# Patient Record
Sex: FEMALE | Race: BLACK OR AFRICAN AMERICAN | Employment: UNEMPLOYED | RURAL
[De-identification: names, ages, dates, MRNs, and addresses within clinical notes are randomized per-mention and may not be internally consistent; named-entity substitution may affect disease eponyms.]

---

## 2017-02-17 ENCOUNTER — TELEPHONE (OUTPATIENT)
Dept: FAMILY MEDICINE CLINIC | Age: 52
End: 2017-02-17

## 2017-02-17 DIAGNOSIS — Z12.31 ENCOUNTER FOR SCREENING MAMMOGRAM FOR MALIGNANT NEOPLASM OF BREAST: ICD-10-CM

## 2017-02-17 NOTE — TELEPHONE ENCOUNTER
An appointment has been made for you to have a mammogram at [facility] on May 15, 2017 at 1:00. The phone number in case you need to reschedule is [phone]. Letter to patient and order faxed.

## 2017-02-17 NOTE — LETTER
2/17/2017 2:00 PM 
 
Ms. Neli Braswell 43 [address] Dear Ms. Gonzalez: An appointment has been made for you to have a mammogram at [facility] on May 15, 2017 at 1:00. The phone number in case you need to reschedule is [phone]. If you have any questions, please contact our office. Sincerely, Victor Manuel Dye

## 2017-02-28 ENCOUNTER — OFFICE VISIT (OUTPATIENT)
Dept: FAMILY MEDICINE CLINIC | Age: 52
End: 2017-02-28

## 2017-02-28 VITALS
HEIGHT: 57 IN | TEMPERATURE: 96.3 F | RESPIRATION RATE: 20 BRPM | BODY MASS INDEX: 29.38 KG/M2 | SYSTOLIC BLOOD PRESSURE: 158 MMHG | DIASTOLIC BLOOD PRESSURE: 84 MMHG | WEIGHT: 136.2 LBS | HEART RATE: 64 BPM | OXYGEN SATURATION: 97 %

## 2017-02-28 DIAGNOSIS — I10 ESSENTIAL HYPERTENSION WITH GOAL BLOOD PRESSURE LESS THAN 140/90: Primary | ICD-10-CM

## 2017-02-28 RX ORDER — LISINOPRIL 20 MG/1
40 TABLET ORAL DAILY
Qty: 180 TAB | Refills: 1 | Status: SHIPPED | OUTPATIENT
Start: 2017-02-28 | End: 2017-12-23 | Stop reason: SDUPTHER

## 2017-02-28 RX ORDER — HYDROCHLOROTHIAZIDE 25 MG/1
25 TABLET ORAL DAILY
Qty: 90 TAB | Refills: 1 | Status: SHIPPED | OUTPATIENT
Start: 2017-02-28 | End: 2017-10-05 | Stop reason: SDUPTHER

## 2017-02-28 NOTE — PROGRESS NOTES
Progress Note    Patient: Armaan Gallardo MRN: [id number]  SSN: [id number]    YOB: 1965  Age: 46 y.o. Sex: female        Chief Complaint   Patient presents with    Hypertension     check         Subjective:     Encounter Diagnoses   Name Primary?  Essential hypertension with goal blood pressure less than 140/90 Yes       Hypertension: Uncontrolled   BP Readings from Last 3 Encounters:   02/28/17 158/84   12/23/16 141/82   11/18/16 146/87     The patient reports:  taking medications as instructed, no medication side effects noted, home BP monitoring in range of 145'J systolic over 90'U diastolic, no TIA's, no chest pain on exertion, no dyspnea on exertion, no swelling of ankles. Lab Results   Component Value Date/Time    Sodium 138 11/18/2016 09:42 AM    Potassium 4.2 11/18/2016 09:42 AM    Chloride 100 11/18/2016 09:42 AM    CO2 22 11/18/2016 09:42 AM    Anion gap 9 07/22/2009 03:42 PM    Glucose 79 11/18/2016 09:42 AM    BUN 12 11/18/2016 09:42 AM    Creatinine 0.78 11/18/2016 09:42 AM    BUN/Creatinine ratio 15 11/18/2016 09:42 AM    GFR est  11/18/2016 09:42 AM    GFR est non-AA 87 11/18/2016 09:42 AM    Calcium 9.6 11/18/2016 09:42 AM    Bilirubin, total 0.5 11/18/2015 08:55 AM    AST (SGOT) 16 11/18/2015 08:55 AM    Alk. phosphatase 64 11/18/2015 08:55 AM    Protein, total 7.2 11/18/2015 08:55 AM    Albumin 4.1 11/18/2015 08:55 AM    A-G Ratio 1.3 11/18/2015 08:55 AM    ALT (SGPT) 12 11/18/2015 08:55 AM     Our goal is to normalize the blood pressure to decrease the risks of strokes and heart attacks. The patient is in agreement with the plan. Current and past medical information:    Current Medications after this visit[de-identified]     Current Outpatient Prescriptions   Medication Sig    hydroCHLOROthiazide (HYDRODIURIL) 25 mg tablet Take 1 Tab by mouth daily.  lisinopril (PRINIVIL, ZESTRIL) 20 mg tablet Take 2 Tabs by mouth daily for 90 days.     diphenhydrAMINE (BENADRYL) 12.5 mg/5 mL Take 10 mL by mouth every six (6) hours as needed for Itching or Allergies. Indications: allergic reaction    atenolol (TENORMIN) 100 mg tablet Take 1 Tab by mouth nightly. This prescription is to take the place of all prior refills.  escitalopram oxalate (LEXAPRO) 5 mg tablet Take 1 Tab by mouth daily.  naproxen (NAPROSYN) 500 mg tablet Take 1 Tab by mouth two (2) times daily (with meals). No current facility-administered medications for this visit. Patient Active Problem List    Diagnosis Date Noted    Partial edentulism 12/23/2016    Hyperlipidemia 12/30/2011    GERD (gastroesophageal reflux disease) 12/30/2011    Vitamin D deficiency 01/31/2011    Depression 08/30/2010    Anxiety 08/30/2010    Hypertension        Past Medical History:   Diagnosis Date    Depression 8/30/2010    GERD (gastroesophageal reflux disease) 12/30/2011    Hyperlipidemia 12/30/2011    Hypertension        No Known Allergies    Past Surgical History:   Procedure Laterality Date    CARDIAC SURG PROCEDURE UNLIST  1974    closed hole in heart    HX HERNIA REPAIR  1983    HX TUBAL LIGATION  1995       Social History     Social History    Marital status: SINGLE     Spouse name: N/A    Number of children: N/A    Years of education: N/A     Social History Main Topics    Smoking status: Never Smoker    Smokeless tobacco: None    Alcohol use Yes      Comment: 2 beers every other day    Drug use: No    Sexual activity: Yes     Other Topics Concern    None     Social History Narrative       Review of Systems   Constitutional: Negative for chills and fever. Eyes: Negative for blurred vision and double vision. Respiratory: Negative for sputum production, shortness of breath and wheezing. Cardiovascular: Negative for chest pain, palpitations and leg swelling.         Objective:     Vitals:    02/28/17 1305 02/28/17 1310   BP: 168/81 158/84   Pulse: 65 64   Resp: 20    Temp: 96.3 °F (35.7 °C) TempSrc: Oral    SpO2: 97%    Weight: 136 lb 3.2 oz (61.8 kg)    Height: 4' 9\" (1.448 m)       Body mass index is 29.47 kg/(m^2). Physical Exam   Constitutional: She appears well-developed and well-nourished. Cardiovascular: Normal rate and regular rhythm. Pulmonary/Chest: Effort normal and breath sounds normal.   Musculoskeletal: Normal range of motion. She exhibits no edema. Health Maintenance Due   Topic Date Due    Hepatitis C Screening  1965    DTaP/Tdap/Td series (1 - Tdap) 06/16/1984    PAP AKA CERVICAL CYTOLOGY  01/21/2012    BREAST CANCER SCRN MAMMOGRAM  06/16/2013       Assessment and orders:     Encounter Diagnoses     ICD-10-CM ICD-9-CM   1. Essential hypertension with goal blood pressure less than 140/90 I10 401.9       1. Essential hypertension with goal blood pressure less than 140/90  BP not yet at goal. Will split lisinopril and hctz and increase lisinopril to 40 mg daily. Will have patient recheck BMP and RTC in 3 weeks to recheck BP.   - hydroCHLOROthiazide (HYDRODIURIL) 25 mg tablet; Take 1 Tab by mouth daily. Dispense: 90 Tab; Refill: 1  - lisinopril (PRINIVIL, ZESTRIL) 20 mg tablet; Take 2 Tabs by mouth daily for 90 days. Dispense: 180 Tab; Refill: 1  - METABOLIC PANEL, BASIC; Future        Plan of care:  Discussed diagnoses in detail with patient. Medication risks/benefits/side effects discussed with patient. All of the patient's questions were addressed. The patient understands and agrees with our plan of care. The patient knows to call back if they are unsure of or forget any changes we discussed today or if the symptoms change. The patient received an After-Visit Summary which contains VS, orders, medication list and allergy list. This can be used as a \"mini-medical record\" should they have to seek medical care while out of town. Follow-up Disposition:  Return in about 3 weeks (around 3/21/2017) for For BP check.  Please get blood work done 1 week prior to appointment. .    No future appointments.     Signed By: Jose Luis Odonnell MD     February 28, 2017

## 2017-02-28 NOTE — MR AVS SNAPSHOT
Visit Information Date & Time Provider Department Dept. Phone Encounter #  
 2/28/2017  1:00 PM Kasia Saenz MD [address] [place] [address] [phone] [id number] Follow-up Instructions Return in about 3 weeks (around 3/21/2017) for For BP check. Please get blood work done 1 week prior to appointment. Shen Kelly Upcoming Health Maintenance Date Due Hepatitis C Screening 1965 DTaP/Tdap/Td series (1 - Tdap) 6/16/1984 PAP AKA CERVICAL CYTOLOGY 1/21/2012 BREAST CANCER SCRN MAMMOGRAM 6/16/2013 FOBT Q 1 YEAR AGE 50-75 11/18/2017 Allergies as of 2/28/2017  Review Complete On: 2/28/2017 By: Kasia Saenz MD  
 No Known Allergies Current Immunizations  Reviewed on 8/30/2010 Name Date H1N1 FLU VACCINE 1/14/2010 Influenza Vaccine (Quad) PF 11/18/2016 Not reviewed this visit You Were Diagnosed With   
  
 Codes Comments Essential hypertension with goal blood pressure less than 140/90    -  Primary ICD-10-CM: I10 
ICD-9-CM: 401.9 Vitals BP  
  
  
  
  
  
 158/84 (BP 1 Location: Right arm, BP Patient Position: Sitting) Vitals History BMI and BSA Data Body Mass Index Body Surface Area  
 29.47 kg/m 2 1.58 m 2 Preferred Pharmacy Pharmacy Name Phone [facility] [address] [place] [address] [phone] Your Updated Medication List  
  
   
This list is accurate as of: 2/28/17  1:28 PM.  Always use your most recent med list.  
  
  
  
  
 atenolol 100 mg tablet Commonly known as:  TENORMIN Take 1 Tab by mouth nightly. This prescription is to take the place of all prior refills. diphenhydrAMINE 12.5 mg/5 mL Commonly known as:  BENADRYL Take 10 mL by mouth every six (6) hours as needed for Itching or Allergies. Indications: allergic reaction  
  
 escitalopram oxalate 5 mg tablet Commonly known as:  Domitila Basket Take 1 Tab by mouth daily. hydroCHLOROthiazide 25 mg tablet Commonly known as:  HYDRODIURIL Take 1 Tab by mouth daily. lisinopril 20 mg tablet Commonly known as:  Marla Aye Take 2 Tabs by mouth daily for 90 days. naproxen 500 mg tablet Commonly known as:  NAPROSYN Take 1 Tab by mouth two (2) times daily (with meals). Prescriptions Sent to Pharmacy Refills  
 hydroCHLOROthiazide (HYDRODIURIL) 25 mg tablet 1 Sig: Take 1 Tab by mouth daily. Class: Normal  
 Pharmacy: [address] [place] [address] #: [phone] Route: Oral  
 lisinopril (PRINIVIL, ZESTRIL) 20 mg tablet 1 Sig: Take 2 Tabs by mouth daily for 90 days. Class: Normal  
 Pharmacy: [id number] [place] [address] Ph #: [phone] Route: Oral  
  
Follow-up Instructions Return in about 3 weeks (around 3/21/2017) for For BP check. Please get blood work done 1 week prior to appointment. Joseph Murcia To-Do List   
 03/14/2017 Lab:  METABOLIC PANEL, BASIC Please provide this summary of care documentation to your next provider. Your primary care clinician is listed as Dino North. If you have any questions after today's visit, please call [phone].

## 2017-02-28 NOTE — PROGRESS NOTES
Reviewed record in preparation for visit and have necessary documentation  Pt did not bring medication to office visit for review  Information was given to pt on Advanced Directives, Living Will  opportunity was given for questions  Goals that were addressed and/or need to be completed during or after this appointment include   Health Maintenance Due   Topic Date Due    Hepatitis C Screening  1965    DTaP/Tdap/Td series (1 - Tdap) 06/16/1984    PAP AKA CERVICAL CYTOLOGY  01/21/2012    BREAST CANCER SCRN MAMMOGRAM  06/16/2013

## 2017-03-14 DIAGNOSIS — I10 ESSENTIAL HYPERTENSION WITH GOAL BLOOD PRESSURE LESS THAN 140/90: ICD-10-CM

## 2017-03-23 ENCOUNTER — OFFICE VISIT (OUTPATIENT)
Dept: FAMILY MEDICINE CLINIC | Age: 52
End: 2017-03-23

## 2017-03-23 VITALS
OXYGEN SATURATION: 97 % | BODY MASS INDEX: 29.34 KG/M2 | RESPIRATION RATE: 18 BRPM | SYSTOLIC BLOOD PRESSURE: 155 MMHG | TEMPERATURE: 95.9 F | WEIGHT: 136 LBS | DIASTOLIC BLOOD PRESSURE: 88 MMHG | HEIGHT: 57 IN | HEART RATE: 59 BPM

## 2017-03-23 DIAGNOSIS — F33.0 MILD EPISODE OF RECURRENT MAJOR DEPRESSIVE DISORDER (HCC): ICD-10-CM

## 2017-03-23 DIAGNOSIS — I10 ESSENTIAL HYPERTENSION: Primary | ICD-10-CM

## 2017-03-23 RX ORDER — CHOLECALCIFEROL (VITAMIN D3) 125 MCG
CAPSULE ORAL
COMMUNITY
End: 2020-02-27 | Stop reason: SDUPTHER

## 2017-03-23 RX ORDER — CITALOPRAM 10 MG/1
10 TABLET ORAL DAILY
Qty: 30 TAB | Refills: 4 | Status: SHIPPED | OUTPATIENT
Start: 2017-03-23 | End: 2018-04-24 | Stop reason: SDUPTHER

## 2017-03-23 NOTE — PATIENT INSTRUCTIONS
Herrera Frazier with [address]  [address] [place] [address],  [address] [place], [address] [place] [address]  [phone]    Monitor blood pressure outside the office several times weekly at different times during the day and evening. Bring the record to me in 3 weeks for review.     Blood Pressure Record     Patient Name:  Willy Sewell :  1965    Date/Time BP Reading Pulse

## 2017-03-23 NOTE — PROGRESS NOTES
I reviewed the findings, assessment and plan in detail with the resident and agree with the resident's findings and plan as documented in the resident's note. Elly Goyal M.D.

## 2017-03-23 NOTE — PROGRESS NOTES
Reviewed record in preparation for visit and have necessary documentation  Pt did not bring medication to office visit for review  opportunity was given for questions  Goals that were addressed and/or need to be completed during or after this appointment include   Health Maintenance Due   Topic Date Due    Hepatitis C Screening  1965    DTaP/Tdap/Td series (1 - Tdap) 06/16/1984    PAP AKA CERVICAL CYTOLOGY  01/21/2012    BREAST CANCER SCRN MAMMOGRAM  06/16/2013

## 2017-03-23 NOTE — PROGRESS NOTES
Progress Note    Patient: Diamond Levy MRN: [id number]  SSN: [id number]    YOB: 1965  Age: 46 y.o. Sex: female        Chief Complaint   Patient presents with    Hypertension         Subjective:     Encounter Diagnoses   Name Primary?  Essential hypertension Yes    Mild episode of recurrent major depressive disorder ([facility] [address]        Hypertension: Uncontrolled    BP Readings from Last 3 Encounters:   03/23/17 155/88   02/28/17 158/84   12/23/16 141/82     The patient reports:  taking medications as instructed (Patient did not take medication this morning bc she did not eat), no medication side effects noted, no TIA's, no chest pain on exertion, no dyspnea on exertion, no swelling of ankles. Lab Results   Component Value Date/Time    Sodium 138 11/18/2016 09:42 AM    Potassium 4.2 11/18/2016 09:42 AM    Chloride 100 11/18/2016 09:42 AM    CO2 22 11/18/2016 09:42 AM    Anion gap 9 07/22/2009 03:42 PM    Glucose 79 11/18/2016 09:42 AM    BUN 12 11/18/2016 09:42 AM    Creatinine 0.78 11/18/2016 09:42 AM    BUN/Creatinine ratio 15 11/18/2016 09:42 AM    GFR est  11/18/2016 09:42 AM    GFR est non-AA 87 11/18/2016 09:42 AM    Calcium 9.6 11/18/2016 09:42 AM    Bilirubin, total 0.5 11/18/2015 08:55 AM    AST (SGOT) 16 11/18/2015 08:55 AM    Alk. phosphatase 64 11/18/2015 08:55 AM    Protein, total 7.2 11/18/2015 08:55 AM    Albumin 4.1 11/18/2015 08:55 AM    A-G Ratio 1.3 11/18/2015 08:55 AM    ALT (SGPT) 12 11/18/2015 08:55 AM     Our goal is to normalize the blood pressure to decrease the risks of strokes and heart attacks. The patient is in agreement with the plan. Depression: Controlled on current medications. The patient has no suicidal ideation, psychotic features or addictions.       Current and past medical information:    Current Medications after this visit[de-identified]     Current Outpatient Prescriptions   Medication Sig    cholecalciferol, vitamin D3, (VITAMIN D3) 2,000 unit tab Take  by mouth.    citalopram (CELEXA) 10 mg tablet Take 1 Tab by mouth daily.  hydroCHLOROthiazide (HYDRODIURIL) 25 mg tablet Take 1 Tab by mouth daily.  lisinopril (PRINIVIL, ZESTRIL) 20 mg tablet Take 2 Tabs by mouth daily for 90 days.  naproxen (NAPROSYN) 500 mg tablet Take 1 Tab by mouth two (2) times daily (with meals).  diphenhydrAMINE (BENADRYL) 12.5 mg/5 mL Take 10 mL by mouth every six (6) hours as needed for Itching or Allergies. Indications: allergic reaction    atenolol (TENORMIN) 100 mg tablet Take 1 Tab by mouth nightly. This prescription is to take the place of all prior refills. No current facility-administered medications for this visit. Patient Active Problem List    Diagnosis Date Noted    Partial edentulism 12/23/2016    Hyperlipidemia 12/30/2011    GERD (gastroesophageal reflux disease) 12/30/2011    Vitamin D deficiency 01/31/2011    Depression 08/30/2010    Anxiety 08/30/2010    Hypertension        Past Medical History:   Diagnosis Date    Depression 8/30/2010    GERD (gastroesophageal reflux disease) 12/30/2011    Hyperlipidemia 12/30/2011    Hypertension        No Known Allergies    Past Surgical History:   Procedure Laterality Date    CARDIAC SURG PROCEDURE UNLIST  1974    closed hole in heart    HX HERNIA REPAIR  1983    HX TUBAL LIGATION  1995       Social History     Social History    Marital status: SINGLE     Spouse name: N/A    Number of children: N/A    Years of education: N/A     Social History Main Topics    Smoking status: Never Smoker    Smokeless tobacco: None    Alcohol use Yes      Comment: 2 beers every other day    Drug use: No    Sexual activity: Yes     Other Topics Concern    None     Social History Narrative       {Review of Systems   Cardiovascular: Negative for chest pain, palpitations and leg swelling. Skin: Negative for itching and rash. Neurological: Negative for dizziness, seizures and loss of consciousness. Psychiatric/Behavioral: Positive for depression. Negative for hallucinations, substance abuse and suicidal ideas. The patient is not nervous/anxious and does not have insomnia. Objective:     Vitals:    03/23/17 1026   BP: 155/88   Pulse: (!) 59   Resp: 18   Temp: 95.9 °F (35.5 °C)   TempSrc: Oral   SpO2: 97%   Weight: 136 lb (61.7 kg)   Height: 4' 9\" (1.448 m)      Body mass index is 29.43 kg/(m^2). Physical Exam   Constitutional: She is oriented to person, place, and time. She appears well-developed and well-nourished. HENT:   Head: Normocephalic and atraumatic. Cardiovascular: Normal rate and regular rhythm. Pulmonary/Chest: Effort normal and breath sounds normal.   Musculoskeletal: Normal range of motion. She exhibits no edema. Neurological: She is alert and oriented to person, place, and time. Health Maintenance Due   Topic Date Due    Hepatitis C Screening  1965    DTaP/Tdap/Td series (1 - Tdap) 06/16/1984    PAP AKA CERVICAL CYTOLOGY  01/21/2012    BREAST CANCER SCRN MAMMOGRAM  06/16/2013       Assessment and orders:     Encounter Diagnoses     ICD-10-CM ICD-9-CM   1. Essential hypertension I10 401.9   2. Mild episode of recurrent major depressive disorder (HCC) F33.0 296.31       1. Essential hypertension  Advised patient to take medication. Will get blood work today. Advised to keep BP log and bring all medication and BP machine to office. 2. Mild episode of recurrent major depressive disorder [facility])  Patient requested change due to cost of medication. Had been on low dose lexapro 5 mg.   - citalopram (CELEXA) 10 mg tablet; Take 1 Tab by mouth daily. Dispense: 30 Tab; Refill: 4        Plan of care:  Discussed diagnoses in detail with patient. Medication risks/benefits/side effects discussed with patient. All of the patient's questions were addressed. The patient understands and agrees with our plan of care.     The patient knows to call back if they are unsure of or forget any changes we discussed today or if the symptoms change. The patient received an After-Visit Summary which contains VS, orders, medication list and allergy list. This can be used as a \"mini-medical record\" should they have to seek medical care while out of town. Follow-up Disposition:  Return in about 4 weeks (around 4/20/2017). No future appointments.     Signed By: Evan May MD     March 23, 2017

## 2017-03-23 NOTE — MR AVS SNAPSHOT
Visit Information Date & Time Provider Department Dept. Phone Encounter #  
 3/23/2017 10:30 AM Ciara Beltran MD [address] [place] [address] [phone] [id number] Follow-up Instructions Return in about 4 weeks (around 4/20/2017) for Keep BP log and bring all medication and BP machine to office. .  
 Follow-up and Disposition History Upcoming Health Maintenance Date Due Hepatitis C Screening 1965 DTaP/Tdap/Td series (1 - Tdap) 6/16/1984 PAP AKA CERVICAL CYTOLOGY 1/21/2012 BREAST CANCER SCRN MAMMOGRAM 6/16/2013 FOBT Q 1 YEAR AGE 50-75 11/18/2017 Allergies as of 3/23/2017  Review Complete On: 3/23/2017 By: Ciara Beltran MD  
 No Known Allergies Current Immunizations  Reviewed on 8/30/2010 Name Date H1N1 FLU VACCINE 1/14/2010 Influenza Vaccine (Quad) PF 11/18/2016 Not reviewed this visit You Were Diagnosed With   
  
 Codes Comments Essential hypertension    -  Primary ICD-10-CM: I10 
ICD-9-CM: 401.9 Mild episode of recurrent major depressive disorder (HCC)     ICD-10-CM: F33.0 ICD-9-CM: 296.31 Vitals BP Pulse Temp Resp Height(growth percentile) Weight(growth percentile) 155/88 (BP 1 Location: Left arm, BP Patient Position: Sitting) (!) 59 95.9 °F (35.5 °C) (Oral) 18 4' 9\" (1.448 m) 136 lb (61.7 kg) LMP SpO2 BMI OB Status Smoking Status 01/02/2017 (Within Months) 97% 29.43 kg/m2 Having regular periods Never Smoker Vitals History BMI and BSA Data Body Mass Index Body Surface Area  
 29.43 kg/m 2 1.58 m 2 Preferred Pharmacy Pharmacy Name Phone [facility] [address] [place] [address] [phone] Your Updated Medication List  
  
   
This list is accurate as of: 3/23/17 11:05 AM.  Always use your most recent med list.  
  
  
  
  
 atenolol 100 mg tablet Commonly known as:  TENORMIN  
 Take 1 Tab by mouth nightly. This prescription is to take the place of all prior refills. citalopram 10 mg tablet Commonly known as:  Algernon Kotyk Take 1 Tab by mouth daily. diphenhydrAMINE 12.5 mg/5 mL Commonly known as:  BENADRYL Take 10 mL by mouth every six (6) hours as needed for Itching or Allergies. Indications: allergic reaction  
  
 hydroCHLOROthiazide 25 mg tablet Commonly known as:  HYDRODIURIL Take 1 Tab by mouth daily. lisinopril 20 mg tablet Commonly known as:  Isidro Len Take 2 Tabs by mouth daily for 90 days. naproxen 500 mg tablet Commonly known as:  NAPROSYN Take 1 Tab by mouth two (2) times daily (with meals). VITAMIN D3 2,000 unit Tab Generic drug:  cholecalciferol (vitamin D3) Take  by mouth. Prescriptions Sent to Pharmacy Refills  
 citalopram (CELEXA) 10 mg tablet 4 Sig: Take 1 Tab by mouth daily. Class: Normal  
 Pharmacy: [address] #: [phone] Route: Oral  
  
Follow-up Instructions Return in about 4 weeks (around 2017) for Keep BP log and bring all medication and BP machine to office. .  
  
  
Patient Instructions Megan William [facility] with [address] [place] [address] 
[phone] Monitor blood pressure outside the office several times weekly at different times during the day and evening. Bring the record to me in 3 weeks for review. Blood Pressure Record Patient Name:  Diamond Levy :  1965 Date/Time BP Reading Pulse Patient Instructions History Please provide this summary of care documentation to your next provider. Your primary care clinician is listed as Orlando Wood. If you have any questions after today's visit, please call [phone].

## 2017-03-24 LAB
BUN SERPL-MCNC: 13 MG/DL (ref 6–24)
BUN/CREAT SERPL: 16 (ref 9–23)
CALCIUM SERPL-MCNC: 9.7 MG/DL (ref 8.7–10.2)
CHLORIDE SERPL-SCNC: 95 MMOL/L (ref 96–106)
CO2 SERPL-SCNC: 23 MMOL/L (ref 18–29)
CREAT SERPL-MCNC: 0.8 MG/DL (ref 0.57–1)
GLUCOSE SERPL-MCNC: 89 MG/DL (ref 65–99)
POTASSIUM SERPL-SCNC: 3.9 MMOL/L (ref 3.5–5.2)
SODIUM SERPL-SCNC: 135 MMOL/L (ref 134–144)

## 2017-04-27 ENCOUNTER — OFFICE VISIT (OUTPATIENT)
Dept: FAMILY MEDICINE CLINIC | Age: 52
End: 2017-04-27

## 2017-04-27 VITALS
DIASTOLIC BLOOD PRESSURE: 65 MMHG | WEIGHT: 138 LBS | OXYGEN SATURATION: 98 % | HEIGHT: 57 IN | HEART RATE: 68 BPM | RESPIRATION RATE: 16 BRPM | TEMPERATURE: 98 F | SYSTOLIC BLOOD PRESSURE: 117 MMHG | BODY MASS INDEX: 29.77 KG/M2

## 2017-04-27 DIAGNOSIS — R68.89 WORSENING FUNCTIONAL ENDURANCE: ICD-10-CM

## 2017-04-27 DIAGNOSIS — I10 ESSENTIAL HYPERTENSION: Primary | ICD-10-CM

## 2017-04-27 DIAGNOSIS — F33.0 MILD EPISODE OF RECURRENT MAJOR DEPRESSIVE DISORDER (HCC): ICD-10-CM

## 2017-04-27 NOTE — PROGRESS NOTES
I discussed the findings, assessment and plan in detail with the resident and agree with the resident's findings and plan as documented in the resident's note. Kimberly Almonte M.D.

## 2017-04-27 NOTE — MR AVS SNAPSHOT
Visit Information Date & Time Provider Department Dept. Phone Encounter #  
 4/27/2017 10:40 AM Radha Jimenes MD [facility]ru[facility] Marlow [id number] Follow-up Instructions Return if symptoms worsen or fail to improve, for [facility]. Upcoming Health Maintenance Date Due Hepatitis C Screening 1965 DTaP/Tdap/Td series (1 - Tdap) 6/16/1984 PAP AKA CERVICAL CYTOLOGY 1/21/2012 BREAST CANCER SCRN MAMMOGRAM 6/16/2013 FOBT Q 1 YEAR AGE 50-75 11/18/2017 Allergies as of 4/27/2017  Review Complete On: 4/27/2017 By: Radha Jimenes MD  
 No Known Allergies Current Immunizations  Reviewed on 8/30/2010 Name Date H1N1 FLU VACCINE 1/14/2010 Influenza Vaccine (Quad) PF 11/18/2016 Not reviewed this visit You Were Diagnosed With   
  
 Codes Comments Essential hypertension    -  Primary ICD-10-CM: I10 
ICD-9-CM: 401.9 Mild episode of recurrent major depressive disorder (HCC)     ICD-10-CM: F33.0 ICD-9-CM: 296.31 Worsening functional endurance     ICD-10-CM: R68.89 ICD-9-CM: 780.99 Vitals BP Pulse Temp Resp Height(growth percentile) Weight(growth percentile)  
 117/65 (BP 1 Location: Right arm, BP Patient Position: Sitting) 68 98 °F (36.7 °C) (Oral) 16 4' 9\" (1.448 m) 138 lb (62.6 kg) LMP SpO2 BMI OB Status Smoking Status 04/12/2017 98% 29.86 kg/m2 Premenopausal Never Smoker Vitals History BMI and BSA Data Body Mass Index Body Surface Area  
 29.86 kg/m 2 1.59 m 2 Preferred Pharmacy Pharmacy Name Phone [facility] [address] [facility] [address] [phone] Your Updated Medication List  
  
   
This list is accurate as of: 4/27/17 11:14 AM.  Always use your most recent med list.  
  
  
  
  
 atenolol 100 mg tablet Commonly known as:  TENORMIN Take 1 Tab by mouth nightly.  This prescription is to take the place of all prior refills. citalopram 10 mg tablet Commonly known as:  Berneta Face Take 1 Tab by mouth daily. diphenhydrAMINE 12.5 mg/5 mL Commonly known as:  BENADRYL Take 10 mL by mouth every six (6) hours as needed for Itching or Allergies. Indications: allergic reaction  
  
 hydroCHLOROthiazide 25 mg tablet Commonly known as:  HYDRODIURIL Take 1 Tab by mouth daily. lisinopril 20 mg tablet Commonly known as:  Shirlie Holes Take 2 Tabs by mouth daily for 90 days. naproxen 500 mg tablet Commonly known as:  NAPROSYN Take 1 Tab by mouth two (2) times daily (with meals). VITAMIN D3 2,000 unit Tab Generic drug:  cholecalciferol (vitamin D3) Take  by mouth. Follow-up Instructions Return if symptoms worsen or fail to improve, for Well Woman. Please provide this summary of care documentation to your next provider. Your primary care clinician is listed as Clenton Saint. If you have any questions after today's visit, please call [phone].

## 2017-04-27 NOTE — PROGRESS NOTES
Progress Note    Patient: Rodríguez Mendoza MRN: [id number]  SSN: [id number]    YOB: 1965  Age: 46 y.o. Sex: female        Chief Complaint   Patient presents with    Hypertension    Extremity Weakness     bilateral leg weakness          Subjective:     Encounter Diagnoses   Name Primary?  Essential hypertension Yes    Mild episode of recurrent major depressive disorder ([facility] [address]     Worsening functional endurance        Hypertension: Controlled   BP Readings from Last 3 Encounters:   04/27/17 117/65   03/23/17 155/88   02/28/17 158/84     The patient reports:  taking medications as instructed, no medication side effects noted, no TIA's, no chest pain on exertion, no dyspnea on exertion, no swelling of ankles, patient brought log and BP machine to office for check. Home BP log. -120s   Lab Results   Component Value Date/Time    Sodium 135 03/23/2017 11:17 AM    Potassium 3.9 03/23/2017 11:17 AM    Chloride 95 03/23/2017 11:17 AM    CO2 23 03/23/2017 11:17 AM    Anion gap 9 07/22/2009 03:42 PM    Glucose 89 03/23/2017 11:17 AM    BUN 13 03/23/2017 11:17 AM    Creatinine 0.80 03/23/2017 11:17 AM    BUN/Creatinine ratio 16 03/23/2017 11:17 AM    GFR est AA 97 03/23/2017 11:17 AM    GFR est non-AA 84 03/23/2017 11:17 AM    Calcium 9.7 03/23/2017 11:17 AM    Bilirubin, total 0.5 11/18/2015 08:55 AM    AST (SGOT) 16 11/18/2015 08:55 AM    Alk. phosphatase 64 11/18/2015 08:55 AM    Protein, total 7.2 11/18/2015 08:55 AM    Albumin 4.1 11/18/2015 08:55 AM    A-G Ratio 1.3 11/18/2015 08:55 AM    ALT (SGPT) 12 11/18/2015 08:55 AM     Our goal is to normalize the blood pressure to decrease the risks of strokes and heart attacks. The patient is in agreement with the plan. Depression: Stable on current medications. Patient has switched to Celexa due to cost of medication. The patient has no suicidal ideation, psychotic features or addictions.     LE weakness  Patient reports LE weakness that after walking for several blocks that she feels her legs are heavy. No pain, change in sensation. Patient reports that she had been unable to do her regular walks over the winter due to the cold weather. Current and past medical information:    Current Medications after this visit[de-identified]     Current Outpatient Prescriptions   Medication Sig    cholecalciferol, vitamin D3, (VITAMIN D3) 2,000 unit tab Take  by mouth.  citalopram (CELEXA) 10 mg tablet Take 1 Tab by mouth daily.  hydroCHLOROthiazide (HYDRODIURIL) 25 mg tablet Take 1 Tab by mouth daily.  lisinopril (PRINIVIL, ZESTRIL) 20 mg tablet Take 2 Tabs by mouth daily for 90 days.  naproxen (NAPROSYN) 500 mg tablet Take 1 Tab by mouth two (2) times daily (with meals).  diphenhydrAMINE (BENADRYL) 12.5 mg/5 mL Take 10 mL by mouth every six (6) hours as needed for Itching or Allergies. Indications: allergic reaction    atenolol (TENORMIN) 100 mg tablet Take 1 Tab by mouth nightly. This prescription is to take the place of all prior refills. No current facility-administered medications for this visit.         Patient Active Problem List    Diagnosis Date Noted    Partial edentulism 12/23/2016    Hyperlipidemia 12/30/2011    GERD (gastroesophageal reflux disease) 12/30/2011    Vitamin D deficiency 01/31/2011    Depression 08/30/2010    Anxiety 08/30/2010    Hypertension        Past Medical History:   Diagnosis Date    Depression 8/30/2010    GERD (gastroesophageal reflux disease) 12/30/2011    Hyperlipidemia 12/30/2011    Hypertension        No Known Allergies    Past Surgical History:   Procedure Laterality Date    CARDIAC SURG PROCEDURE UNLIST  1974    closed hole in heart    HX HERNIA REPAIR  1983    HX TUBAL LIGATION  1995       Social History     Social History    Marital status: SINGLE     Spouse name: N/A    Number of children: N/A    Years of education: N/A     Social History Main Topics    Smoking status: Never Smoker    Smokeless tobacco: None    Alcohol use Yes      Comment: 2 beers every other day    Drug use: No    Sexual activity: Yes     Other Topics Concern    None     Social History Narrative       {Review of Systems   Constitutional: Negative for chills and fever. Cardiovascular: Negative for chest pain, palpitations and leg swelling. Neurological: Negative for dizziness, tingling, sensory change and focal weakness. Objective:     Vitals:    04/27/17 1039   BP: 117/65   Pulse: 68   Resp: 16   Temp: 98 °F (36.7 °C)   TempSrc: Oral   SpO2: 98%   Weight: 138 lb (62.6 kg)   Height: 4' 9\" (1.448 m)      Body mass index is 29.86 kg/(m^2). Physical Exam   Constitutional: She is oriented to person, place, and time. She appears well-developed and well-nourished. Cardiovascular: Normal rate, regular rhythm and intact distal pulses. Musculoskeletal:        Right ankle: She exhibits normal pulse. Left ankle: She exhibits normal pulse. Neurological: She is alert and oriented to person, place, and time. She has normal reflexes. Skin: Skin is warm and dry.     MSK:    Posture: Normal   Deformity: None    ROM:     Flexion: Normal    Extension: Normal     Lateral bending: Normal      Gait: Normal       Palpation:    L1-L5: No tenderness    Sacrum: No tenderness    Coccyx: No tenderness    Left Paraspinal: No tenderness    Right Paraspinal: No tenderness     Strength (0-5/5)    Hip Flexion:   Left: 5/5  Right: 5/5    Hip Extension:  Left: 5/5  Right: 5/5    Hip Abduction:  Left: 5/5  Right: 5/5    Hip Adduction:  Left: 5/5  Right: 5/5    Knee Extension:  Left: 5/5  Right: 5/5    Knee Flexion:   Left: 5/5  Right: 5/5    Ankle dorsiflexion:  Left: 5/5  Right: 5/5    Ankle plantarflexion:  Left: 5/5  Right: 5/5    Great toe extension:  Left: 5/5  Right: 5/5     Sensation: Intact, no deficits      DTR:    Patella:  Left: +2  Right: +2    Achilles:  Left: +2  Right: +2     Special test:    Straight leg: Left: Negative  Right: Negative    Roulas: Left: Negative  Right: Negative              Health Maintenance Due   Topic Date Due    Hepatitis C Screening  1965    DTaP/Tdap/Td series (1 - Tdap) 06/16/1984    PAP AKA CERVICAL CYTOLOGY  01/21/2012    BREAST CANCER SCRN MAMMOGRAM  06/16/2013       Assessment and orders:     Encounter Diagnoses     ICD-10-CM ICD-9-CM   1. Essential hypertension I10 401.9   2. Mild episode of recurrent major depressive disorder (HCC) F33.0 296.31   3. Worsening functional endurance R68.89 780.99       1. Essential hypertension  Continue current medication. Home BP at goal. No hypotensive symptoms. 2. Mild episode of recurrent major depressive disorder ([address]  Continue current medication. 3. Worsening functional endurance  Advised patient to slowly scale up activities. Plan of care:  Discussed diagnoses in detail with patient. Medication risks/benefits/side effects discussed with patient. All of the patient's questions were addressed. The patient understands and agrees with our plan of care. The patient knows to call back if they are unsure of or forget any changes we discussed today or if the symptoms change. The patient received an After-Visit Summary which contains VS, orders, medication list and allergy list. This can be used as a \"mini-medical record\" should they have to seek medical care while out of town. Follow-up Disposition:  Return if symptoms worsen or fail to improve, for [facility]. No future appointments.     Signed By: Angi Zhang MD     April 27, 2017

## 2017-08-21 ENCOUNTER — TELEPHONE (OUTPATIENT)
Dept: FAMILY MEDICINE CLINIC | Age: 52
End: 2017-08-21

## 2017-08-21 DIAGNOSIS — I10 ESSENTIAL HYPERTENSION: Primary | ICD-10-CM

## 2017-08-21 NOTE — TELEPHONE ENCOUNTER
Received notification that Atenolol is unavailable. Please advise an alternative medication at [facility] - [phone].

## 2017-08-22 RX ORDER — METOPROLOL TARTRATE 50 MG/1
50 TABLET ORAL 2 TIMES DAILY
Qty: 60 TAB | Refills: 1 | Status: SHIPPED | OUTPATIENT
Start: 2017-08-22 | End: 2017-10-05 | Stop reason: SDUPTHER

## 2017-10-05 ENCOUNTER — OFFICE VISIT (OUTPATIENT)
Dept: FAMILY MEDICINE CLINIC | Age: 52
End: 2017-10-05

## 2017-10-05 VITALS
SYSTOLIC BLOOD PRESSURE: 119 MMHG | HEART RATE: 87 BPM | OXYGEN SATURATION: 95 % | RESPIRATION RATE: 18 BRPM | HEIGHT: 57 IN | BODY MASS INDEX: 29.68 KG/M2 | DIASTOLIC BLOOD PRESSURE: 78 MMHG | WEIGHT: 137.6 LBS | TEMPERATURE: 98.5 F

## 2017-10-05 DIAGNOSIS — Z23 ENCOUNTER FOR IMMUNIZATION: ICD-10-CM

## 2017-10-05 DIAGNOSIS — E78.2 MIXED HYPERLIPIDEMIA: ICD-10-CM

## 2017-10-05 DIAGNOSIS — I10 ESSENTIAL HYPERTENSION WITH GOAL BLOOD PRESSURE LESS THAN 140/90: Primary | ICD-10-CM

## 2017-10-05 DIAGNOSIS — F33.42 RECURRENT MAJOR DEPRESSIVE DISORDER, IN FULL REMISSION (HCC): ICD-10-CM

## 2017-10-05 DIAGNOSIS — Z11.59 NEED FOR HEPATITIS C SCREENING TEST: ICD-10-CM

## 2017-10-05 RX ORDER — HYDROCHLOROTHIAZIDE 25 MG/1
25 TABLET ORAL DAILY
Qty: 90 TAB | Refills: 1 | Status: SHIPPED | OUTPATIENT
Start: 2017-10-05 | End: 2018-06-18 | Stop reason: SDUPTHER

## 2017-10-05 RX ORDER — METOPROLOL TARTRATE 50 MG/1
50 TABLET ORAL 2 TIMES DAILY
Qty: 180 TAB | Refills: 1 | Status: SHIPPED | OUTPATIENT
Start: 2017-10-05 | End: 2018-08-11 | Stop reason: SDUPTHER

## 2017-10-05 NOTE — PROGRESS NOTES
Chief Complaint   Patient presents with    Blood Pressure Check    Immunization/Injection     Flu Shot     Body mass index is 29.78 kg/(m^2).     Reviewed record in preparation for visit and have necessary documentation  Pt did not bring medication to office visit for review  Information was given to pt on Advanced Directives, Living Will  Information was given on Shingles Vaccine  Opportunity was given for questions  Goals that were addressed and/or need to be completed after this appointment include:     Health Maintenance Due   Topic Date Due    Hepatitis C Screening  1965    DTaP/Tdap/Td series (1 - Tdap) 06/16/1984    PAP AKA CERVICAL CYTOLOGY  01/21/2012    BREAST CANCER SCRN MAMMOGRAM  06/16/2013    INFLUENZA AGE 9 TO ADULT  08/01/2017    FOBT Q 1 YEAR AGE 50-75  11/18/2017

## 2017-10-05 NOTE — PATIENT INSTRUCTIONS
Influenza (Flu) Vaccine (Inactivated or Recombinant): What You Need to Know  Why get vaccinated? Influenza (\"flu\") is a contagious disease that spreads around the [place] every winter, usually between October and May. Flu is caused by influenza viruses and is spread mainly by coughing, sneezing, and close contact. Anyone can get flu. Flu strikes suddenly and can last several days. Symptoms vary by age, but can include:  · Fever/chills. · Sore throat. · Muscle aches. · Fatigue. · Cough. · Headache. · Runny or stuffy nose. Flu can also lead to pneumonia and blood infections, and cause diarrhea and seizures in children. If you have a medical condition, such as heart or lung disease, flu can make it worse. Flu is more dangerous for some people. Infants and young children, people 72years of age and older, pregnant women, and people with certain health conditions or a weakened immune system are at greatest risk. Each year thousands of people in the [facility] die from flu, and many more are hospitalized. Flu vaccine can:  · Keep you from getting flu. · Make flu less severe if you do get it. · Keep you from spreading flu to your family and other people. Inactivated and recombinant flu vaccines  A dose of flu vaccine is recommended every flu season. Children 6 months through 6years of age may need two doses during the same flu season. Everyone else needs only one dose each flu season. Some inactivated flu vaccines contain a very small amount of a mercury-based preservative called thimerosal. Studies have not shown thimerosal in vaccines to be harmful, but flu vaccines that do not contain thimerosal are available. There is no live flu virus in flu shots. They cannot cause the flu. There are many flu viruses, and they are always changing. Each year a new flu vaccine is made to protect against three or four viruses that are likely to cause disease in the upcoming flu season.  But even when the vaccine doesn't exactly match these viruses, it may still provide some protection. Flu vaccine cannot prevent:  · Flu that is caused by a virus not covered by the vaccine. · Illnesses that look like flu but are not. Some people should not get this vaccine  Tell the person who is giving you the vaccine:  · If you have any severe (life-threatening) allergies. If you ever had a life-threatening allergic reaction after a dose of flu vaccine, or have a severe allergy to any part of this vaccine, you may be advised not to get vaccinated. Most, but not all, types of flu vaccine contain a small amount of egg protein. · If you ever had Guillain-Barré syndrome (also called GBS) Some people with a history of GBS should not get this vaccine. This should be discussed with your doctor. · If you are not feeling well. It is usually okay to get flu vaccine when you have a mild illness, but you might be asked to come back when you feel better. Risks of a vaccine reaction  With any medicine, including vaccines, there is a chance of reactions. These are usually mild and go away on their own, but serious reactions are also possible. Most people who get a flu shot do not have any problems with it. Minor problems following a flu shot include:  · Soreness, redness, or swelling where the shot was given  · Hoarseness  · Sore, red or itchy eyes  · Cough  · Fever  · Aches  · Headache  · Itching  · Fatigue  If these problems occur, they usually begin soon after the shot and last 1 or 2 days. More serious problems following a flu shot can include the following:  · There may be a small increased risk of Guillain-Barré Syndrome (GBS) after inactivated flu vaccine. This risk has been estimated at 1 or 2 additional cases per million people vaccinated. This is much lower than the risk of severe complications from flu, which can be prevented by flu vaccine.   · Mackenzie Augie children who get the flu shot along with pneumococcal vaccine (PCV13) and/or DTaP vaccine at the same time might be slightly more likely to have a seizure caused by fever. Ask your doctor for more information. Tell your doctor if a child who is getting flu vaccine has ever had a seizure  Problems that could happen after any injected vaccine:  · People sometimes faint after a medical procedure, including vaccination. Sitting or lying down for about 15 minutes can help prevent fainting, and injuries caused by a fall. Tell your doctor if you feel dizzy, or have vision changes or ringing in the ears. · Some people get severe pain in the shoulder and have difficulty moving the arm where a shot was given. This happens very rarely. · Any medication can cause a severe allergic reaction. Such reactions from a vaccine are very rare, estimated at about 1 in a million doses, and would happen within a few minutes to a few hours after the vaccination. As with any medicine, there is a very remote chance of a vaccine causing a serious injury or death. The safety of vaccines is always being monitored. For more information, visit: www.cdc.gov/vaccinesafety/. What if there is a serious reaction? What should I look for? · Look for anything that concerns you, such as signs of a severe allergic reaction, very high fever, or unusual behavior. Signs of a severe allergic reaction can include hives, swelling of the face and throat, difficulty breathing, a fast heartbeat, dizziness, and weakness - usually within a few minutes to a few hours after the vaccination. What should I do? · If you think it is a severe allergic reaction or other emergency that can't wait, call 9-1-1 and get the person to the nearest hospital. Otherwise, call your doctor. · Reactions should be reported to the \"Vaccine Adverse Event Reporting System\" (VAERS). Your doctor should file this report, or you can do it yourself through the VAERS website at www.vaers. [facility].gov, or by calling 9-524.300.8813.   [employer] does not give medical advice. The National Vaccine Injury Compensation Program  The National Vaccine Injury Compensation Program (VICP) is a federal program that was created to compensate people who may have been injured by certain vaccines. Persons who believe they may have been injured by a vaccine can learn about the program and about filing a claim by calling 9-357.768.9687 or visiting the [employer] website at www.[facility].gov/vaccinecompensation. There is a time limit to file a claim for compensation. How can I learn more? · Ask your healthcare provider. He or she can give you the vaccine package insert or suggest other sources of information. · Call your local or state health department. · Contact the Centers for Disease Control and Prevention (CDC):  ¨ Call 8-992.676.2932 (1-800-CDC-INFO) or  ¨ Visit CDC's website at www.cdc.gov/flu  Vaccine Information Statement  Inactivated Influenza Vaccine  8/7/2015)  [address]  [address]95  Department of Health and Human Services  Centers for Disease Control and Prevention  Many Vaccine Information Statements are available in [place] and other languages. See www.immunize.org/vis. Muchas hojas de información sobre vacunas están disponibles en español y en otros idiomas. Visite www.immunize.org/vis. Care instructions adapted under license by [employer] (which disclaims liability or warranty for this information). If you have questions about a medical condition or this instruction, always ask your healthcare professional. [place] [address] any warranty or liability for your use of this information. DASH Diet: Care Instructions  Your Care Instructions  The DASH diet is an eating plan that can help lower your blood pressure. DASH stands for Dietary Approaches to Stop Hypertension. Hypertension is high blood pressure. The DASH diet focuses on eating foods that are high in calcium, potassium, and magnesium. These nutrients can lower blood pressure.  The foods that are highest in these nutrients are fruits, vegetables, low-fat dairy products, nuts, seeds, and legumes. But taking calcium, potassium, and magnesium supplements instead of eating foods that are high in those nutrients does not have the same effect. The DASH diet also includes whole grains, fish, and poultry. The DASH diet is one of several lifestyle changes your doctor may recommend to lower your high blood pressure. Your doctor may also want you to decrease the amount of sodium in your diet. Lowering sodium while following the DASH diet can lower blood pressure even further than just the DASH diet alone. Follow-up care is a key part of your treatment and safety. Be sure to make and go to all appointments, and call your doctor if you are having problems. It's also a good idea to know your test results and keep a list of the medicines you take. How can you care for yourself at home? Following the DASH diet  · Eat 4 to 5 servings of fruit each day. A serving is 1 medium-sized piece of fruit, ½ cup chopped or canned fruit, 1/4 cup dried fruit, or 4 ounces (½ cup) of fruit juice. Choose fruit more often than fruit juice. · Eat 4 to 5 servings of vegetables each day. A serving is 1 cup of lettuce or raw leafy vegetables, ½ cup of chopped or cooked vegetables, or 4 ounces (½ cup) of vegetable juice. Choose vegetables more often than vegetable juice. · Get 2 to 3 servings of low-fat and fat-free dairy each day. A serving is 8 ounces of milk, 1 cup of yogurt, or 1 ½ ounces of cheese. · Eat 6 to 8 servings of grains each day. A serving is 1 slice of bread, 1 ounce of dry cereal, or ½ cup of cooked rice, pasta, or cooked cereal. Try to choose whole-grain products as much as possible. · Limit lean meat, poultry, and fish to 2 servings each day. A serving is 3 ounces, about the size of a deck of cards. · Eat 4 to 5 servings of nuts, seeds, and legumes (cooked dried beans, lentils, and split peas) each week.  A serving is 1/3 cup of nuts, 2 tablespoons of seeds, or ½ cup of cooked beans or peas. · Limit fats and oils to 2 to 3 servings each day. A serving is 1 teaspoon of vegetable oil or 2 tablespoons of salad dressing. · Limit sweets and added sugars to 5 servings or less a week. A serving is 1 tablespoon jelly or jam, ½ cup sorbet, or 1 cup of lemonade. · Eat less than 2,300 milligrams (mg) of sodium a day. If you limit your sodium to 1,500 mg a day, you can lower your blood pressure even more. Tips for success  · Start small. Do not try to make dramatic changes to your diet all at once. You might feel that you are missing out on your favorite foods and then be more likely to not follow the plan. Make small changes, and stick with them. Once those changes become habit, add a few more changes. · Try some of the following:  ¨ Make it a goal to eat a fruit or vegetable at every meal and at snacks. This will make it easy to get the recommended amount of fruits and vegetables each day. ¨ Try yogurt topped with fruit and nuts for a snack or healthy dessert. ¨ Add lettuce, tomato, cucumber, and onion to sandwiches. ¨ Combine a ready-made pizza crust with low-fat mozzarella cheese and lots of vegetable toppings. Try using tomatoes, squash, spinach, broccoli, carrots, cauliflower, and onions. ¨ Have a variety of cut-up vegetables with a low-fat dip as an appetizer instead of chips and dip. ¨ Sprinkle sunflower seeds or chopped almonds over salads. Or try adding chopped walnuts or almonds to cooked vegetables. ¨ Try some vegetarian meals using beans and peas. Add garbanzo or kidney beans to salads. Make burritos and tacos with mashed valdivia beans or black beans. Where can you learn more? Go to http://braden-soto.info/. Enter I555 in the search box to learn more about \"DASH Diet: Care Instructions. \"  Current as of: April 3, 2017  Content Version: 11.3  © 4660-1373 [employer].  Care instructions adapted under license by [employer] (which disclaims liability or warranty for this information). If you have questions about a medical condition or this instruction, always ask your healthcare professional. [address] any warranty or liability for your use of this information.

## 2017-10-05 NOTE — PROGRESS NOTES
Diamond Levy  45 y.o. female  1965  [address]  [id number]     [facility]: Progress Note       Encounter Date: 10/5/2017    Chief Complaint   Patient presents with    Blood Pressure Check    Immunization/Injection     Flu Shot     History of Present Illness   Diamond Levy is a 45 y.o. female who presents to clinic today for    Hypertension: Controlled   BP Readings from Last 3 Encounters:   10/05/17 119/78   04/27/17 117/65   03/23/17 155/88     The patient reports:  taking medications as instructed, no medication side effects noted, no TIA's, no chest pain on exertion, no dyspnea on exertion, no swelling of ankles, Has been out of atenolol due to shortage and has yet to  meto. Lab Results   Component Value Date/Time    Sodium 135 03/23/2017 11:17 AM    Potassium 3.9 03/23/2017 11:17 AM    Chloride 95 03/23/2017 11:17 AM    CO2 23 03/23/2017 11:17 AM    Anion gap 9 07/22/2009 03:42 PM    Glucose 89 03/23/2017 11:17 AM    BUN 13 03/23/2017 11:17 AM    Creatinine 0.80 03/23/2017 11:17 AM    BUN/Creatinine ratio 16 03/23/2017 11:17 AM    GFR est AA 97 03/23/2017 11:17 AM    GFR est non-AA 84 03/23/2017 11:17 AM    Calcium 9.7 03/23/2017 11:17 AM    Bilirubin, total 0.5 11/18/2015 08:55 AM    AST (SGOT) 16 11/18/2015 08:55 AM    Alk. phosphatase 64 11/18/2015 08:55 AM    Protein, total 7.2 11/18/2015 08:55 AM    Albumin 4.1 11/18/2015 08:55 AM    A-G Ratio 1.3 11/18/2015 08:55 AM    ALT (SGPT) 12 11/18/2015 08:55 AM     Our goal is to normalize the blood pressure to decrease the risks of strokes and heart attacks. The patient is in agreement with the plan. Hyperlipidemia:  Controlled  Cardiovascular risks for her are: hypertension.    Currently she takes none  Lab Results   Component Value Date/Time    Cholesterol, total 214 11/18/2016 09:42 AM    HDL Cholesterol 71 11/18/2016 09:42 AM    LDL, calculated 125 11/18/2016 09:42 AM    Triglyceride 89 11/18/2016 09:42 AM     Lab Results   Component Value Date/Time    ALT (SGPT) 12 11/18/2015 08:55 AM    AST (SGOT) 16 11/18/2015 08:55 AM    Alk. phosphatase 64 11/18/2015 08:55 AM    Bilirubin, total 0.5 11/18/2015 08:55 AM     Myalgias: No  Fatigue: No  Wt Readings from Last 3 Encounters:   10/05/17 137 lb 9.6 oz (62.4 kg)   04/27/17 138 lb (62.6 kg)   03/23/17 136 lb (61.7 kg)         Depression: Controlled on current medications. Doing well on celexa at current dose. The patient has no suicidal ideation, psychotic features or addictions. Health Maintenance  Health Maintenance Due   Topic Date Due    Hepatitis C Screening  1965    DTaP/Tdap/Td series (1 - Tdap) 06/16/1984    PAP AKA CERVICAL CYTOLOGY  01/21/2012    BREAST CANCER SCRN MAMMOGRAM  06/16/2013    INFLUENZA AGE 9 TO ADULT  08/01/2017    FOBT Q 1 YEAR AGE 50-75  11/18/2017     Review of Systems   Review of Systems   Constitutional: Negative for chills, fever and weight loss. HENT: Negative for congestion, sinus pain and sore throat. Eyes: Negative for discharge. Respiratory: Negative for cough, shortness of breath and wheezing. Cardiovascular: Negative for chest pain, palpitations and leg swelling. Gastrointestinal: Negative for abdominal pain, constipation, diarrhea, nausea and vomiting. Skin: Negative for itching and rash. Neurological: Negative for dizziness and headaches. Psychiatric/Behavioral: Negative for depression, hallucinations and suicidal ideas. The patient is not nervous/anxious and does not have insomnia. Vitals/Objective:     Vitals:    10/05/17 0902   BP: 119/78   Pulse: 87   Resp: 18   Temp: 98.5 °F (36.9 °C)   TempSrc: Oral   SpO2: 95%   Weight: 137 lb 9.6 oz (62.4 kg)   Height: 4' 9\" (1.448 m)     Body mass index is 29.78 kg/(m^2). Physical Exam   Constitutional: She appears well-developed and well-nourished. HENT:   Head: Normocephalic and atraumatic.    Right Ear: External ear normal. Left Ear: External ear normal.   Eyes: Conjunctivae are normal. Pupils are equal, round, and reactive to light. Neck: Normal range of motion. Neck supple. No thyromegaly present. Cardiovascular: Normal rate and regular rhythm. No murmur heard. Pulmonary/Chest: Effort normal and breath sounds normal. No respiratory distress. She has no wheezes. Musculoskeletal: Normal range of motion. She exhibits no edema or tenderness. No results found for this or any previous visit (from the past 24 hour(s)). Assessment and Plan:     Encounter Diagnoses     ICD-10-CM ICD-9-CM   1. Essential hypertension with goal blood pressure less than 140/90 I10 401.9   2. Mixed hyperlipidemia E78.2 272.2   3. Recurrent major depressive disorder, in full remission ([facility] [place] [facility].) F33.42 296.36   4. Need for hepatitis C screening test Z11.59 V73.89   5. Encounter for immunization Z23 V03.89       1. Essential hypertension with goal blood pressure less than 140/90  Well controlled will switch to metoprolol from atenolol due to storage.   - METABOLIC PANEL, BASIC  - metoprolol tartrate (LOPRESSOR) 50 mg tablet; Take 1 Tab by mouth two (2) times a day. Dispense: 180 Tab; Refill: 1  - hydroCHLOROthiazide (HYDRODIURIL) 25 mg tablet; Take 1 Tab by mouth daily. Dispense: 90 Tab; Refill: 1    2. Mixed hyperlipidemia  Working well on diet. - LIPID PANEL    3. Recurrent major depressive disorder, in full remission [facility])  Patient feels good on celexa. Would like to continue medication at this time. 4. Need for hepatitis C screening test  - HEPATITIS C AB    5. Encounter for immunization  - INFLUENZA VIRUS VACCINE QUADRIVALENT, PRESERVATIVE FREE SYRINGE ([id number])  - VA IMMUNIZ ADMIN,1 SINGLE/COMB VAC/TOXOID    I have discussed the diagnosis with the patient and the intended plan as seen in the above orders. she has expressed understanding. The patient has received an after-visit summary and questions were answered concerning future plans. I have discussed medication side effects and warnings with the patient as well. Electronically Signed: German Sena MD     History/Allergies   Patients past medical, surgical and family histories were reviewed and updated. Past Medical History:   Diagnosis Date    Depression 8/30/2010    GERD (gastroesophageal reflux disease) 12/30/2011    Hyperlipidemia 12/30/2011    Hypertension       Past Surgical History:   Procedure Laterality Date    CARDIAC SURG PROCEDURE UNLIST  1974    closed hole in heart    HX HERNIA REPAIR  1983    HX TUBAL LIGATION  1995     Family History   Problem Relation Age of Onset    Diabetes Mother     Hypertension Mother     Cancer Maternal Grandmother     Asthma Daughter     Alcohol abuse Neg Hx     Arthritis-rheumatoid Neg Hx     Bleeding Prob Neg Hx     Elevated Lipids Neg Hx     Headache Neg Hx     Heart Disease Neg Hx     Lung Disease Neg Hx     Migraines Neg Hx     Psychiatric Disorder Neg Hx     Stroke Neg Hx     Mental Retardation Neg Hx      Social History     Social History    Marital status: SINGLE     Spouse name: N/A    Number of children: N/A    Years of education: N/A     Occupational History    Not on file. Social History Main Topics    Smoking status: Never Smoker    Smokeless tobacco: Never Used    Alcohol use Yes      Comment: 2 beers every other day    Drug use: No    Sexual activity: Yes     Other Topics Concern    Not on file     Social History Narrative         No Known Allergies    Disposition     Follow-up Disposition:  Return in about 6 months (around 4/5/2018) for Routine HTN, HLD. No future appointments. Current Medications after this visit     Current Outpatient Prescriptions   Medication Sig    MULTIVITAMIN WITH MINERALS (ONE-A-DAY 50 PLUS PO) Take  by mouth.  metoprolol tartrate (LOPRESSOR) 50 mg tablet Take 1 Tab by mouth two (2) times a day.     hydroCHLOROthiazide (HYDRODIURIL) 25 mg tablet Take 1 Tab by mouth daily.  cholecalciferol, vitamin D3, (VITAMIN D3) 2,000 unit tab Take  by mouth.  citalopram (CELEXA) 10 mg tablet Take 1 Tab by mouth daily.  naproxen (NAPROSYN) 500 mg tablet Take 1 Tab by mouth two (2) times daily (with meals).  diphenhydrAMINE (BENADRYL) 12.5 mg/5 mL Take 10 mL by mouth every six (6) hours as needed for Itching or Allergies. Indications: allergic reaction     No current facility-administered medications for this visit.       Medications Discontinued During This Encounter   Medication Reason    metoprolol tartrate (LOPRESSOR) 50 mg tablet Reorder    hydroCHLOROthiazide (HYDRODIURIL) 25 mg tablet Reorder

## 2017-10-05 NOTE — MR AVS SNAPSHOT
Visit Information Date & Time Provider Department Dept. Phone Encounter #  
 10/5/2017  9:10 AM Holland Browning MD  Piotr Hamilton [id number] Follow-up Instructions Return in about 6 months (around 4/5/2018) for Routine HTN, HLD. Upcoming Health Maintenance Date Due Hepatitis C Screening 1965 DTaP/Tdap/Td series (1 - Tdap) 6/16/1984 PAP AKA CERVICAL CYTOLOGY 1/21/2012 BREAST CANCER SCRN MAMMOGRAM 6/16/2013 INFLUENZA AGE 9 TO ADULT 8/1/2017 FOBT Q 1 YEAR AGE 50-75 11/18/2017 Allergies as of 10/5/2017  Review Complete On: 10/5/2017 By: Holland Browning MD  
 No Known Allergies Current Immunizations  Reviewed on 8/30/2010 Name Date H1N1 FLU VACCINE 1/14/2010 Influenza Vaccine (Quad) PF  Incomplete, 11/18/2016 Not reviewed this visit You Were Diagnosed With   
  
 Codes Comments Essential hypertension    -  Primary ICD-10-CM: I10 
ICD-9-CM: 401.9 Mixed hyperlipidemia     ICD-10-CM: E78.2 ICD-9-CM: 272.2 Essential hypertension with goal blood pressure less than 140/90     ICD-10-CM: I10 
ICD-9-CM: 401.9 Need for hepatitis C screening test     ICD-10-CM: Z11.59 
ICD-9-CM: V73.89 Encounter for immunization     ICD-10-CM: M76 ICD-9-CM: V03.89 Vitals BP Pulse Temp Resp Height(growth percentile) Weight(growth percentile) 119/78 (BP 1 Location: Left arm, BP Patient Position: Sitting) 87 98.5 °F (36.9 °C) (Oral) 18 4' 9\" (1.448 m) 137 lb 9.6 oz (62.4 kg) SpO2 BMI OB Status Smoking Status 95% 29.78 kg/m2 Premenopausal Never Smoker Vitals History BMI and BSA Data Body Mass Index Body Surface Area  
 29.78 kg/m 2 1.58 m 2 Preferred Pharmacy Pharmacy Name Phone [facility] [address] [phone] Your Updated Medication List  
  
   
 This list is accurate as of: 10/5/17  9:18 AM.  Always use your most recent med list.  
  
  
  
  
 citalopram 10 mg tablet Commonly known as:  Abby Rigo Take 1 Tab by mouth daily. diphenhydrAMINE 12.5 mg/5 mL Commonly known as:  BENADRYL Take 10 mL by mouth every six (6) hours as needed for Itching or Allergies. Indications: allergic reaction  
  
 hydroCHLOROthiazide 25 mg tablet Commonly known as:  HYDRODIURIL Take 1 Tab by mouth daily. metoprolol tartrate 50 mg tablet Commonly known as:  LOPRESSOR Take 1 Tab by mouth two (2) times a day. naproxen 500 mg tablet Commonly known as:  NAPROSYN Take 1 Tab by mouth two (2) times daily (with meals). ONE-A-DAY 50 PLUS PO Take  by mouth. VITAMIN D3 2,000 unit Tab Generic drug:  cholecalciferol (vitamin D3) Take  by mouth. Prescriptions Sent to Pharmacy Refills  
 metoprolol tartrate (LOPRESSOR) 50 mg tablet 1 Sig: Take 1 Tab by mouth two (2) times a day. Class: Normal  
 Pharmacy: [place] [id number] Ph #: [phone] Route: Oral  
 hydroCHLOROthiazide (HYDRODIURIL) 25 mg tablet 1 Sig: Take 1 Tab by mouth daily. Class: Normal  
 Pharmacy: [place] [id number] Ph #: [phone] Route: Oral  
  
We Performed the Following HEPATITIS C AB [31310 CPT(R)] INFLUENZA VIRUS VAC QUAD,SPLIT,PRESV FREE SYRINGE IM [id number] CPT(R)] LIPID PANEL [26299 CPT(R)] METABOLIC PANEL, BASIC [16870 CPT(R)] [place] IMMUNIZ ADMIN,1 SINGLE/COMB VAC/TOXOID [id number] CPT(R)] Follow-up Instructions Return in about 6 months (around 4/5/2018) for Routine HTN, HLD. Patient Instructions Influenza (Flu) Vaccine (Inactivated or Recombinant): What You Need to Know Why get vaccinated?  
Influenza (\"flu\") is a contagious disease that spreads around the [facility] every winter, usually between October and May. Flu is caused by influenza viruses and is spread mainly by coughing, sneezing, and close contact. Anyone can get flu. Flu strikes suddenly and can last several days. Symptoms vary by age, but can include: · Fever/chills. · Sore throat. · Muscle aches. · Fatigue. · Cough. · Headache. · Runny or stuffy nose. Flu can also lead to pneumonia and blood infections, and cause diarrhea and seizures in children. If you have a medical condition, such as heart or lung disease, flu can make it worse. Flu is more dangerous for some people. Infants and young children, people 72years of age and older, pregnant women, and people with certain health conditions or a weakened immune system are at greatest risk. Each year thousands of people in the [facility] die from flu, and many more are hospitalized. Flu vaccine can: · Keep you from getting flu. · Make flu less severe if you do get it. · Keep you from spreading flu to your family and other people. Inactivated and recombinant flu vaccines A dose of flu vaccine is recommended every flu season. Children 6 months through 6years of age may need two doses during the same flu season. Everyone else needs only one dose each flu season. Some inactivated flu vaccines contain a very small amount of a mercury-based preservative called thimerosal. Studies have not shown thimerosal in vaccines to be harmful, but flu vaccines that do not contain thimerosal are available. There is no live flu virus in flu shots. They cannot cause the flu. There are many flu viruses, and they are always changing. Each year a new flu vaccine is made to protect against three or four viruses that are likely to cause disease in the upcoming flu season. But even when the vaccine doesn't exactly match these viruses, it may still provide some protection. Flu vaccine cannot prevent: · Flu that is caused by a virus not covered by the vaccine. · Illnesses that look like flu but are not. Some people should not get this vaccine Tell the person who is giving you the vaccine: · If you have any severe (life-threatening) allergies. If you ever had a life-threatening allergic reaction after a dose of flu vaccine, or have a severe allergy to any part of this vaccine, you may be advised not to get vaccinated. Most, but not all, types of flu vaccine contain a small amount of egg protein. · If you ever had Guillain-Barré syndrome (also called GBS) Some people with a history of GBS should not get this vaccine. This should be discussed with your doctor. · If you are not feeling well. It is usually okay to get flu vaccine when you have a mild illness, but you might be asked to come back when you feel better. Risks of a vaccine reaction With any medicine, including vaccines, there is a chance of reactions. These are usually mild and go away on their own, but serious reactions are also possible. Most people who get a flu shot do not have any problems with it. Minor problems following a flu shot include: · Soreness, redness, or swelling where the shot was given · Hoarseness · Sore, red or itchy eyes · Cough · Fever · Aches · Headache · Itching · Fatigue If these problems occur, they usually begin soon after the shot and last 1 or 2 days. More serious problems following a flu shot can include the following: · There may be a small increased risk of Guillain-Barré Syndrome (GBS) after inactivated flu vaccine. This risk has been estimated at 1 or 2 additional cases per million people vaccinated. This is much lower than the risk of severe complications from flu, which can be prevented by flu vaccine. · [facility] children who get the flu shot along with pneumococcal vaccine (PCV13) and/or DTaP vaccine at the same time might be slightly more likely to have a seizure caused by fever. Ask your doctor for more information. Tell your doctor if a child who is getting flu vaccine has ever had a seizure Problems that could happen after any injected vaccine: · People sometimes faint after a medical procedure, including vaccination. Sitting or lying down for about 15 minutes can help prevent fainting, and injuries caused by a fall. Tell your doctor if you feel dizzy, or have vision changes or ringing in the ears. · Some people get severe pain in the shoulder and have difficulty moving the arm where a shot was given. This happens very rarely. · Any medication can cause a severe allergic reaction. Such reactions from a vaccine are very rare, estimated at about 1 in a million doses, and would happen within a few minutes to a few hours after the vaccination. As with any medicine, there is a very remote chance of a vaccine causing a serious injury or death. The safety of vaccines is always being monitored. For more information, visit: www.cdc.gov/vaccinesafety/. What if there is a serious reaction? What should I look for? · Look for anything that concerns you, such as signs of a severe allergic reaction, very high fever, or unusual behavior. Signs of a severe allergic reaction can include hives, swelling of the face and throat, difficulty breathing, a fast heartbeat, dizziness, and weakness  usually within a few minutes to a few hours after the vaccination. What should I do? · If you think it is a severe allergic reaction or other emergency that can't wait, call 9-1-1 and get the person to the nearest hospital. Otherwise, call your doctor. · Reactions should be reported to the \"Vaccine Adverse Event Reporting System\" (VAERS). Your doctor should file this report, or you can do it yourself through the VAERS website at www.vaers. hhs.gov, or by calling 5-653.753.4479. VAERS does not give medical advice.  
The [employer] Vaccine Injury Compensation Program 
The [employer] Vaccine Injury Compensation Program (VICP) is a federal program that was created to compensate people who may have been injured by certain vaccines. Persons who believe they may have been injured by a vaccine can learn about the program and about filing a claim by calling 6-328.586.1994 or visiting the [employer] website at www.[facility].gov/vaccinecompensation. There is a time limit to file a claim for compensation. How can I learn more? · Ask your healthcare provider. He or she can give you the vaccine package insert or suggest other sources of information. · Call your local or state health department. · Contact the Centers for Disease Control and Prevention (CDC): 
¨ Call 8-112.370.5558 (1-800-CDC-INFO) or ¨ Visit CDC's website at www.cdc.gov/flu Vaccine Information Statement Inactivated Influenza Vaccine 8/7/2015) 42 U. Thelda Cushing [id number]-[id number] [facility] and [employer] Centers for Disease Control and Prevention Many Vaccine Information Statements are available in Mongolian and other languages. See www.immunize.org/vis. Muchas hojas de información sobre vacunas están disponibles en español y en otros idiomas. Visite www.immunize.org/vis. Care instructions adapted under license by [employer] (which disclaims liability or warranty for this information). If you have questions about a medical condition or this instruction, always ask your healthcare professional. [address] any warranty or liability for your use of this information. DASH Diet: Care Instructions Your Care Instructions The DASH diet is an eating plan that can help lower your blood pressure. DASH stands for Dietary Approaches to Stop Hypertension. Hypertension is high blood pressure. The DASH diet focuses on eating foods that are high in calcium, potassium, and magnesium. These nutrients can lower blood pressure.  The foods that are highest in these nutrients are fruits, vegetables, low-fat dairy products, nuts, seeds, and legumes. But taking calcium, potassium, and magnesium supplements instead of eating foods that are high in those nutrients does not have the same effect. The DASH diet also includes whole grains, fish, and poultry. The DASH diet is one of several lifestyle changes your doctor may recommend to lower your high blood pressure. Your doctor may also want you to decrease the amount of sodium in your diet. Lowering sodium while following the DASH diet can lower blood pressure even further than just the DASH diet alone. Follow-up care is a key part of your treatment and safety. Be sure to make and go to all appointments, and call your doctor if you are having problems. It's also a good idea to know your test results and keep a list of the medicines you take. How can you care for yourself at home? Following the DASH diet · Eat 4 to 5 servings of fruit each day. A serving is 1 medium-sized piece of fruit, ½ cup chopped or canned fruit, 1/4 cup dried fruit, or 4 ounces (½ cup) of fruit juice. Choose fruit more often than fruit juice. · Eat 4 to 5 servings of vegetables each day. A serving is 1 cup of lettuce or raw leafy vegetables, ½ cup of chopped or cooked vegetables, or 4 ounces (½ cup) of vegetable juice. Choose vegetables more often than vegetable juice. · Get 2 to 3 servings of low-fat and fat-free dairy each day. A serving is 8 ounces of milk, 1 cup of yogurt, or 1 ½ ounces of cheese. · Eat 6 to 8 servings of grains each day. A serving is 1 slice of bread, 1 ounce of dry cereal, or ½ cup of cooked rice, pasta, or cooked cereal. Try to choose whole-grain products as much as possible. · Limit lean meat, poultry, and fish to 2 servings each day. A serving is 3 ounces, about the size of a deck of cards. · Eat 4 to 5 servings of nuts, seeds, and legumes (cooked dried beans, lentils, and split peas) each week.  A serving is 1/3 cup of nuts, 2 tablespoons of seeds, or ½ cup of cooked beans or peas. · Limit fats and oils to 2 to 3 servings each day. A serving is 1 teaspoon of vegetable oil or 2 tablespoons of salad dressing. · Limit sweets and added sugars to 5 servings or less a week. A serving is 1 tablespoon jelly or jam, ½ cup sorbet, or 1 cup of lemonade. · Eat less than 2,300 milligrams (mg) of sodium a day. If you limit your sodium to 1,500 mg a day, you can lower your blood pressure even more. Tips for success · Start small. Do not try to make dramatic changes to your diet all at once. You might feel that you are missing out on your favorite foods and then be more likely to not follow the plan. Make small changes, and stick with them. Once those changes become habit, add a few more changes. · Try some of the following: ¨ Make it a goal to eat a fruit or vegetable at every meal and at snacks. This will make it easy to get the recommended amount of fruits and vegetables each day. ¨ Try yogurt topped with fruit and nuts for a snack or healthy dessert. ¨ Add lettuce, tomato, cucumber, and onion to sandwiches. ¨ Combine a ready-made pizza crust with low-fat mozzarella cheese and lots of vegetable toppings. Try using tomatoes, squash, spinach, broccoli, carrots, cauliflower, and onions. ¨ Have a variety of cut-up vegetables with a low-fat dip as an appetizer instead of chips and dip. ¨ Sprinkle sunflower seeds or chopped almonds over salads. Or try adding chopped walnuts or almonds to cooked vegetables. ¨ Try some vegetarian meals using beans and peas. Add garbanzo or kidney beans to salads. Make burritos and tacos with mashed valdivia beans or black beans. Where can you learn more? Go to http://braden-soto.info/. Enter L526 in the search box to learn more about \"DASH Diet: Care Instructions. \" Current as of: April 3, 2017 Content Version: 11.3 © 1160-3252 [employer].  Care instructions adapted under license by [address] (which disclaims liability or warranty for this information). If you have questions about a medical condition or this instruction, always ask your healthcare professional. [address] any warranty or liability for your use of this information. Introducing [facility]! Dear Mala Carreno: Thank you for requesting a [employer] account. Our records indicate that you have previously registered for a [employer] account but its currently inactive. Please call our [employer] support line at [phone]. Additional Information If you have questions, please visit the Frequently Asked Questions section of the [employer] website at [email or web address] [email or web address]. [email or web address] Remember, [employer] is NOT to be used for urgent needs. For medical emergencies, dial 911. Now available from your iPhone and Android! Please provide this summary of care documentation to your next provider. Your primary care clinician is listed as Percy Engle. If you have any questions after today's visit, please call [phone].

## 2017-10-06 LAB
BUN SERPL-MCNC: 9 MG/DL (ref 6–24)
BUN/CREAT SERPL: 10 (ref 9–23)
CALCIUM SERPL-MCNC: 10 MG/DL (ref 8.7–10.2)
CHLORIDE SERPL-SCNC: 96 MMOL/L (ref 96–106)
CHOLEST SERPL-MCNC: 210 MG/DL (ref 100–199)
CO2 SERPL-SCNC: 23 MMOL/L (ref 18–29)
CREAT SERPL-MCNC: 0.9 MG/DL (ref 0.57–1)
GLUCOSE SERPL-MCNC: 86 MG/DL (ref 65–99)
HCV AB S/CO SERPL IA: <0.1 S/CO RATIO (ref 0–0.9)
HDLC SERPL-MCNC: 80 MG/DL
LDLC SERPL CALC-MCNC: 111 MG/DL (ref 0–99)
POTASSIUM SERPL-SCNC: 3.8 MMOL/L (ref 3.5–5.2)
SODIUM SERPL-SCNC: 139 MMOL/L (ref 134–144)
TRIGL SERPL-MCNC: 94 MG/DL (ref 0–149)
VLDLC SERPL CALC-MCNC: 19 MG/DL (ref 5–40)

## 2017-12-23 DIAGNOSIS — I10 ESSENTIAL HYPERTENSION WITH GOAL BLOOD PRESSURE LESS THAN 140/90: ICD-10-CM

## 2017-12-26 RX ORDER — LISINOPRIL 20 MG/1
TABLET ORAL
Qty: 180 TAB | Refills: 1 | Status: SHIPPED | OUTPATIENT
Start: 2017-12-26 | End: 2018-10-04 | Stop reason: SDUPTHER

## 2018-03-27 ENCOUNTER — OFFICE VISIT (OUTPATIENT)
Dept: FAMILY MEDICINE CLINIC | Age: 53
End: 2018-03-27

## 2018-03-27 VITALS
RESPIRATION RATE: 18 BRPM | DIASTOLIC BLOOD PRESSURE: 84 MMHG | TEMPERATURE: 97.4 F | OXYGEN SATURATION: 98 % | HEART RATE: 79 BPM | WEIGHT: 145.5 LBS | SYSTOLIC BLOOD PRESSURE: 145 MMHG | BODY MASS INDEX: 31.39 KG/M2 | HEIGHT: 57 IN

## 2018-03-27 DIAGNOSIS — E78.2 MIXED HYPERLIPIDEMIA: ICD-10-CM

## 2018-03-27 DIAGNOSIS — R01.1 NEWLY RECOGNIZED HEART MURMUR: ICD-10-CM

## 2018-03-27 DIAGNOSIS — I10 ESSENTIAL HYPERTENSION: ICD-10-CM

## 2018-03-27 DIAGNOSIS — R00.2 HEART PALPITATIONS: Primary | ICD-10-CM

## 2018-03-27 DIAGNOSIS — E55.9 VITAMIN D DEFICIENCY: ICD-10-CM

## 2018-03-27 RX ORDER — LOVASTATIN 20 MG/1
20 TABLET ORAL
Qty: 90 TAB | Refills: 1 | Status: SHIPPED | OUTPATIENT
Start: 2018-03-27 | End: 2018-12-04 | Stop reason: SDUPTHER

## 2018-03-27 RX ORDER — ASPIRIN 81 MG/1
81 TABLET ORAL DAILY
Qty: 90 TAB | Refills: 1 | Status: SHIPPED | OUTPATIENT
Start: 2018-03-27 | End: 2018-10-04 | Stop reason: SDUPTHER

## 2018-03-27 NOTE — PATIENT INSTRUCTIONS
Palpitations: Care Instructions  Your Care Instructions    Heart palpitations are the uncomfortable sensation that your heart is beating fast or irregularly. You might feel pounding or fluttering in your chest. It might feel like your heart is skipping a beat. Although palpitations may be caused by a heart problem, they also occur because of stress, fatigue, or use of alcohol, caffeine, or nicotine. Many medicines, including diet pills, antihistamines, decongestants, and some herbal products, can cause heart palpitations. Nearly everyone has palpitations from time to time. Depending on your symptoms, your doctor may need to do more tests to try to find the cause of your palpitations. Follow-up care is a key part of your treatment and safety. Be sure to make and go to all appointments, and call your doctor if you are having problems. It's also a good idea to know your test results and keep a list of the medicines you take. How can you care for yourself at home? · Avoid caffeine, nicotine, and excess alcohol. · Do not take illegal drugs, such as methamphetamines and cocaine. · Do not take weight loss or diet medicines unless you talk with your doctor first.  · Get plenty of sleep. · Do not overeat. · If you have palpitations again, take deep breaths and try to relax. This may slow a racing heart. · If you start to feel lightheaded, lie down to avoid injuries that might result if you pass out and fall down. · Keep a record of your palpitations and bring it to your next doctor's appointment. Write down:  ¨ The date and time. ¨ Your pulse. (If your heart is beating fast, it may be hard to count your pulse.)  ¨ What you were doing when the palpitations started. ¨ How long the palpitations lasted. ¨ Any other symptoms. · If an activity causes palpitations, slow down or stop. Talk to your doctor before you do that activity again. · Take your medicines exactly as prescribed.  Call your doctor if you think you are having a problem with your medicine. When should you call for help? Call 911 anytime you think you may need emergency care. For example, call if:  ? · You passed out (lost consciousness). ? · You have symptoms of a heart attack. These may include:  ¨ Chest pain or pressure, or a strange feeling in the chest.  ¨ Sweating. ¨ Shortness of breath. ¨ Pain, pressure, or a strange feeling in the back, neck, jaw, or upper belly or in one or both shoulders or arms. ¨ Lightheadedness or sudden weakness. ¨ A fast or irregular heartbeat. After you call 911, the  may tell you to chew 1 adult-strength or 2 to 4 low-dose aspirin. Wait for an ambulance. Do not try to drive yourself. ? · You have symptoms of a stroke. These may include:  ¨ Sudden numbness, tingling, weakness, or loss of movement in your face, arm, or leg, especially on only one side of your body. ¨ Sudden vision changes. ¨ Sudden trouble speaking. ¨ Sudden confusion or trouble understanding simple statements. ¨ Sudden problems with walking or balance. ¨ A sudden, severe headache that is different from past headaches. ?Call your doctor now or seek immediate medical care if:  ? · You have heart palpitations and:  ¨ Are dizzy or lightheaded, or you feel like you may faint. ¨ Have new or increased shortness of breath. ? Watch closely for changes in your health, and be sure to contact your doctor if:  ? · You continue to have heart palpitations. Where can you learn more? Go to http://braden-soto.info/. Enter R508 in the search box to learn more about \"Palpitations: Care Instructions. \"  Current as of: September 21, 2016  Content Version: 11.4  © 0205-0127 [employer]. Care instructions adapted under license by [employer] (which disclaims liability or warranty for this information).  If you have questions about a medical condition or this instruction, always ask your healthcare professional. [address] any warranty or liability for your use of this information.

## 2018-03-27 NOTE — PROGRESS NOTES
Reviewed record in preparation for visit and have necessary documentation  Pt did not bring medication to office visit for review    Goals that were addressed and/or need to be completed during or after this appointment include   Health Maintenance Due   Topic Date Due    DTaP/Tdap/Td series (1 - Tdap) 06/16/1984    PAP AKA CERVICAL CYTOLOGY  01/21/2012    BREAST CANCER SCRN MAMMOGRAM  06/16/2013    FOBT Q 1 YEAR AGE 50-75  11/18/2017

## 2018-03-27 NOTE — MR AVS SNAPSHOT
[facility] 
 
 
 2007 [address] [address] 
[phone] Patient: Momo Gibson MRN: [id number] NID:[id number] Visit Information Date & Time Provider Department Dept. Phone Encounter #  
 3/27/2018  9:10 AM Sanjuana Lesches,  [facility] [phone] [id number] Follow-up Instructions Return in about 4 weeks (around 4/24/2018). Upcoming Health Maintenance Date Due DTaP/Tdap/Td series (1 - Tdap) 6/16/1984 PAP AKA CERVICAL CYTOLOGY 1/21/2012 BREAST CANCER SCRN MAMMOGRAM 6/16/2013 FOBT Q 1 YEAR AGE 50-75 11/18/2017 Allergies as of 3/27/2018  Review Complete On: 3/27/2018 By: Do Sanabria No Known Allergies Current Immunizations  Reviewed on 8/30/2010 Name Date H1N1 FLU VACCINE 1/14/2010 Influenza Vaccine (Quad) PF 10/5/2017, 11/18/2016 Not reviewed this visit You Were Diagnosed With   
  
 Codes Comments Heart palpitations    -  Primary ICD-10-CM: R00.2 ICD-9-CM: 785.1 Newly recognized heart murmur     ICD-10-CM: R01.1 ICD-9-CM: 785.2 Essential hypertension     ICD-10-CM: I10 
ICD-9-CM: 401.9 Mixed hyperlipidemia     ICD-10-CM: E78.2 ICD-9-CM: 272.2 Vitamin D deficiency     ICD-10-CM: E55.9 ICD-9-CM: 268.9 BMI 31.0-31.9,adult     ICD-10-CM: Z68.31 
ICD-9-CM: V85.31 Vitals BP Pulse Temp Resp Height(growth percentile) Weight(growth percentile) 145/84 (BP 1 Location: Right arm, BP Patient Position: Sitting) 79 97.4 °F (36.3 °C) (Oral) 18 4' 9\" (1.448 m) 145 lb 8 oz (66 kg) SpO2 BMI OB Status Smoking Status 98% 31.49 kg/m2 Premenopausal Never Smoker Vitals History BMI and BSA Data Body Mass Index Body Surface Area  
 31.49 kg/m 2 1.63 m 2 Preferred Pharmacy Pharmacy Name Phone [address] [address] [phone] Your Updated Medication List  
  
   
This list is accurate as of 3/27/18  9:37 AM.  Always use your most recent med list.  
  
  
  
  
 aspirin delayed-release 81 mg tablet Take 1 Tab by mouth daily. citalopram 10 mg tablet Commonly known as:  Zelphia Lennox Take 1 Tab by mouth daily. hydroCHLOROthiazide 25 mg tablet Commonly known as:  HYDRODIURIL Take 1 Tab by mouth daily. lisinopril 20 mg tablet Commonly known as:  PRINIVIL, ZESTRIL  
TAKE TWO TABLETS BY MOUTH ONCE DAILY lovastatin 20 mg tablet Commonly known as:  MEVACOR Take 1 Tab by mouth nightly. Indications: myocardial infarction prevention  
  
 metoprolol tartrate 50 mg tablet Commonly known as:  LOPRESSOR Take 1 Tab by mouth two (2) times a day. naproxen 500 mg tablet Commonly known as:  NAPROSYN Take 1 Tab by mouth two (2) times daily (with meals). ONE-A-DAY 50 PLUS PO Take  by mouth. VITAMIN D3 2,000 unit Tab Generic drug:  cholecalciferol (vitamin D3) Take  by mouth. Prescriptions Sent to Pharmacy Refills  
 lovastatin (MEVACOR) 20 mg tablet 1 Sig: Take 1 Tab by mouth nightly. Indications: myocardial infarction prevention Class: Normal  
 Pharmacy: [address] [place] [address] Ph #: [phone] Route: Oral  
 aspirin delayed-release 81 mg tablet 1 Sig: Take 1 Tab by mouth daily. Class: Normal  
 Pharmacy: [address] [place] [address] Ph #: [phone] Route: Oral  
  
We Performed the Following AMB POC EKG ROUTINE W/ 12 LEADS, INTER & REP [20021 CPT(R)] HEMOGLOBIN A1C WITH EAG [50188 CPT(R)] LIPID PANEL [44757 CPT(R)] MAGNESIUM [id number] CPT(R)] METABOLIC PANEL, BASIC [43555 CPT(R)] REFERRAL TO CARDIOLOGY [CNG65 Custom] TSH 3RD GENERATION [69398 CPT(R)] VITAMIN D, 25 HYDROXY [id number] CPT(R)] Follow-up Instructions Return in about 4 weeks (around 4/24/2018). Referral Information Referral ID Referred By Referred To  
  
 [id number] [facility], [facility] [address] [place], [facility] [address] Phone: [phone] Fax: [phone] Visits Status Start Date End Date 1 New Request 3/27/18 3/27/19 If your referral has a status of pending review or denied, additional information will be sent to support the outcome of this decision. Patient Instructions Palpitations: Care Instructions Your Care Instructions Heart palpitations are the uncomfortable sensation that your heart is beating fast or irregularly. You might feel pounding or fluttering in your chest. It might feel like your heart is skipping a beat. Although palpitations may be caused by a heart problem, they also occur because of stress, fatigue, or use of alcohol, caffeine, or nicotine. Many medicines, including diet pills, antihistamines, decongestants, and some herbal products, can cause heart palpitations. Nearly everyone has palpitations from time to time. Depending on your symptoms, your doctor may need to do more tests to try to find the cause of your palpitations. Follow-up care is a key part of your treatment and safety. Be sure to make and go to all appointments, and call your doctor if you are having problems. It's also a good idea to know your test results and keep a list of the medicines you take. How can you care for yourself at home? · Avoid caffeine, nicotine, and excess alcohol. · Do not take illegal drugs, such as methamphetamines and cocaine. · Do not take weight loss or diet medicines unless you talk with your doctor first. 
· Get plenty of sleep. · Do not overeat. · If you have palpitations again, take deep breaths and try to relax. This may slow a racing heart.  
· If you start to feel lightheaded, lie down to avoid injuries that might result if you pass out and fall down. · Keep a record of your palpitations and bring it to your next doctor's appointment. Write down: ¨ The date and time. ¨ Your pulse. (If your heart is beating fast, it may be hard to count your pulse.) ¨ What you were doing when the palpitations started. ¨ How long the palpitations lasted. ¨ Any other symptoms. · If an activity causes palpitations, slow down or stop. Talk to your doctor before you do that activity again. · Take your medicines exactly as prescribed. Call your doctor if you think you are having a problem with your medicine. When should you call for help? Call 911 anytime you think you may need emergency care. For example, call if: 
? · You passed out (lost consciousness). ? · You have symptoms of a heart attack. These may include: ¨ Chest pain or pressure, or a strange feeling in the chest. 
¨ Sweating. ¨ Shortness of breath. ¨ Pain, pressure, or a strange feeling in the back, neck, jaw, or upper belly or in one or both shoulders or arms. ¨ Lightheadedness or sudden weakness. ¨ A fast or irregular heartbeat. After you call 911, the  may tell you to chew 1 adult-strength or 2 to 4 low-dose aspirin. Wait for an ambulance. Do not try to drive yourself. ? · You have symptoms of a stroke. These may include: 
¨ Sudden numbness, tingling, weakness, or loss of movement in your face, arm, or leg, especially on only one side of your body. ¨ Sudden vision changes. ¨ Sudden trouble speaking. ¨ Sudden confusion or trouble understanding simple statements. ¨ Sudden problems with walking or balance. ¨ A sudden, severe headache that is different from past headaches. ?Call your doctor now or seek immediate medical care if: 
? · You have heart palpitations and: ¨ Are dizzy or lightheaded, or you feel like you may faint. ¨ Have new or increased shortness of breath. ? Watch closely for changes in your health, and be sure to contact your doctor if: ? · You continue to have heart palpitations. Where can you learn more? Go to http://braden-soto.info/. Enter R508 in the search box to learn more about \"Palpitations: Care Instructions. \" Current as of: September 21, 2016 Content Version: 11.4 © 7553-4031 [employer]. Care instructions adapted under license by [employer] (which disclaims liability or warranty for this information). If you have questions about a medical condition or this instruction, always ask your healthcare professional. [address] any warranty or liability for your use of this information. Introducing \[facility]\"" & HEALTH SERVICES! Dear Estela Chacon: Thank you for requesting a Ynsect account. Our records indicate that you have previously registered for a Ynsect account but its currently inactive. Please call our Ynsect support line at [phone]. Additional Information If you have questions, please visit the Frequently Asked Questions section of the Ynsect website at [email or web address] Remember, Ynsect is NOT to be used for urgent needs. For medical emergencies, dial 911. Now available from your iPhone and Android! Please provide this summary of care documentation to your next provider. Your primary care clinician is listed as Edwar Rueda. If you have any questions after today's visit, please call [phone].

## 2018-03-27 NOTE — PROGRESS NOTES
Megan Bose  45 y.o. female  1965  0 [facility]  [phone]     [facility]: Progress Note       Encounter Date: 3/27/2018    Chief Complaint   Patient presents with    Hypertension    Diabetes     wants to be checked        History provided by patient  History of Present Illness   Megan Bose is a 45 y.o. female who presents to clinic today for:    Palpitaltions  Patient present with cc of palpitations/\"flobbing\" x 1 week. Associated with weakness and SOB with walking down a walkway. Denies CP. Had been referred to cardiology in the past and was scheduled for an ECHO but due to transportation issues she never went to ER      Hypertension: Worsening   BP Readings from Last 3 Encounters:   03/27/18 145/84   10/05/17 119/78   04/27/17 117/65     The patient reports:  taking medications as instructed, no medication side effects noted, no TIA's, no chest pain on exertion, no swelling of ankles.      Sodium   Date Value Ref Range Status   10/05/2017 139 134 - 144 mmol/L Final     Potassium   Date Value Ref Range Status   10/05/2017 3.8 3.5 - 5.2 mmol/L Final     Magnesium   Date Value Ref Range Status   10/07/2011 1.7 1.6 - 2.6 mg/dL Final     Comment:     Performed At: [address] [place] [address]  [address] [address]  Nati Saeed MD  [phone]     Chloride   Date Value Ref Range Status   10/05/2017 96 96 - 106 mmol/L Final     Creatinine   Date Value Ref Range Status   10/05/2017 0.90 0.57 - 1.00 mg/dL Final   03/23/2017 0.80 0.57 - 1.00 mg/dL Final   11/18/2016 0.78 0.57 - 1.00 mg/dL Final     GFR est AA   Date Value Ref Range Status   10/05/2017 84 >59 mL/min/1.73 Final   03/23/2017 97 >59 mL/min/1.73 Final   11/18/2016 100 >59 mL/min/1.73 Final     GFR est non-AA   Date Value Ref Range Status   10/05/2017 73 >59 mL/min/1.73 Final   03/23/2017 84 >59 mL/min/1.73 Final   11/18/2016 87 >59 mL/min/1.73 Final       Our goal is to normalize the blood pressure to decrease the risks of strokes and heart attacks. The patient is in agreement with the plan. Hyperlipidemia:  Uncontrolled  Cardiovascular risks for her are: hypertension  hyperlipidemia  obese. Currently she takes none   Myalgias: No  Cholesterol, total   Date Value Ref Range Status   10/05/2017 210 (H) 100 - 199 mg/dL Final   11/18/2016 214 (H) 100 - 199 mg/dL Final   11/18/2015 186 100 - 199 mg/dL Final     HDL Cholesterol   Date Value Ref Range Status   10/05/2017 80 >39 mg/dL Final   11/18/2016 71 >39 mg/dL Final   11/18/2015 80 >39 mg/dL Final     Comment:     According to ATP-III Guidelines, HDL-C >59 mg/dL is considered a  negative risk factor for CHD. Triglyceride   Date Value Ref Range Status   10/05/2017 94 0 - 149 mg/dL Final   11/18/2016 89 0 - 149 mg/dL Final   11/18/2015 83 0 - 149 mg/dL Final     ALT (SGPT)   Date Value Ref Range Status   11/18/2015 12 0 - 32 IU/L Final     AST (SGOT)   Date Value Ref Range Status   11/18/2015 16 0 - 40 IU/L Final     Alk. phosphatase   Date Value Ref Range Status   11/18/2015 64 39 - 117 IU/L Final       Wt Readings from Last 3 Encounters:   03/27/18 145 lb 8 oz (66 kg)   10/05/17 137 lb 9.6 oz (62.4 kg)   04/27/17 138 lb (62.6 kg)     BMI 31        Review of Systems   Review of Systems   Constitutional: Negative for chills, diaphoresis, malaise/fatigue and weight loss. Respiratory: Positive for shortness of breath. Negative for cough and wheezing. Cardiovascular: Positive for palpitations. Negative for chest pain, orthopnea, claudication and leg swelling. Gastrointestinal: Negative for abdominal pain, constipation, diarrhea, nausea and vomiting. Genitourinary: Negative for dysuria, frequency, hematuria and urgency. Skin: Negative for rash. Neurological: Positive for weakness. Negative for dizziness and headaches.        Vitals/Objective:     Vitals:    03/27/18 0858   BP: 145/84   Pulse: 79   Resp: 18   Temp: 97.4 °F (36.3 °C)   TempSrc: Oral   SpO2: 98%   Weight: 145 lb 8 oz (66 kg)   Height: 4' 9\" (1.448 m)     Body mass index is 31.49 kg/(m^2). Wt Readings from Last 3 Encounters:   03/27/18 145 lb 8 oz (66 kg)   10/05/17 137 lb 9.6 oz (62.4 kg)   04/27/17 138 lb (62.6 kg)       Physical Exam   Constitutional: She is oriented to person, place, and time. She appears well-developed and well-nourished. HENT:   Head: Normocephalic and atraumatic. Mouth/Throat: Oropharynx is clear and moist. No oropharyngeal exudate. Eyes: Conjunctivae are normal. Pupils are equal, round, and reactive to light. Neck: Normal range of motion. Cardiovascular: Normal rate. Occasional extrasystoles are present. Murmur heard. Systolic murmur is present with a grade of 2/6   Pulmonary/Chest: Effort normal and breath sounds normal. No respiratory distress. She has no wheezes. She has no rales. Abdominal: Soft. Bowel sounds are normal.   Musculoskeletal: She exhibits no edema or tenderness. Neurological: She is alert and oriented to person, place, and time. No results found for this or any previous visit (from the past 24 hour(s)). Assessment and Plan:     Encounter Diagnoses     ICD-10-CM ICD-9-CM   1. Heart palpitations R00.2 785.1   2. Newly recognized heart murmur R01.1 785.2   3. Essential hypertension I10 401.9   4. Mixed hyperlipidemia E78.2 272.2   5. Vitamin D deficiency E55.9 268.9   6. BMI 31.0-31.9,adult Z68.31 V85.31       1. Heart palpitations  2. Newly recognized heart murmur  I personally reviewed the EKG and noted NSR with inverted T waves in the anterior leads as well as few PVCs. Will sent to cardiology as she will need an ECHO for new murmur as well as current symptoms.   - AMB POC EKG ROUTINE W/ 12 LEADS, INTER & REP  - TSH 3RD GENERATION  - MAGNESIUM  - REFERRAL TO CARDIOLOGY    3. Essential hypertension  Near goal for age. - METABOLIC PANEL, BASIC  - aspirin delayed-release 81 mg tablet;  Take 1 Tab by mouth daily. Dispense: 90 Tab; Refill: 1    4. Mixed hyperlipidemia  Start statin. - LIPID PANEL  - lovastatin (MEVACOR) 20 mg tablet; Take 1 Tab by mouth nightly. Indications: myocardial infarction prevention  Dispense: 90 Tab; Refill: 1    5. Vitamin D deficiency  - VITAMIN D, 25 HYDROXY    6. BMI 31.0-31.9,adult  - HEMOGLOBIN A1C WITH EAG    I have discussed the diagnosis with the patient and the intended plan as seen in the above orders. she has expressed understanding. The patient has received an after-visit summary and questions were answered concerning future plans. I have discussed medication side effects and warnings with the patient as well. Electronically Signed: Miroslava Plaza MD     History/Allergies   Patients past medical, surgical and family histories were reviewed and updated. Past Medical History:   Diagnosis Date    Depression 8/30/2010    GERD (gastroesophageal reflux disease) 12/30/2011    Hyperlipidemia 12/30/2011    Hypertension       Past Surgical History:   Procedure Laterality Date    CARDIAC SURG PROCEDURE UNLIST  1974    closed hole in heart    HX HERNIA REPAIR  1983    HX TUBAL LIGATION  1995     Family History   Problem Relation Age of Onset    Diabetes Mother     Hypertension Mother     Cancer Maternal Grandmother     Asthma Daughter     Cancer Father      lung cancer    Diabetes Sister     Alcohol abuse Neg Hx     Arthritis-rheumatoid Neg Hx     Bleeding Prob Neg Hx     Elevated Lipids Neg Hx     Headache Neg Hx     Heart Disease Neg Hx     Lung Disease Neg Hx     Migraines Neg Hx     Psychiatric Disorder Neg Hx     Stroke Neg Hx     Mental Retardation Neg Hx      Social History     Social History    Marital status: SINGLE     Spouse name: N/A    Number of children: N/A    Years of education: N/A     Occupational History    Not on file.      Social History Main Topics    Smoking status: Never Smoker    Smokeless tobacco: Never Used   [facility] Alcohol use Yes      Comment: 2 beers every other day    Drug use: No    Sexual activity: Yes     Other Topics Concern    Not on file     Social History Narrative         No Known Allergies    Disposition     Follow-up Disposition:  Return in about 4 weeks (around 4/24/2018). No future appointments. Current Medications after this visit     Current Outpatient Prescriptions   Medication Sig    lovastatin (MEVACOR) 20 mg tablet Take 1 Tab by mouth nightly. Indications: myocardial infarction prevention    aspirin delayed-release 81 mg tablet Take 1 Tab by mouth daily.  lisinopril (PRINIVIL, ZESTRIL) 20 mg tablet TAKE TWO TABLETS BY MOUTH ONCE DAILY    MULTIVITAMIN WITH MINERALS (ONE-A-DAY 50 PLUS PO) Take  by mouth.  metoprolol tartrate (LOPRESSOR) 50 mg tablet Take 1 Tab by mouth two (2) times a day.  hydroCHLOROthiazide (HYDRODIURIL) 25 mg tablet Take 1 Tab by mouth daily.  cholecalciferol, vitamin D3, (VITAMIN D3) 2,000 unit tab Take  by mouth.  citalopram (CELEXA) 10 mg tablet Take 1 Tab by mouth daily.  naproxen (NAPROSYN) 500 mg tablet Take 1 Tab by mouth two (2) times daily (with meals). No current facility-administered medications for this visit.       Medications Discontinued During This Encounter   Medication Reason    diphenhydrAMINE (BENADRYL) 12.5 mg/5 mL Therapy Completed

## 2018-03-28 LAB
25(OH)D3+25(OH)D2 SERPL-MCNC: 24.7 NG/ML (ref 30–100)
BUN SERPL-MCNC: 10 MG/DL (ref 6–24)
BUN/CREAT SERPL: 15 (ref 9–23)
CALCIUM SERPL-MCNC: 9.8 MG/DL (ref 8.7–10.2)
CHLORIDE SERPL-SCNC: 95 MMOL/L (ref 96–106)
CHOLEST SERPL-MCNC: 199 MG/DL (ref 100–199)
CO2 SERPL-SCNC: 23 MMOL/L (ref 18–29)
CREAT SERPL-MCNC: 0.67 MG/DL (ref 0.57–1)
EST. AVERAGE GLUCOSE BLD GHB EST-MCNC: 108 MG/DL
GFR SERPLBLD CREATININE-BSD FMLA CKD-EPI: 100 ML/MIN/1.73
GFR SERPLBLD CREATININE-BSD FMLA CKD-EPI: 115 ML/MIN/1.73
GLUCOSE SERPL-MCNC: 96 MG/DL (ref 65–99)
HBA1C MFR BLD: 5.4 % (ref 4.8–5.6)
HDLC SERPL-MCNC: 64 MG/DL
LDLC SERPL CALC-MCNC: 114 MG/DL (ref 0–99)
MAGNESIUM SERPL-MCNC: 1.8 MG/DL (ref 1.6–2.3)
POTASSIUM SERPL-SCNC: 4.1 MMOL/L (ref 3.5–5.2)
SODIUM SERPL-SCNC: 139 MMOL/L (ref 134–144)
TRIGL SERPL-MCNC: 105 MG/DL (ref 0–149)
TSH SERPL DL<=0.005 MIU/L-ACNC: 3.09 UIU/ML (ref 0.45–4.5)
VLDLC SERPL CALC-MCNC: 21 MG/DL (ref 5–40)

## 2018-04-11 ENCOUNTER — OFFICE VISIT (OUTPATIENT)
Dept: CARDIOLOGY CLINIC | Age: 53
End: 2018-04-11

## 2018-04-11 VITALS
WEIGHT: 158 LBS | RESPIRATION RATE: 16 BRPM | HEART RATE: 89 BPM | TEMPERATURE: 98.4 F | SYSTOLIC BLOOD PRESSURE: 132 MMHG | BODY MASS INDEX: 34.09 KG/M2 | DIASTOLIC BLOOD PRESSURE: 86 MMHG | HEIGHT: 57 IN | OXYGEN SATURATION: 97 %

## 2018-04-11 DIAGNOSIS — E78.2 MIXED HYPERLIPIDEMIA: ICD-10-CM

## 2018-04-11 DIAGNOSIS — I10 ESSENTIAL HYPERTENSION: ICD-10-CM

## 2018-04-11 DIAGNOSIS — Q21.0 VSD (VENTRICULAR SEPTAL DEFECT): Primary | ICD-10-CM

## 2018-04-11 DIAGNOSIS — R00.2 FLUTTERING SENSATION OF HEART: ICD-10-CM

## 2018-04-11 NOTE — PROGRESS NOTES
ROBYN CONTRERAS      1965   Byron Meneses MD  Date of Visit-4/11/2018     [facility],  PCP=Meron Banks MD     [facility]. HPI:   Unique Wu is a 45 y.o. female    Natasha Meyer had seen us a few years ago. She has a history of VSD or some sort of congenital  heart repair at 11. She had a cardiomegaly on chest xray. We had recommended she get an echocardiogram. She was unable to come because of transportation reasons, but has continued follow up at [place] regularly. She reports she had some fluttering that has now resolved about two weeks ago with a new medication that was provided. She has no chest pain, chest pressure, PND, orthopnea. She has no lower extremity edema. Physical Examination:  Unremarkable. Assessment/Plans:  1. VSD (ventricular septal defect)    2. Essential hypertension    3. Fluttering sensation of heart    4. Mixed hyperlipidemia        The current symptoms seem improved with the addition of medication. The question is whether now something further needs to be done in terms of follow up on an echocardiogram.  I still think that would be great, it's just hard to figure our transportation. She has no murmur. Given the long and short of it, I recommended echocardiogram, but she'll have to figure out how to get to the site of testing. Future Appointments  Date Time Provider Hossein Humphrey   5/22/2018 9:10 AM Charlie Camilo MD [facility]   10/10/2018 3:20 PM Byron Meneses MD [address]         ROS:Cardiac complete  as above. Respiratory as above with no wheezing or hemoptysis.    She denies  symptoms of unusual weight loss , fevers,  BRBPR, hematuria,  or recent stroke    Past Medical History:   Diagnosis Date    Depression 8/30/2010    GERD (gastroesophageal reflux disease) 12/30/2011    Hyperlipidemia 12/30/2011    Hypertension       Exam and Labs:  Visit Vitals    /86 (BP 1 Location: Right arm, BP Patient Position: Sitting)    Pulse 89    Temp 98.4 °F (36.9 °C) (Oral)    Resp 16    Ht 4' 9\" (1.448 m)    Wt 158 lb (71.7 kg)    SpO2 97%    BMI 34.19 kg/m2     Constitutional:  NAD, comfortable , moist mucous membranesHENT: Head: NC,ATEyes: No scleral icterus. Neck:  Neck supple. No JVD present. No tracheal deviation,mass  Chest: Effort normal & normal respiratory excursion     Lungs:breath sounds normal. No stridor. distress, wheezes or  Rales. Heart:normal rate, regular rhythm, normal S1, S2, no murmurs, rubs, clicks or gallops , PMI non displaced. Edema: Edema is none. Extremities:  no clubbing or cyanosis. Abdominal:  no abnormal distension. Neurological: alert, conversant and oriented . Skin: Skin is not cold. No obvious systemic rash noted. Not diaphoretic. No erythema. Psychiatric:  Grossly normal mood and affect.   Behavior appears normal.     Lab Results   Component Value Date/Time    Cholesterol, total 199 03/27/2018 02:44 PM    HDL Cholesterol 64 03/27/2018 02:44 PM    LDL, calculated 114 (H) 03/27/2018 02:44 PM    Triglyceride 105 03/27/2018 02:44 PM     Lab Results   Component Value Date/Time    Sodium 139 03/27/2018 02:44 PM    Potassium 4.1 03/27/2018 02:44 PM    Chloride 95 (L) 03/27/2018 02:44 PM    CO2 23 03/27/2018 02:44 PM    Anion gap 9 07/22/2009 03:42 PM    Glucose 96 03/27/2018 02:44 PM    BUN 10 03/27/2018 02:44 PM    Creatinine 0.67 03/27/2018 02:44 PM    BUN/Creatinine ratio 15 03/27/2018 02:44 PM    GFR est  03/27/2018 02:44 PM    GFR est non- 03/27/2018 02:44 PM    Calcium 9.8 03/27/2018 02:44 PM      Wt Readings from Last 3 Encounters:   04/11/18 158 lb (71.7 kg)   03/27/18 145 lb 8 oz (66 kg)   10/05/17 137 lb 9.6 oz (62.4 kg)    BP Readings from Last 3 Encounters:   04/11/18 132/86   03/27/18 145/84   10/05/17 119/78        Current Outpatient Prescriptions   Medication Sig    lovastatin (MEVACOR) 20 mg tablet Take 1 Tab by mouth nightly. Indications: myocardial infarction prevention    aspirin delayed-release 81 mg tablet Take 1 Tab by mouth daily.  lisinopril (PRINIVIL, ZESTRIL) 20 mg tablet TAKE TWO TABLETS BY MOUTH ONCE DAILY    MULTIVITAMIN WITH MINERALS (ONE-A-DAY 50 PLUS PO) Take  by mouth.  metoprolol tartrate (LOPRESSOR) 50 mg tablet Take 1 Tab by mouth two (2) times a day.  hydroCHLOROthiazide (HYDRODIURIL) 25 mg tablet Take 1 Tab by mouth daily.  cholecalciferol, vitamin D3, (VITAMIN D3) 2,000 unit tab Take  by mouth.  citalopram (CELEXA) 10 mg tablet Take 1 Tab by mouth daily.  naproxen (NAPROSYN) 500 mg tablet Take 1 Tab by mouth two (2) times daily (with meals). No current facility-administered medications for this visit. Past Surgical History:   Procedure Laterality Date    CARDIAC SURG PROCEDURE UNLIST  1974    closed hole in heart    [address]    HX TUBAL LIGATION  1995      Social Hx=  reports that she has never smoked. She has never used smokeless tobacco. She reports that she drinks alcohol. She reports that she does not use illicit drugs. Family Hx= family history includes Asthma in her daughter; Cancer in her father and maternal grandmother; Diabetes in her mother and sister; Hypertension in her mother. There is no history of Alcohol abuse, Arthritis-rheumatoid, Bleeding Prob, Elevated Lipids, Headache, Heart Disease, Lung Disease, Migraines, Psychiatric Disorder, Stroke, or Mental Retardation. Impression see above.

## 2018-04-11 NOTE — PROGRESS NOTES
1. Have you been to the ER, urgent care clinic since your last visit? Hospitalized since your last visit? No    2. Have you seen or consulted any other health care providers outside of the [facility] since your last visit? Include any pap smears or colon screening.  No  Reviewed record in preparation for visit and have necessary documentation  Pt did not bring medication to office visit for review    Goals that were addressed and/or need to be completed during or after this appointment include   Health Maintenance Due   Topic Date Due    DTaP/Tdap/Td series (1 - Tdap) 06/16/1984    PAP AKA CERVICAL CYTOLOGY  01/21/2012    BREAST CANCER SCRN MAMMOGRAM  06/16/2013    FOBT Q 1 YEAR AGE 50-75  11/18/2017

## 2018-04-11 NOTE — MR AVS SNAPSHOT
303 [facility], [place], operated by [facility] 
 
 
 [address] [facility] [address] [place] [address] 
[phone] Patient: Alfredo Carranza MRN: [id number] KBY:5[birth year]50/[birth year]09 Visit Information Date & Time Provider Department Dept. Phone Encounter #  
 4/11/2018  3:20 PM Chapis Mueller MD [facility] Ashu Ward [phone] [id number] Your Appointments 4/24/2018  9:10 AM  
ROUTINE CARE with Kasia Sparrow MD  
704 [facility] ([address]) Appt Note: 4wk f/u-Heart Palpitations [address]  
[phone]  
  
   
 [place] [id number]  
  
    
 10/10/2018  3:20 PM  
ESTABLISHED PATIENT with Chapis Mueller MD  
[facility] ([facility]) Appt Note: 6 MONTH F/U  
 [address] [place] [id number] [address]  
  
   
 [address] [place] [address] Upcoming Health Maintenance Date Due DTaP/Tdap/Td series (1 - Tdap) 6/16/1984 PAP AKA CERVICAL CYTOLOGY 1/21/2012 BREAST CANCER SCRN MAMMOGRAM 6/16/2013 FOBT Q 1 YEAR AGE 50-75 11/18/2017 Allergies as of 4/11/2018  Review Complete On: 4/11/2018 By: Mirza Brown LPN No Known Allergies Current Immunizations  Reviewed on 8/30/2010 Name Date H1N1 FLU VACCINE 1/14/2010 Influenza Vaccine (Quad) PF 10/5/2017, 11/18/2016 Not reviewed this visit Vitals BP Pulse Temp Resp Height(growth percentile) Weight(growth percentile) 132/86 (BP 1 Location: Right arm, BP Patient Position: Sitting) 89 98.4 °F (36.9 °C) (Oral) 16 4' 9\" (1.448 m) 158 lb (71.7 kg) SpO2 BMI OB Status Smoking Status 97% 34.19 kg/m2 Premenopausal Never Smoker Vitals History BMI and BSA Data Body Mass Index Body Surface Area  
 34.19 kg/m 2 1.7 m 2 Preferred Pharmacy Pharmacy Name Phone [address] [place] [address] [phone] Your Updated Medication List  
  
   
This list is accurate as of 4/11/18  3:52 PM.  Always use your most recent med list.  
  
  
  
  
 aspirin delayed-release 81 mg tablet Take 1 Tab by mouth daily. citalopram 10 mg tablet Commonly known as:  Bayron Hopes Take 1 Tab by mouth daily. hydroCHLOROthiazide 25 mg tablet Commonly known as:  HYDRODIURIL Take 1 Tab by mouth daily. lisinopril 20 mg tablet Commonly known as:  PRINIVIL, ZESTRIL  
TAKE TWO TABLETS BY MOUTH ONCE DAILY lovastatin 20 mg tablet Commonly known as:  MEVACOR Take 1 Tab by mouth nightly. Indications: myocardial infarction prevention  
  
 metoprolol tartrate 50 mg tablet Commonly known as:  LOPRESSOR Take 1 Tab by mouth two (2) times a day. naproxen 500 mg tablet Commonly known as:  NAPROSYN Take 1 Tab by mouth two (2) times daily (with meals). ONE-A-DAY 50 PLUS PO Take  by mouth. VITAMIN D3 2,000 unit Tab Generic drug:  cholecalciferol (vitamin D3) Take  by mouth. Please provide this summary of care documentation to your next provider. Your primary care clinician is listed as Jose Meraz. If you have any questions after today's visit, please call [phone].

## 2018-04-24 ENCOUNTER — OFFICE VISIT (OUTPATIENT)
Dept: FAMILY MEDICINE CLINIC | Age: 53
End: 2018-04-24

## 2018-04-24 VITALS
RESPIRATION RATE: 18 BRPM | WEIGHT: 146 LBS | BODY MASS INDEX: 31.5 KG/M2 | OXYGEN SATURATION: 97 % | HEIGHT: 57 IN | TEMPERATURE: 97.8 F | SYSTOLIC BLOOD PRESSURE: 153 MMHG | HEART RATE: 80 BPM | DIASTOLIC BLOOD PRESSURE: 87 MMHG

## 2018-04-24 DIAGNOSIS — F33.0 MILD EPISODE OF RECURRENT MAJOR DEPRESSIVE DISORDER (HCC): ICD-10-CM

## 2018-04-24 DIAGNOSIS — I10 ESSENTIAL HYPERTENSION: Primary | ICD-10-CM

## 2018-04-24 DIAGNOSIS — R00.2 HEART PALPITATIONS: ICD-10-CM

## 2018-04-24 DIAGNOSIS — Z12.11 SCREENING FOR MALIGNANT NEOPLASM OF COLON: ICD-10-CM

## 2018-04-24 DIAGNOSIS — R01.1 HEART MURMUR: ICD-10-CM

## 2018-04-24 RX ORDER — SPIRONOLACTONE 25 MG/1
25 TABLET ORAL DAILY
Qty: 30 TAB | Refills: 5 | Status: SHIPPED | OUTPATIENT
Start: 2018-04-24 | End: 2018-12-04 | Stop reason: SDUPTHER

## 2018-04-24 RX ORDER — CITALOPRAM 10 MG/1
10 TABLET ORAL DAILY
Qty: 30 TAB | Refills: 4 | Status: SHIPPED | OUTPATIENT
Start: 2018-04-24 | End: 2018-12-04 | Stop reason: SDUPTHER

## 2018-04-24 NOTE — MR AVS SNAPSHOT
303 [facility] 
 
 
 [address] [place] [address] [address] 
[phone] Patient: Lloyd Sanz MRN: [id number] BJN:[id number] Visit Information Date & Time Provider Department Dept. Phone Encounter #  
 4/24/2018  9:10 AM [id number] Follow-up Instructions Return in about 4 weeks (around 5/22/2018) for Blood pressure. .  
  
Your Appointments 4/24/2018  9:10 AM  
ROUTINE CARE with Lety Lynch MD  
704 [facility] ([address]) Appt Note: 4wk f/u-Heart Palpitations, Due for TDAP, PAP, mammo, FOBT  
 2005 A [facility] [address] [facility]   
[phone]  
  
   
 [facility] [address]  
  
    
 10/10/2018  3:20 PM  
ESTABLISHED PATIENT with Reed Westfall MD  
[facility] ([facility]) Appt Note: 6 MONTH F/U  
 [address] [place] [address] [address]  
  
   
 [address] [place] [address] Upcoming Health Maintenance Date Due DTaP/Tdap/Td series (1 - Tdap) 6/16/1984 PAP AKA CERVICAL CYTOLOGY 1/21/2012 BREAST CANCER SCRN MAMMOGRAM 6/16/2013 FOBT Q 1 YEAR AGE 50-75 11/18/2017 Allergies as of 4/24/2018  Review Complete On: 4/24/2018 By: Bosie Fleischer, LPN No Known Allergies Current Immunizations  Reviewed on 8/30/2010 Name Date H1N1 FLU VACCINE 1/14/2010 Influenza Vaccine (Quad) PF 10/5/2017, 11/18/2016 Not reviewed this visit You Were Diagnosed With   
  
 Codes Comments Essential hypertension    -  Primary ICD-10-CM: I10 
ICD-9-CM: 401.9 Screening for malignant neoplasm of colon     ICD-10-CM: Z12.11 ICD-9-CM: V76.51 Heart murmur     ICD-10-CM: R01.1 ICD-9-CM: 785.2 Heart palpitations     ICD-10-CM: R00.2 ICD-9-CM: 785.1 Vitals BP Pulse Temp Resp Height(growth percentile) Weight(growth percentile) 153/87 80 97.8 °F (36.6 °C) (Oral) 18 4' 9\" (1.448 m) 146 lb (66.2 kg) SpO2 BMI OB Status Smoking Status 97% 31.59 kg/m2 Premenopausal Never Smoker Vitals History BMI and BSA Data Body Mass Index Body Surface Area  
 31.59 kg/m 2 1.63 m 2 Preferred Pharmacy Pharmacy Name Phone Yusra Sharma [address] [place] [address] [phone] Your Updated Medication List  
  
   
This list is accurate as of 4/24/18  9:03 AM.  Always use your most recent med list.  
  
  
  
  
 aspirin delayed-release 81 mg tablet Take 1 Tab by mouth daily. citalopram 10 mg tablet Commonly known as:  Domonique Perking Take 1 Tab by mouth daily. hydroCHLOROthiazide 25 mg tablet Commonly known as:  HYDRODIURIL Take 1 Tab by mouth daily. lisinopril 20 mg tablet Commonly known as:  PRINIVIL, ZESTRIL  
TAKE TWO TABLETS BY MOUTH ONCE DAILY lovastatin 20 mg tablet Commonly known as:  MEVACOR Take 1 Tab by mouth nightly. Indications: myocardial infarction prevention  
  
 metoprolol tartrate 50 mg tablet Commonly known as:  LOPRESSOR Take 1 Tab by mouth two (2) times a day. naproxen 500 mg tablet Commonly known as:  NAPROSYN Take 1 Tab by mouth two (2) times daily (with meals). ONE-A-DAY 50 PLUS PO Take  by mouth. spironolactone 25 mg tablet Commonly known as:  ALDACTONE Take 1 Tab by mouth daily. VITAMIN D3 2,000 unit Tab Generic drug:  cholecalciferol (vitamin D3) Take  by mouth. Prescriptions Sent to Pharmacy Refills  
 spironolactone (ALDACTONE) 25 mg tablet 5 Sig: Take 1 Tab by mouth daily. Class: Normal  
 Pharmacy: [facility] [address] EARL JOHNSON [address] [place] [address] Ph #: [phone] Route: Oral  
  
We Performed the Following OCCULT BLOOD, IMMUNOASSAY (FIT) [id number] CPT(R)] Follow-up Instructions Return in about 4 weeks (around 5/22/2018) for Blood pressure. .  
  
  
Patient Instructions DASH Diet: Care Instructions Your Care Instructions The DASH diet is an eating plan that can help lower your blood pressure. DASH stands for Dietary Approaches to Stop Hypertension. Hypertension is high blood pressure. The DASH diet focuses on eating foods that are high in calcium, potassium, and magnesium. These nutrients can lower blood pressure. The foods that are highest in these nutrients are fruits, vegetables, low-fat dairy products, nuts, seeds, and legumes. But taking calcium, potassium, and magnesium supplements instead of eating foods that are high in those nutrients does not have the same effect. The DASH diet also includes whole grains, fish, and poultry. The DASH diet is one of several lifestyle changes your doctor may recommend to lower your high blood pressure. Your doctor may also want you to decrease the amount of sodium in your diet. Lowering sodium while following the DASH diet can lower blood pressure even further than just the DASH diet alone. Follow-up care is a key part of your treatment and safety. Be sure to make and go to all appointments, and call your doctor if you are having problems. It's also a good idea to know your test results and keep a list of the medicines you take. How can you care for yourself at home? Following the DASH diet · Eat 4 to 5 servings of fruit each day. A serving is 1 medium-sized piece of fruit, ½ cup chopped or canned fruit, 1/4 cup dried fruit, or 4 ounces (½ cup) of fruit juice. Choose fruit more often than fruit juice. · Eat 4 to 5 servings of vegetables each day. A serving is 1 cup of lettuce or raw leafy vegetables, ½ cup of chopped or cooked vegetables, or 4 ounces (½ cup) of vegetable juice. Choose vegetables more often than vegetable juice. · Get 2 to 3 servings of low-fat and fat-free dairy each day.  A serving is 8 ounces of milk, 1 cup of yogurt, or 1 ½ ounces of cheese. · Eat 6 to 8 servings of grains each day. A serving is 1 slice of bread, 1 ounce of dry cereal, or ½ cup of cooked rice, pasta, or cooked cereal. Try to choose whole-grain products as much as possible. · Limit lean meat, poultry, and fish to 2 servings each day. A serving is 3 ounces, about the size of a deck of cards. · Eat 4 to 5 servings of nuts, seeds, and legumes (cooked dried beans, lentils, and split peas) each week. A serving is 1/3 cup of nuts, 2 tablespoons of seeds, or ½ cup of cooked beans or peas. · Limit fats and oils to 2 to 3 servings each day. A serving is 1 teaspoon of vegetable oil or 2 tablespoons of salad dressing. · Limit sweets and added sugars to 5 servings or less a week. A serving is 1 tablespoon jelly or jam, ½ cup sorbet, or 1 cup of lemonade. · Eat less than 2,300 milligrams (mg) of sodium a day. If you limit your sodium to 1,500 mg a day, you can lower your blood pressure even more. Tips for success · Start small. Do not try to make dramatic changes to your diet all at once. You might feel that you are missing out on your favorite foods and then be more likely to not follow the plan. Make small changes, and stick with them. Once those changes become habit, add a few more changes. · Try some of the following: ¨ Make it a goal to eat a fruit or vegetable at every meal and at snacks. This will make it easy to get the recommended amount of fruits and vegetables each day. ¨ Try yogurt topped with fruit and nuts for a snack or healthy dessert. ¨ Add lettuce, tomato, cucumber, and onion to sandwiches. ¨ Combine a ready-made pizza crust with low-fat mozzarella cheese and lots of vegetable toppings. Try using tomatoes, squash, spinach, broccoli, carrots, cauliflower, and onions. ¨ Have a variety of cut-up vegetables with a low-fat dip as an appetizer instead of chips and dip. ¨ Sprinkle sunflower seeds or chopped almonds over salads. Or try adding chopped walnuts or almonds to cooked vegetables. ¨ Try some vegetarian meals using beans and peas. Add garbanzo or kidney beans to salads. Make burritos and tacos with mashed valdivia beans or black beans. Where can you learn more? Go to http://braden-soto.info/. Enter L266 in the search box to learn more about \"DASH Diet: Care Instructions. \" Current as of: September 21, 2016 Content Version: 11.4 © 7992-4768 [employer]. Care instructions adapted under license by [employer] (which disclaims liability or warranty for this information). If you have questions about a medical condition or this instruction, always ask your healthcare professional. [address] any warranty or liability for your use of this information. Please provide this summary of care documentation to your next provider. Your primary care clinician is listed as Talha Murry. If you have any questions after today's visit, please call [phone].

## 2018-04-24 NOTE — PROGRESS NOTES
1. Have you been to the ER, urgent care clinic, or been hospitalized since your last visit? No     2. Have you seen or consulted any other health care providers outside of the [address] [place] [address] since your last visit? No       Reviewed record in preparation for visit and have necessary documentation  opportunity was given for questions  Goals that were addressed and/or need to be completed during or after this appointment include     Health Maintenance Due   Topic Date Due    DTaP/Tdap/Td series (1 - Tdap) 06/16/1984    PAP AKA CERVICAL CYTOLOGY  01/21/2012    BREAST CANCER SCRN MAMMOGRAM  06/16/2013    FOBT Q 1 YEAR AGE 50-75  11/18/2017     Declines health maintenance at this time.

## 2018-04-24 NOTE — PROGRESS NOTES
Rocky Villegas  45 y.o. female  1965  [address]  [phone]     [facility]: Progress Note       Encounter Date: 4/24/2018    Chief Complaint   Patient presents with    Hypertension    Palpitations     follow up       History provided by patient  History of Present Illness   Rocky Villegas is a 45 y.o. female who presents to clinic today for:    Hypertension: Uncontrolled   BP Readings from Last 3 Encounters:   04/24/18 153/87   04/11/18 132/86   03/27/18 145/84     The patient reports:  taking medications as instructed, no medication side effects noted, no TIA's, no chest pain on exertion, no dyspnea on exertion, no swelling of ankles. Sodium   Date Value Ref Range Status   03/27/2018 139 134 - 144 mmol/L Final     Potassium   Date Value Ref Range Status   03/27/2018 4.1 3.5 - 5.2 mmol/L Final     Magnesium   Date Value Ref Range Status   03/27/2018 1.8 1.6 - 2.3 mg/dL Final     Chloride   Date Value Ref Range Status   03/27/2018 95 (L) 96 - 106 mmol/L Final     Creatinine   Date Value Ref Range Status   03/27/2018 0.67 0.57 - 1.00 mg/dL Final   10/05/2017 0.90 0.57 - 1.00 mg/dL Final   03/23/2017 0.80 0.57 - 1.00 mg/dL Final     GFR est AA   Date Value Ref Range Status   03/27/2018 115 >59 mL/min/1.73 Final   10/05/2017 84 >59 mL/min/1.73 Final   03/23/2017 97 >59 mL/min/1.73 Final     GFR est non-AA   Date Value Ref Range Status   03/27/2018 100 >59 mL/min/1.73 Final   10/05/2017 73 >59 mL/min/1.73 Final   03/23/2017 84 >59 mL/min/1.73 Final       Our goal is to normalize the blood pressure to decrease the risks of strokes and heart attacks. The patient is in agreement with the plan. Depression Review:  Patient is seen for anxiety disorder/depression. Current treatment includes Celexa (taking only PRN) and no other therapies. Ongoig symptoms include: psychomotor agitation palpitations, racing thoughts.  irregularly  Patient denies: anhedonia, decreased sleep and suicidal ideation homocidal ideation. Reported side effects from the treatment: none       Health Maintenance    Health Maintenance Due   Topic Date Due    PAP AKA CERVICAL CYTOLOGY  01/21/2012    FOBT Q 1 YEAR AGE 50-75  11/18/2017     Review of Systems   Review of Systems   Constitutional: Negative for chills, diaphoresis, malaise/fatigue and weight loss. Respiratory: Negative for cough, shortness of breath and wheezing. Cardiovascular: Positive for palpitations. Negative for chest pain, orthopnea, claudication and leg swelling. Gastrointestinal: Negative for abdominal pain, constipation, diarrhea, nausea and vomiting. Genitourinary: Negative for dysuria, frequency, hematuria and urgency. Skin: Negative for rash. Neurological: Negative for dizziness, weakness and headaches. Vitals/Objective:     Vitals:    04/24/18 0851 04/24/18 0854   BP: (!) 149/92 153/87   Pulse: 80 80   Resp: 18    Temp: 97.8 °F (36.6 °C)    TempSrc: Oral    SpO2: 97%    Weight: 146 lb (66.2 kg)    Height: 4' 9\" (1.448 m)      Body mass index is 31.59 kg/(m^2). Wt Readings from Last 3 Encounters:   04/24/18 146 lb (66.2 kg)   04/11/18 158 lb (71.7 kg)   03/27/18 145 lb 8 oz (66 kg)       Physical Exam   Constitutional: She is oriented to person, place, and time. She appears well-developed and well-nourished. HENT:   Head: Normocephalic and atraumatic. Mouth/Throat: No oropharyngeal exudate. Eyes: Conjunctivae are normal. Pupils are equal, round, and reactive to light. Neck: Normal range of motion. Neck supple. Cardiovascular: Normal rate. Murmur heard. Systolic murmur is present with a grade of 2/6   Pulmonary/Chest: Effort normal and breath sounds normal. No respiratory distress. She has no wheezes. She has no rales. Musculoskeletal: She exhibits no edema or tenderness. Lymphadenopathy:     She has no cervical adenopathy.    Neurological: She is alert and oriented to person, place, and time. No results found for this or any previous visit (from the past 24 hour(s)). Assessment and Plan:     Encounter Diagnoses     ICD-10-CM ICD-9-CM   1. Essential hypertension I10 401.9   2. Mild episode of recurrent major depressive disorder (HCC) F33.0 296.31   3. Heart murmur R01.1 785.2   4. Heart palpitations R00.2 785.1   5. Screening for malignant neoplasm of colon Z12.11 V76.51       1. Essential hypertension  Will add spironolactone. As patient is on max doses of ACEi and HCTZ. - spironolactone (ALDACTONE) 25 mg tablet; Take 1 Tab by mouth daily. Dispense: 30 Tab; Refill: 5    2. Mild episode of recurrent major depressive disorder [facility])  Advised patient that medication is meant to be daily. Asked if she would like to stop as she has not needed it often and she declined. Reported she would take medication daily. - citalopram (CELEXA) 10 mg tablet; Take 1 Tab by mouth daily. Dispense: 30 Tab; Refill: 4    3. Heart murmur  4. Heart palpitations  Patient has been to see cardiologist. She is still unable to get to site for transportation. She is attempting to set up transportation. If she can, she will contact the visit. 5. Screening for malignant neoplasm of colon  - OCCULT BLOOD, IMMUNOASSAY (FIT)    I have discussed the diagnosis with the patient and the intended plan as seen in the above orders. she has expressed understanding. The patient has received an after-visit summary and questions were answered concerning future plans. I have discussed medication side effects and warnings with the patient as well. Electronically Signed: Jose Meraz MD     History/Allergies   Patients past medical, surgical and family histories were reviewed and updated.     Past Medical History:   Diagnosis Date    Depression 8/30/2010    GERD (gastroesophageal reflux disease) 12/30/2011    Hyperlipidemia 12/30/2011    Hypertension       Past Surgical History:   Procedure Laterality Date  CARDIAC SURG PROCEDURE UNLIST  1974    closed hole in heart    HX HERNIA REPAIR  1983    HX TUBAL LIGATION  1995     Family History   Problem Relation Age of Onset    Diabetes Mother     Hypertension Mother     Cancer Maternal Grandmother     Asthma Daughter     Cancer Father      lung cancer    Diabetes Sister     Alcohol abuse Neg Hx     Arthritis-rheumatoid Neg Hx     Bleeding Prob Neg Hx     Elevated Lipids Neg Hx     Headache Neg Hx     Heart Disease Neg Hx     Lung Disease Neg Hx     Migraines Neg Hx     Psychiatric Disorder Neg Hx     Stroke Neg Hx     Mental Retardation Neg Hx      Social History     Social History    Marital status: SINGLE     Spouse name: N/A    Number of children: N/A    Years of education: N/A     Occupational History    Not on file. Social History Main Topics    Smoking status: Never Smoker    Smokeless tobacco: Never Used    Alcohol use Yes      Comment: 2 beers every other day    Drug use: No    Sexual activity: Yes     Other Topics Concern    Not on file     Social History Narrative         No Known Allergies    Disposition     Follow-up Disposition:  Return in about 4 weeks (around 5/22/2018) for Blood pressure. .    Future Appointments  Date Time Provider Hossein Humphrey   5/22/2018 9:10 AM Kiley Ragsdale MD [facility] SCHED   10/10/2018 3:20 PM Fatou Flor MD [address]            Current Medications after this visit     Current Outpatient Prescriptions   Medication Sig    spironolactone (ALDACTONE) 25 mg tablet Take 1 Tab by mouth daily.  citalopram (CELEXA) 10 mg tablet Take 1 Tab by mouth daily.  lovastatin (MEVACOR) 20 mg tablet Take 1 Tab by mouth nightly. Indications: myocardial infarction prevention    aspirin delayed-release 81 mg tablet Take 1 Tab by mouth daily.     lisinopril (PRINIVIL, ZESTRIL) 20 mg tablet TAKE TWO TABLETS BY MOUTH ONCE DAILY    MULTIVITAMIN WITH MINERALS (ONE-A-DAY 50 PLUS PO) Take by mouth.  metoprolol tartrate (LOPRESSOR) 50 mg tablet Take 1 Tab by mouth two (2) times a day.  hydroCHLOROthiazide (HYDRODIURIL) 25 mg tablet Take 1 Tab by mouth daily.  cholecalciferol, vitamin D3, (VITAMIN D3) 2,000 unit tab Take  by mouth.  naproxen (NAPROSYN) 500 mg tablet Take 1 Tab by mouth two (2) times daily (with meals). No current facility-administered medications for this visit.       Medications Discontinued During This Encounter   Medication Reason    citalopram (CELEXA) 10 mg tablet Reorder

## 2018-04-24 NOTE — PATIENT INSTRUCTIONS

## 2018-05-22 ENCOUNTER — OFFICE VISIT (OUTPATIENT)
Dept: FAMILY MEDICINE CLINIC | Age: 53
End: 2018-05-22

## 2018-05-22 VITALS
HEART RATE: 67 BPM | SYSTOLIC BLOOD PRESSURE: 131 MMHG | DIASTOLIC BLOOD PRESSURE: 75 MMHG | WEIGHT: 144 LBS | RESPIRATION RATE: 18 BRPM | BODY MASS INDEX: 31.07 KG/M2 | OXYGEN SATURATION: 97 % | TEMPERATURE: 98.5 F | HEIGHT: 57 IN

## 2018-05-22 DIAGNOSIS — I10 ESSENTIAL HYPERTENSION: Primary | ICD-10-CM

## 2018-05-22 DIAGNOSIS — Q21.0 VSD (VENTRICULAR SEPTAL DEFECT): ICD-10-CM

## 2018-05-22 DIAGNOSIS — F33.42 RECURRENT MAJOR DEPRESSIVE DISORDER, IN FULL REMISSION (HCC): ICD-10-CM

## 2018-05-22 NOTE — MR AVS SNAPSHOT
[address] [facility] 
 
 
 [address] [facility] [address] [place] [address] 
[phone] Patient: Bessie Vernon MRN: [id number] ILR:8/30/[id number] Visit Information Date & Time Provider Department Dept. Phone Encounter #  
 5/22/2018  9:10 AM Kimani Kelly MD [address] [id number] Follow-up Instructions Return in about 3 months (around 8/22/2018) for routine. ASAP for well woman with PAP. Benna Him Your Appointments 10/10/2018  3:20 PM  
ESTABLISHED PATIENT with Ángela Navarro MD  
[facility] ([facility]UL[facility]) Appt Note: 6 MONTH F/U  
 [address] [facility] [address] [facility] [address]  
  
   
 [address] [place] [address] Upcoming Health Maintenance Date Due  
 PAP AKA CERVICAL CYTOLOGY 1/21/2012 FOBT Q 1 YEAR AGE 50-75 11/18/2017 BREAST CANCER SCRN MAMMOGRAM 6/23/2018* DTaP/Tdap/Td series (1 - Tdap) 10/21/2018* Influenza Age 5 to Adult 8/1/2018 *Topic was postponed. The date shown is not the original due date. Allergies as of 5/22/2018  Review Complete On: 5/22/2018 By: Yara Kirk LPN No Known Allergies Current Immunizations  Reviewed on 8/30/2010 Name Date H1N1 FLU VACCINE 1/14/2010 Influenza Vaccine (Quad) PF 10/5/2017, 11/18/2016 Not reviewed this visit You Were Diagnosed With   
  
 Codes Comments Essential hypertension    -  Primary ICD-10-CM: I10 
ICD-9-CM: 401.9 Vitals BP Pulse Temp Resp Height(growth percentile) Weight(growth percentile) 131/75 67 98.5 °F (36.9 °C) (Oral) 18 4' 9\" (1.448 m) 144 lb (65.3 kg) SpO2 BMI OB Status Smoking Status 97% 31.16 kg/m2 Premenopausal Never Smoker Vitals History BMI and BSA Data Body Mass Index Body Surface Area  
 31.16 kg/m 2 1.62 m 2 Preferred Pharmacy Pharmacy Name Phone [address] [address] [phone] Your Updated Medication List  
  
   
This list is accurate as of 5/22/18  9:36 AM.  Always use your most recent med list.  
  
  
  
  
 aspirin delayed-release 81 mg tablet Take 1 Tab by mouth daily. citalopram 10 mg tablet Commonly known as:  Yung Judith Take 1 Tab by mouth daily. hydroCHLOROthiazide 25 mg tablet Commonly known as:  HYDRODIURIL Take 1 Tab by mouth daily. lisinopril 20 mg tablet Commonly known as:  PRINIVIL, ZESTRIL  
TAKE TWO TABLETS BY MOUTH ONCE DAILY lovastatin 20 mg tablet Commonly known as:  MEVACOR Take 1 Tab by mouth nightly. Indications: myocardial infarction prevention  
  
 metoprolol tartrate 50 mg tablet Commonly known as:  LOPRESSOR Take 1 Tab by mouth two (2) times a day. naproxen 500 mg tablet Commonly known as:  NAPROSYN Take 1 Tab by mouth two (2) times daily (with meals). ONE-A-DAY 50 PLUS PO Take  by mouth. spironolactone 25 mg tablet Commonly known as:  ALDACTONE Take 1 Tab by mouth daily. VITAMIN D3 2,000 unit Tab Generic drug:  cholecalciferol (vitamin D3) Take  by mouth. Follow-up Instructions Return in about 3 months (around 8/22/2018) for routine. ASAP for well woman with PAP. Lili Magallon Please provide this summary of care documentation to your next provider. Your primary care clinician is listed as Roxanne Baez. If you have any questions after today's visit, please call [phone].

## 2018-05-22 NOTE — PROGRESS NOTES
Brittanie Marion  45 y.o. female  1965  [address]  [id number]     [facility]: Progress Note       Encounter Date: 5/22/2018    Chief Complaint   Patient presents with    Hypertension       History provided by patient  History of Present Illness   Brittanie Marion is a 45 y.o. female who presents to clinic today for:    Hypertension: Improved   BP Readings from Last 3 Encounters:   05/22/18 131/75   04/24/18 153/87   04/11/18 132/86     The patient reports:  taking medications as instructed, no medication side effects noted, no TIA's, no chest pain on exertion, no dyspnea on exertion, no swelling of ankles. Sodium   Date Value Ref Range Status   03/27/2018 139 134 - 144 mmol/L Final     Potassium   Date Value Ref Range Status   03/27/2018 4.1 3.5 - 5.2 mmol/L Final     Magnesium   Date Value Ref Range Status   03/27/2018 1.8 1.6 - 2.3 mg/dL Final     Chloride   Date Value Ref Range Status   03/27/2018 95 (L) 96 - 106 mmol/L Final     Creatinine   Date Value Ref Range Status   03/27/2018 0.67 0.57 - 1.00 mg/dL Final   10/05/2017 0.90 0.57 - 1.00 mg/dL Final   03/23/2017 0.80 0.57 - 1.00 mg/dL Final     GFR est AA   Date Value Ref Range Status   03/27/2018 115 >59 mL/min/1.73 Final   10/05/2017 84 >59 mL/min/1.73 Final   03/23/2017 97 >59 mL/min/1.73 Final     GFR est non-AA   Date Value Ref Range Status   03/27/2018 100 >59 mL/min/1.73 Final   10/05/2017 73 >59 mL/min/1.73 Final   03/23/2017 84 >59 mL/min/1.73 Final       Our goal is to normalize the blood pressure to decrease the risks of strokes and heart attacks. The patient is in agreement with the plan. Depression Review:  Patient is seen for depression. Current treatment includes Celexa and no other therapies. Ongoig symptoms include:  none.   Patient denies: anhedonia, decreased sleep, depressed mood, inappropriate guilt, increased sleep, psychomotor agitation, psychomotor retardation and suicidal ideation homocidal ideation. Reported side effects from the treatment: none          Health Maintenance  Patient asked to schedule appointment . Health Maintenance Due   Topic Date Due    PAP AKA CERVICAL CYTOLOGY  01/21/2012    FOBT Q 1 YEAR AGE 50-75  11/18/2017     Review of Systems   Review of Systems   Constitutional: Negative for chills and fever. HENT: Positive for congestion. Eyes: Positive for discharge and redness. Respiratory: Negative for cough and shortness of breath. Cardiovascular: Negative for chest pain, palpitations and leg swelling. Gastrointestinal: Negative for abdominal pain, constipation, diarrhea, nausea and vomiting. Skin: Negative for itching and rash. Neurological: Negative for dizziness, sensory change, speech change, focal weakness and headaches. Psychiatric/Behavioral: Negative for depression, hallucinations and suicidal ideas. The patient is not nervous/anxious and does not have insomnia. Vitals/Objective:     Vitals:    05/22/18 0901 05/22/18 0903   BP: (!) 148/92 131/75   Pulse: 69 67   Resp: 18    Temp: 98.5 °F (36.9 °C)    TempSrc: Oral    SpO2: 97%    Weight: 144 lb (65.3 kg)    Height: 4' 9\" (1.448 m)      Body mass index is 31.16 kg/(m^2). Wt Readings from Last 3 Encounters:   05/22/18 144 lb (65.3 kg)   04/24/18 146 lb (66.2 kg)   04/11/18 158 lb (71.7 kg)       Physical Exam   Constitutional: She is oriented to person, place, and time. She appears well-developed and well-nourished. HENT:   Head: Normocephalic and atraumatic. Mouth/Throat: No oropharyngeal exudate. Eyes: Conjunctivae are normal. Pupils are equal, round, and reactive to light. Neck: Normal range of motion. Neck supple. Cardiovascular: Normal rate. Murmur heard. Systolic murmur is present with a grade of 2/6   Pulmonary/Chest: Effort normal and breath sounds normal. No respiratory distress. She has no wheezes. She has no rales.    Musculoskeletal: She exhibits no edema or tenderness. Lymphadenopathy:     She has no cervical adenopathy. Neurological: She is alert and oriented to person, place, and time. No results found for this or any previous visit (from the past 24 hour(s)). Assessment and Plan:     Encounter Diagnoses     ICD-10-CM ICD-9-CM   1. Essential hypertension I10 401.9   2. VSD (ventricular septal defect) Q21.0 745.4   3. Recurrent major depressive disorder, in full remission ([facility] [address] F33.42 296.36       1. Essential hypertension  Improved continue current medications. 2. VSD (ventricular septal defect)  Patient is still trying to find transport in order to get ECHO done. 3. Recurrent major depressive disorder, in full remission ([facility] [address]  Stable. I have discussed the diagnosis with the patient and the intended plan as seen in the above orders. she has expressed understanding. The patient has received an after-visit summary and questions were answered concerning future plans. I have discussed medication side effects and warnings with the patient as well. Electronically Signed: Kathy Reyez MD     History/Allergies   Patients past medical, surgical and family histories were reviewed and updated.     Past Medical History:   Diagnosis Date    Depression 8/30/2010    GERD (gastroesophageal reflux disease) 12/30/2011    Hyperlipidemia 12/30/2011    Hypertension       Past Surgical History:   Procedure Laterality Date    CARDIAC SURG PROCEDURE UNLIST  1974    closed hole in heart    HX HERNIA REPAIR  1983    HX TUBAL LIGATION  1995     Family History   Problem Relation Age of Onset    Diabetes Mother     Hypertension Mother     Cancer Maternal Grandmother     Asthma Daughter     Cancer Father      lung cancer    Diabetes Sister     Alcohol abuse Neg Hx     Arthritis-rheumatoid Neg Hx     Bleeding Prob Neg Hx     Elevated Lipids Neg Hx     Headache Neg Hx     Heart Disease Neg Hx     Lung Disease Neg Hx     Migraines Neg Hx     Psychiatric Disorder Neg Hx     Stroke Neg Hx     Mental Retardation Neg Hx      Social History     Social History    Marital status: SINGLE     Spouse name: N/A    Number of children: N/A    Years of education: N/A     Occupational History    Not on file. Social History Main Topics    Smoking status: Never Smoker    Smokeless tobacco: Never Used    Alcohol use Yes      Comment: 2 beers every other day    Drug use: No    Sexual activity: Yes     Other Topics Concern    Not on file     Social History Narrative         No Known Allergies    Disposition     Follow-up Disposition:  Return in about 3 months (around 8/22/2018) for routine. ASAP for well woman with PAP. Vilma Fierro Future Appointments  Date Time Provider Hossein Humphrey   10/10/2018 3:20 PM MD ALDO KrugerENA SCHED            Current Medications after this visit     Current Outpatient Prescriptions   Medication Sig    spironolactone (ALDACTONE) 25 mg tablet Take 1 Tab by mouth daily.  citalopram (CELEXA) 10 mg tablet Take 1 Tab by mouth daily.  lovastatin (MEVACOR) 20 mg tablet Take 1 Tab by mouth nightly. Indications: myocardial infarction prevention    aspirin delayed-release 81 mg tablet Take 1 Tab by mouth daily.  lisinopril (PRINIVIL, ZESTRIL) 20 mg tablet TAKE TWO TABLETS BY MOUTH ONCE DAILY    MULTIVITAMIN WITH MINERALS (ONE-A-DAY 50 PLUS PO) Take  by mouth.  metoprolol tartrate (LOPRESSOR) 50 mg tablet Take 1 Tab by mouth two (2) times a day.  hydroCHLOROthiazide (HYDRODIURIL) 25 mg tablet Take 1 Tab by mouth daily.  cholecalciferol, vitamin D3, (VITAMIN D3) 2,000 unit tab Take  by mouth.  naproxen (NAPROSYN) 500 mg tablet Take 1 Tab by mouth two (2) times daily (with meals). No current facility-administered medications for this visit. There are no discontinued medications.

## 2018-05-22 NOTE — PROGRESS NOTES
1. Have you been to the ER, urgent care clinic, or been hospitalized since your last visit? No     2. Have you seen or consulted any other health care providers outside of the [address] [place] [address] since your last visit?   No     Reviewed record in preparation for visit and have necessary documentation  opportunity was given for questions  Goals that were addressed and/or need to be completed during or after this appointment include     Health Maintenance Due   Topic Date Due    PAP AKA CERVICAL CYTOLOGY  01/21/2012    FOBT Q 1 YEAR AGE 50-75  11/18/2017

## 2018-06-18 DIAGNOSIS — I10 ESSENTIAL HYPERTENSION WITH GOAL BLOOD PRESSURE LESS THAN 140/90: ICD-10-CM

## 2018-06-18 RX ORDER — HYDROCHLOROTHIAZIDE 25 MG/1
TABLET ORAL
Qty: 90 TAB | Refills: 1 | Status: SHIPPED | OUTPATIENT
Start: 2018-06-18 | End: 2018-08-13 | Stop reason: SDUPTHER

## 2018-07-05 LAB — HEMOCCULT STL QL IA: NORMAL

## 2018-08-11 DIAGNOSIS — I10 ESSENTIAL HYPERTENSION WITH GOAL BLOOD PRESSURE LESS THAN 140/90: ICD-10-CM

## 2018-08-13 RX ORDER — HYDROCHLOROTHIAZIDE 25 MG/1
TABLET ORAL
Qty: 90 TAB | Refills: 1 | Status: SHIPPED | OUTPATIENT
Start: 2018-08-13 | End: 2019-06-28 | Stop reason: SDUPTHER

## 2018-08-13 RX ORDER — METOPROLOL TARTRATE 50 MG/1
TABLET ORAL
Qty: 180 TAB | Refills: 1 | Status: SHIPPED | OUTPATIENT
Start: 2018-08-13 | End: 2019-08-07 | Stop reason: SDUPTHER

## 2018-10-04 ENCOUNTER — OFFICE VISIT (OUTPATIENT)
Dept: FAMILY MEDICINE CLINIC | Age: 53
End: 2018-10-04

## 2018-10-04 VITALS
TEMPERATURE: 98.3 F | SYSTOLIC BLOOD PRESSURE: 137 MMHG | WEIGHT: 142 LBS | RESPIRATION RATE: 18 BRPM | BODY MASS INDEX: 30.63 KG/M2 | OXYGEN SATURATION: 95 % | HEART RATE: 70 BPM | HEIGHT: 57 IN | DIASTOLIC BLOOD PRESSURE: 85 MMHG

## 2018-10-04 DIAGNOSIS — F41.9 ANXIETY: ICD-10-CM

## 2018-10-04 DIAGNOSIS — I10 ESSENTIAL HYPERTENSION WITH GOAL BLOOD PRESSURE LESS THAN 140/90: Primary | ICD-10-CM

## 2018-10-04 DIAGNOSIS — E55.9 VITAMIN D DEFICIENCY: ICD-10-CM

## 2018-10-04 DIAGNOSIS — Z23 ENCOUNTER FOR IMMUNIZATION: ICD-10-CM

## 2018-10-04 RX ORDER — ASPIRIN 81 MG/1
81 TABLET ORAL DAILY
Qty: 90 TAB | Refills: 1 | Status: SHIPPED | OUTPATIENT
Start: 2018-10-04 | End: 2019-07-31 | Stop reason: SDUPTHER

## 2018-10-04 RX ORDER — NAPROXEN 500 MG/1
500 TABLET ORAL 2 TIMES DAILY WITH MEALS
Qty: 20 TAB | Refills: 1 | Status: SHIPPED | OUTPATIENT
Start: 2018-10-04 | End: 2019-09-12 | Stop reason: SDUPTHER

## 2018-10-04 RX ORDER — LISINOPRIL 20 MG/1
20 TABLET ORAL DAILY
Qty: 90 TAB | Refills: 1 | Status: SHIPPED | OUTPATIENT
Start: 2018-10-04 | End: 2018-12-04

## 2018-10-04 NOTE — PROGRESS NOTES
1. Have you been to the ER, urgent care clinic, or been hospitalized since your last visit? No  
 
2. Have you seen or consulted any other health care providers outside of the [address] [place] [address] since your last visit? No  
 
Reviewed record in preparation for visit and have necessary documentation Opportunity was given for questions Goals that were addressed and/or need to be completed during or after this appointment include Health Maintenance Due Topic Date Due  
 PAP AKA CERVICAL CYTOLOGY  01/21/2012  Shingrix Vaccine Age 50> (1 of 2) 06/16/2013  BREAST CANCER SCRN MAMMOGRAM  06/16/2013  Influenza Age 5 to Adult  08/01/2018

## 2018-10-04 NOTE — PROGRESS NOTES
Kimberly Lamb 52 y.o. female 1965 
[address] [facility] 
[id number] [facility] Practice: Progress Note Encounter Date: 10/4/2018 Chief Complaint Patient presents with  Hypertension  Cold Symptoms  
  sore throat, cough, congestion (for 2 weeks but getting better now)  Immunization/Injection History provided by patient History of Present Illness Kimberly Lamb is a 52 y.o. female who presents to clinic today for: Hypertension: Controlled BP Readings from Last 3 Encounters:  
10/04/18 137/85  
05/22/18 131/75  
04/24/18 153/87 The patient reports:  taking medications as instructed, no medication side effects noted, no TIA's, no chest pain on exertion, no dyspnea on exertion, no swelling of ankles. Home monitoring:No 
Sodium Date Value Ref Range Status 03/27/2018 139 134 - 144 mmol/L Final  
 
Potassium Date Value Ref Range Status 03/27/2018 4.1 3.5 - 5.2 mmol/L Final  
 
Magnesium Date Value Ref Range Status 03/27/2018 1.8 1.6 - 2.3 mg/dL Final  
 
Chloride Date Value Ref Range Status 03/27/2018 95 (L) 96 - 106 mmol/L Final  
 
Creatinine Date Value Ref Range Status 03/27/2018 0.67 0.57 - 1.00 mg/dL Final  
10/05/2017 0.90 0.57 - 1.00 mg/dL Final  
03/23/2017 0.80 0.57 - 1.00 mg/dL Final  
 
 
Our goal is to normalize the blood pressure to decrease the risks of strokes and heart attacks. The patient is in agreement with the plan. Anxiety Review: 
Patient is seen for anxiety disorder. Current treatment includes Celexa and no other therapies. Patient is currently taking medication every other day Ongoig symptoms include:  feelings of losing control, difficulty concentrating. Patient denies: anhedonia, appetite loss, depressed mood, psychomotor agitation and psychomotor retardation homocidal ideation. Reported side effects from the treatment: none Vitamin D Deficiency:Uncontrolled Patient is currently taking Vitamin D3 1,000 units. Report that she is feeling well on current supplementation. VITAMIN D, 25-HYDROXY Date Value Ref Range Status 03/27/2018 24.7 (L) 30.0 - 100.0 ng/mL Final  
  Comment:  
  Vitamin D deficiency has been defined by the [facility] practice guideline as a 
level of serum 25-OH vitamin D less than 20 ng/mL (1,2). The Endocrine Society went on to further define vitamin D 
insufficiency as a level between 21 and 29 ng/mL (2). 1. IOM ([place] of [employer]). 2010. Dietary reference 
   intakes for calcium and D. [address] [place] [address]: The 
   [employer]. 2. Brandin Munguia, Sabiha HAWLEY, et al. 
   Evaluation, treatment, and prevention of vitamin D 
   deficiency: an Endocrine Society clinical practice 
   guideline. JCEM. 2011 Jul; 96(7):1911-30. 
  
11/18/2016 18.2 (L) 30.0 - 100.0 ng/mL Final  
  Comment:  
  Vitamin D deficiency has been defined by the [facility] practice guideline as a 
level of serum 25-OH vitamin D less than 20 ng/mL (1,2). The Endocrine Society went on to further define vitamin D 
insufficiency as a level between 21 and 29 ng/mL (2). 1. IOM ([place] [employer]). 2010. Dietary reference 
   intakes for calcium and D. [address] [place] [address]: The 
   [employer]. 2. Brandin Munguia, Sabiha HAWLEY, et al. 
   Evaluation, treatment, and prevention of vitamin D 
   deficiency: an Endocrine Society clinical practice 
   guideline. JCEM. 2011 Jul; 96(7):1911-30. 
  
11/18/2015 21.3 (L) 30.0 - 100.0 ng/mL Final  
  Comment:  
  Vitamin D deficiency has been defined by the [facility] practice guideline as a 
level of serum 25-OH vitamin D less than 20 ng/mL (1,2). The Endocrine Society went on to further define vitamin D 
insufficiency as a level between 21 and 29 ng/mL (2). 1. IOM ([place] of Medicine). 2010. Dietary reference 
   intakes for calcium and D. [facility]: The 
   [employer]. 2. Chidi MF, Brandin NC, Sabiha HAWLEY, et al. 
   Evaluation, treatment, and prevention of vitamin D 
   deficiency: an Endocrine Society clinical practice 
   guideline. JCEM. 2011 Jul; 96(7):1911-30. Health Maintenance Discussed with patient. Health Maintenance Due Topic Date Due  
 PAP AKA CERVICAL CYTOLOGY  01/21/2012  Shingrix Vaccine Age 50> (1 of 2) 06/16/2013  BREAST CANCER SCRN MAMMOGRAM  06/16/2013  Influenza Age 5 to Adult  08/01/2018 Review of Systems Review of Systems Constitutional: Negative for chills and fever. Respiratory: Negative for cough and hemoptysis. Cardiovascular: Negative for chest pain, palpitations and leg swelling. Gastrointestinal: Negative for abdominal pain, constipation, diarrhea, nausea and vomiting. Skin: Negative for itching and rash. Neurological: Negative for dizziness and headaches. Vitals/Objective:  
 
Vitals:  
 10/04/18 7458 BP: 137/85 Pulse: 70 Resp: 18 Temp: 98.3 °F (36.8 °C) TempSrc: Oral  
SpO2: 95% Weight: 142 lb (64.4 kg) Height: 4' 9\" (1.448 m) Body mass index is 30.73 kg/(m^2). Wt Readings from Last 3 Encounters:  
10/04/18 142 lb (64.4 kg) 05/22/18 144 lb (65.3 kg) 04/24/18 146 lb (66.2 kg) Physical Exam  
Constitutional: She is oriented to person, place, and time. She appears well-developed and well-nourished. HENT:  
Head: Normocephalic and atraumatic. Mouth/Throat: No oropharyngeal exudate. Eyes: Conjunctivae are normal. Pupils are equal, round, and reactive to light. Neck: Normal range of motion. Neck supple. Cardiovascular: Normal rate. Murmur heard. Systolic murmur is present with a grade of 2/6 Pulmonary/Chest: Effort normal and breath sounds normal. No respiratory distress. She has no wheezes. She has no rales. Musculoskeletal: She exhibits no edema or tenderness. Lymphadenopathy:  
  She has no cervical adenopathy. Neurological: She is alert and oriented to person, place, and time. No results found for this or any previous visit (from the past 24 hour(s)). Assessment and Plan:  
 
Encounter Diagnoses ICD-10-CM ICD-9-CM 1. Essential hypertension with goal blood pressure less than 140/90 I10 401.9 2. Vitamin D deficiency E55.9 268.9 3. Anxiety F41.9 300.00  
4. Encounter for immunization Z23 V03.89 1. Essential hypertension with goal blood pressure less than 140/90 Well controlled. Refill medication and check BMP. - lisinopril (PRINIVIL, ZESTRIL) 20 mg tablet; Take 1 Tab by mouth daily. Dispense: 90 Tab; Refill: 1 
- aspirin delayed-release 81 mg tablet; Take 1 Tab by mouth daily. Dispense: 90 Tab; Refill: 1 
- METABOLIC PANEL, BASIC 
- LIPID PANEL 2. Vitamin D deficiency On supplement. - VITAMIN D, 25 HYDROXY 3. Anxiety Advised patient that celexa is supposed to be taken daily and asked if she would prefer to stop medication. However she reports that if her mood worsens she feels much improved by then increasing medication to daily. 4. Encounter for immunization - NE IMMUNIZ ADMIN,1 SINGLE/COMB VAC/TOXOID 
- INFLUENZA VIRUS VACCINE QUADRIVALENT, PRESERVATIVE FREE SYRINGE (34846) 
- varicella-zoster recombinant, PF, (SHINGRIX, PF,) 50 mcg/0.5 mL susr injection; 0.5 mL by IntraMuscular for 2 doses at least 2 months apart. Please fax confirmation attention of prescribing provider at [phone]. Indications: PREVENTION OF HERPES ZOSTER  Dispense: 0.5 mL; Refill: 1 I have discussed the diagnosis with the patient and the intended plan as seen in the above orders. she has expressed understanding. The patient has received an after-visit summary and questions were answered concerning future plans.   I have discussed medication side effects and warnings with the patient as well. Electronically Signed: Antonia Chauhan MD 
  
History/Allergies Patients past medical, surgical and family histories were reviewed and updated. Past Medical History:  
Diagnosis Date  Depression 8/30/2010  GERD (gastroesophageal reflux disease) 12/30/2011  Hyperlipidemia 12/30/2011  Hypertension Past Surgical History:  
Procedure Laterality Date [address]  
 closed hole in heart [phone] [facility] Family History Problem Relation Age of Onset  Diabetes Mother  Hypertension Mother  Cancer Maternal Grandmother  Asthma Daughter  Cancer Father   
  lung cancer  Diabetes Sister  Alcohol abuse Neg Hx  Arthritis-rheumatoid Neg Hx  Bleeding Prob Neg Hx  Elevated Lipids Neg Hx   
 Headache Neg Hx   
 Heart Disease Neg Hx  Lung Disease Neg Hx  Migraines Neg Hx  Psychiatric Disorder Neg Hx  Stroke Neg Hx  Mental Retardation Neg Hx Social History Social History  Marital status: SINGLE Spouse name: N/A  
 Number of children: N/A  
 Years of education: N/A Occupational History  Not on file. Social History Main Topics  Smoking status: Never Smoker  Smokeless tobacco: Never Used  Alcohol use Yes Comment: 2 beers every other day  Drug use: No  
 Sexual activity: Yes Other Topics Concern  Not on file Social History Narrative No Known Allergies Disposition Follow-up Disposition: 
Return in about 6 months (around 4/4/2019) for Routine with blood work. Future Appointments Date Time Provider Hossein Humphrey 10/10/2018 3:20 PM Keeley Frazier MD [address] Current Medications after this visit Current Outpatient Prescriptions Medication Sig  
 lisinopril (PRINIVIL, ZESTRIL) 20 mg tablet Take 1 Tab by mouth daily.  naproxen (NAPROSYN) 500 mg tablet Take 1 Tab by mouth two (2) times daily (with meals).  aspirin delayed-release 81 mg tablet Take 1 Tab by mouth daily.  varicella-zoster recombinant, PF, (SHINGRIX, PF,) 50 mcg/0.5 mL susr injection 0.5 mL by IntraMuscular for 2 doses at least 2 months apart. Please fax confirmation attention of prescribing provider at [phone]. Indications: PREVENTION OF HERPES ZOSTER  
 metoprolol tartrate (LOPRESSOR) 50 mg tablet TAKE ONE TABLET BY MOUTH TWICE DAILY  hydroCHLOROthiazide (HYDRODIURIL) 25 mg tablet TAKE ONE TABLET BY MOUTH ONCE DAILY  spironolactone (ALDACTONE) 25 mg tablet Take 1 Tab by mouth daily.  citalopram (CELEXA) 10 mg tablet Take 1 Tab by mouth daily.  lovastatin (MEVACOR) 20 mg tablet Take 1 Tab by mouth nightly. Indications: myocardial infarction prevention  MULTIVITAMIN WITH MINERALS (ONE-A-DAY 50 PLUS PO) Take  by mouth.  cholecalciferol, vitamin D3, (VITAMIN D3) 2,000 unit tab Take  by mouth. No current facility-administered medications for this visit. Medications Discontinued During This Encounter Medication Reason  lisinopril (PRINIVIL, ZESTRIL) 20 mg tablet Reorder  naproxen (NAPROSYN) 500 mg tablet Reorder  aspirin delayed-release 81 mg tablet Reorder

## 2018-10-04 NOTE — MR AVS SNAPSHOT
Jaz Whitfield 
 
 
 2007 [address] [id number] [phone] Patient: Ángela Vidales MRN: [id number] JXN:[id number] Visit Information Date & Time Provider Department Dept. Phone Encounter #  
 10/4/2018  9:10 AM Ayana Kebede MD [address] [id number] Follow-up Instructions Return in about 6 months (around 4/4/2019) for Routine with blood work. Your Appointments 10/10/2018  3:20 PM  
ESTABLISHED PATIENT with Juan Sanchez MD  
[facility] ([facility] SCHEDULING) Appt Note: 6 MONTH F/U  
 [address] [facility] [address]  
  
   
 [address] [place] [address] Upcoming Health Maintenance Date Due  
 PAP AKA CERVICAL CYTOLOGY 1/21/2012 Shingrix Vaccine Age 50> (1 of 2) 6/16/2013 BREAST CANCER SCRN MAMMOGRAM 6/16/2013 Influenza Age 5 to Adult 8/1/2018 DTaP/Tdap/Td series (1 - Tdap) 10/21/2018* FOBT Q 1 YEAR AGE 50-75 5/22/2019 *Topic was postponed. The date shown is not the original due date. Allergies as of 10/4/2018  Review Complete On: 10/4/2018 By: Ayana Kebede MD  
 No Known Allergies Current Immunizations  Reviewed on 8/30/2010 Name Date H1N1 FLU VACCINE 1/14/2010 Influenza Vaccine (Quad) PF  Incomplete, 10/5/2017, 11/18/2016 Not reviewed this visit You Were Diagnosed With   
  
 Codes Comments Essential hypertension with goal blood pressure less than 140/90    -  Primary ICD-10-CM: I10 
ICD-9-CM: 401.9 Vitamin D deficiency     ICD-10-CM: E55.9 ICD-9-CM: 268.9 Anxiety     ICD-10-CM: F41.9 ICD-9-CM: 300.00 Encounter for immunization     ICD-10-CM: Y41 ICD-9-CM: V03.89 Vitals BP Pulse Temp Resp Height(growth percentile) Weight(growth percentile) 137/85 70 98.3 °F (36.8 °C) (Oral) 18 4' 9\" (1.448 m) 142 lb (64.4 kg) SpO2 BMI OB Status Smoking Status 95% 30.73 kg/m2 Premenopausal Never Smoker Vitals History BMI and BSA Data Body Mass Index Body Surface Area 30.73 kg/m 2 1.61 m 2 Preferred Pharmacy Pharmacy Name Phone [facility] [address] [place] [address] [phone] Your Updated Medication List  
  
   
This list is accurate as of 10/4/18  9:29 AM.  Always use your most recent med list.  
  
  
  
  
 aspirin delayed-release 81 mg tablet Take 1 Tab by mouth daily. citalopram 10 mg tablet Commonly known as:  Danilo Parma Take 1 Tab by mouth daily. hydroCHLOROthiazide 25 mg tablet Commonly known as:  HYDRODIURIL  
TAKE ONE TABLET BY MOUTH ONCE DAILY  
  
 lisinopril 20 mg tablet Commonly known as:  Charlcie Side Take 1 Tab by mouth daily. lovastatin 20 mg tablet Commonly known as:  MEVACOR Take 1 Tab by mouth nightly. Indications: myocardial infarction prevention  
  
 metoprolol tartrate 50 mg tablet Commonly known as:  LOPRESSOR  
TAKE ONE TABLET BY MOUTH TWICE DAILY  
  
 naproxen 500 mg tablet Commonly known as:  NAPROSYN Take 1 Tab by mouth two (2) times daily (with meals). ONE-A-DAY 50 PLUS PO Take  by mouth. spironolactone 25 mg tablet Commonly known as:  ALDACTONE Take 1 Tab by mouth daily. varicella-zoster recombinant (PF) 50 mcg/0.5 mL Susr injection Commonly known as:  SHINGRIX (PF)  
0.5 mL by IntraMuscular for 2 doses at least 2 months apart. Please fax confirmation attention of prescribing provider at [phone]. Indications: PREVENTION OF HERPES ZOSTER  
  
 VITAMIN D3 2,000 unit Tab Generic drug:  cholecalciferol (vitamin D3) Take  by mouth. Prescriptions Printed Refills  
 varicella-zoster recombinant, PF, (SHINGRIX, PF,) 50 mcg/0.5 mL susr injection 1 Si.5 mL by IntraMuscular for 2 doses at least 2 months apart.  Please fax confirmation attention of prescribing provider at [phone]. Indications: PREVENTION OF HERPES ZOSTER Class: Print Prescriptions Sent to Pharmacy Refills  
 lisinopril (PRINIVIL, ZESTRIL) 20 mg tablet 1 Sig: Take 1 Tab by mouth daily. Class: Normal  
 Pharmacy: [facility] DR EARL JOHNSON [address] [place] [address] Ph #: [phone] Route: Oral  
 naproxen (NAPROSYN) 500 mg tablet 1 Sig: Take 1 Tab by mouth two (2) times daily (with meals). Class: Normal  
 Pharmacy: [facility] [address] [place] [address] Ph #: [phone] Route: Oral  
 aspirin delayed-release 81 mg tablet 1 Sig: Take 1 Tab by mouth daily. Class: Normal  
 Pharmacy: [facility] DR EARL JOHNSON [address] [place] [address] Ph #: [phone] Route: Oral  
  
We Performed the Following INFLUENZA VIRUS VAC QUAD,SPLIT,PRESV FREE SYRINGE IM O319075 CPT(R)] LIPID PANEL [02835 CPT(R)] METABOLIC PANEL, BASIC [52419 CPT(R)] RI IMMUNIZ ADMIN,1 SINGLE/COMB VAC/TOXOID [id number] CPT(R)] VITAMIN D, 25 HYDROXY [id number] CPT(R)] Follow-up Instructions Return in about 6 months (around 4/4/2019) for Routine with blood work. Please provide this summary of care documentation to your next provider. Your primary care clinician is listed as Rosalia Hansen. If you have any questions after today's visit, please call [phone].

## 2018-10-05 LAB
25(OH)D3+25(OH)D2 SERPL-MCNC: 25 NG/ML (ref 30–100)
BUN SERPL-MCNC: 12 MG/DL (ref 6–24)
BUN/CREAT SERPL: 15 (ref 9–23)
CALCIUM SERPL-MCNC: 10.3 MG/DL (ref 8.7–10.2)
CHLORIDE SERPL-SCNC: 98 MMOL/L (ref 96–106)
CHOLEST SERPL-MCNC: 181 MG/DL (ref 100–199)
CO2 SERPL-SCNC: 24 MMOL/L (ref 20–29)
CREAT SERPL-MCNC: 0.8 MG/DL (ref 0.57–1)
GLUCOSE SERPL-MCNC: 92 MG/DL (ref 65–99)
HDLC SERPL-MCNC: 68 MG/DL
LDLC SERPL CALC-MCNC: 95 MG/DL (ref 0–99)
POTASSIUM SERPL-SCNC: 4.6 MMOL/L (ref 3.5–5.2)
SODIUM SERPL-SCNC: 139 MMOL/L (ref 134–144)
TRIGL SERPL-MCNC: 91 MG/DL (ref 0–149)
VLDLC SERPL CALC-MCNC: 18 MG/DL (ref 5–40)

## 2018-12-04 ENCOUNTER — OFFICE VISIT (OUTPATIENT)
Dept: FAMILY MEDICINE CLINIC | Age: 53
End: 2018-12-04

## 2018-12-04 VITALS
DIASTOLIC BLOOD PRESSURE: 94 MMHG | HEIGHT: 57 IN | SYSTOLIC BLOOD PRESSURE: 152 MMHG | TEMPERATURE: 98.1 F | RESPIRATION RATE: 20 BRPM | HEART RATE: 74 BPM | WEIGHT: 148 LBS | OXYGEN SATURATION: 96 % | BODY MASS INDEX: 31.93 KG/M2

## 2018-12-04 DIAGNOSIS — I10 ESSENTIAL HYPERTENSION: ICD-10-CM

## 2018-12-04 DIAGNOSIS — F33.0 MILD EPISODE OF RECURRENT MAJOR DEPRESSIVE DISORDER (HCC): ICD-10-CM

## 2018-12-04 DIAGNOSIS — E78.2 MIXED HYPERLIPIDEMIA: ICD-10-CM

## 2018-12-04 DIAGNOSIS — K04.7 DENTAL ABSCESS: Primary | ICD-10-CM

## 2018-12-04 RX ORDER — LISINOPRIL 40 MG/1
40 TABLET ORAL DAILY
Qty: 30 TAB | Refills: 2 | Status: SHIPPED | OUTPATIENT
Start: 2018-12-04 | End: 2019-07-01 | Stop reason: SDUPTHER

## 2018-12-04 RX ORDER — SPIRONOLACTONE 25 MG/1
25 TABLET ORAL DAILY
Qty: 30 TAB | Refills: 5 | Status: SHIPPED | OUTPATIENT
Start: 2018-12-04 | End: 2019-08-15 | Stop reason: SDUPTHER

## 2018-12-04 RX ORDER — AMPICILLIN 500 MG/1
500 CAPSULE ORAL 3 TIMES DAILY
Qty: 30 CAP | Refills: 0 | Status: SHIPPED | OUTPATIENT
Start: 2018-12-04 | End: 2019-05-23 | Stop reason: ALTCHOICE

## 2018-12-04 RX ORDER — CITALOPRAM 10 MG/1
10 TABLET ORAL DAILY
Qty: 30 TAB | Refills: 4 | Status: SHIPPED | OUTPATIENT
Start: 2018-12-04 | End: 2019-08-15 | Stop reason: SDUPTHER

## 2018-12-04 RX ORDER — LOVASTATIN 20 MG/1
20 TABLET ORAL
Qty: 90 TAB | Refills: 1 | Status: SHIPPED | OUTPATIENT
Start: 2018-12-04 | End: 2019-08-02 | Stop reason: SDUPTHER

## 2018-12-04 NOTE — PROGRESS NOTES
1. Have you been to the ER, urgent care clinic since your last visit? Hospitalized since your last visit? No    2. Have you seen or consulted any other health care providers outside of the [address] [place] [address] since your last visit? Include any pap smears or colon screening.  No  Reviewed record in preparation for visit and have necessary documentation  Pt did not bring medication to office visit for review    Goals that were addressed and/or need to be completed during or after this appointment include   Health Maintenance Due   Topic Date Due    DTaP/Tdap/Td series (1 - Tdap) 06/16/1984    PAP AKA CERVICAL CYTOLOGY  01/21/2012    Shingrix Vaccine Age 50> (1 of 2) 06/16/2013    BREAST CANCER SCRN MAMMOGRAM  06/16/2013

## 2018-12-04 NOTE — PATIENT INSTRUCTIONS
STOP taking Lisinopril 20mg and START taking lisinopril 40mg. Your other prescriptions have been sent to the pharmacy. Herrera Frazier with [facility]  [address] [place] [address],  [address] [facility], [address] [place] [address]  [phone]    Monitor blood pressure outside the office several times weekly at different times during the day and evening. Bring the record to me in 3 weeks for review. Blood Pressure Record     Patient Name:  ______________________ :  ______________________    Date/Time BP Reading Pulse                                                                                                                                                                                                Home Blood Pressure Test: About This Test  What is it? A home blood pressure test allows you to keep track of your blood pressure at home. Blood pressure is a measure of the force of blood against the walls of your arteries. Blood pressure readings include two numbers, such as 130/80 (say \"130 over 80\"). The first number is the systolic pressure. The second number is the diastolic pressure. Why is this test done? You may do this test at home to:  · Find out if you have high blood pressure. · Track your blood pressure if you have high blood pressure. · Track how well medicine is working to reduce high blood pressure. · Check how lifestyle changes, such as weight loss and exercise, are affecting blood pressure. How can you prepare for the test?  · Do not use caffeine, tobacco, or medicines known to raise blood pressure (such as nasal decongestant sprays) for at least 30 minutes before taking your blood pressure. · Do not exercise for at least 30 minutes before taking your blood pressure. What happens before the test?  Take your blood pressure while you feel comfortable and relaxed.  Sit quietly with both feet on the floor for at least 5 minutes before the test.  What happens during the test?  · Sit with your arm slightly bent and resting on a table so that your upper arm is at the same level as your heart. · Roll up your sleeve or take off your shirt to expose your upper arm. · Wrap the blood pressure cuff around your upper arm so that the lower edge of the cuff is about 1 inch above the bend of your elbow. Proceed with the following steps depending on if you are using an automatic or manual pressure monitor. Automatic blood pressure monitors  · Press the on/off button on the automatic monitor and wait until the ready-to-measure \"heart\" symbol appears next to zero in the display window. · Press the start button. The cuff will inflate and deflate by itself. · Your blood pressure numbers will appear on the screen. · Write your numbers in your log book, along with the date and time. Manual blood pressure monitors  · Place the earpieces of a stethoscope in your ears, and place the bell of the stethoscope over the artery, just below the cuff. · Close the valve on the rubber inflating bulb. · Squeeze the bulb rapidly with your opposite hand to inflate the cuff until the dial or column of mercury reads about 30 mm Hg higher than your usual systolic pressure. If you do not know your usual pressure, inflate the cuff to 210 mm Hg or until the pulse at your wrist disappears. · Open the pressure valve just slightly by twisting or pressing the valve on the bulb. · As you watch the pressure slowly fall, note the level on the dial at which you first start to hear a pulsing or tapping sound through the stethoscope. This is your systolic blood pressure. · Continue letting the air out slowly. The sounds will become muffled and will finally disappear. Note the pressure when the sounds completely disappear. This is your diastolic blood pressure. Let out all the remaining air. · Write your numbers in your log book, along with the date and time.   What else should you know about the test?  Here are the categories of blood pressure for adults:  Ideal blood pressure. Systolic is less than 510, and diastolic is less than 80. Elevated blood pressure. Systolic is 690 to 933, and diastolic is less than 80. High blood pressure (hypertension). Systolic is 663 or above. Diastolic is 80 or above. One or both numbers may be high. It is more accurate to take the average of several readings made throughout the day than to rely on a single reading. Follow-up care is a key part of your treatment and safety. Be sure to make and go to all appointments, and call your doctor if you are having problems. It's also a good idea to keep a list of the medicines you take. Where can you learn more? Go to http://braden-soto.info/. Enter C427 in the search box to learn more about \"Home Blood Pressure Test: About This Test.\"  Current as of: December 6, 2017  Content Version: 11.8  © 5077-3298 [employer]. Care instructions adapted under license by [employer] (which disclaims liability or warranty for this information). If you have questions about a medical condition or this instruction, always ask your healthcare professional. [place] [address] any warranty or liability for your use of this information. Periodontal Abscess: Care Instructions  Your Care Instructions    A periodontal abscess is a pocket of pus in the tissues of the gum. It looks like a small red ball pushing out of the swollen gum. An abscess can occur with serious gum disease (periodontitis), which causes the gums to pull away from the teeth. This leaves deep pockets where bacteria can grow. If tartar builds up too much, or if food gets stuck in the pockets, pus forms. If the pus can't drain, it forms an abscess. An abscess can cause a fever and a throbbing pain in nearby teeth. It can also cause long-term damage to your teeth and gums. The teeth may get loose and fall out.  The infection can spread to another part of your body. In most cases, your dentist will give you antibiotics to stop the infection. He or she may need to cut open (josé) the abscess so that the infection can drain. This should relieve your pain. You may also need more dental treatment, such as tooth removal or oral surgery to fix bone damage caused by the abscess. Follow-up care is a key part of your treatment and safety. Be sure to make and go to all appointments, and call your doctor if you are having problems. It's also a good idea to know your test results and keep a list of the medicines you take. How can you care for yourself at home? · Reduce pain and swelling in your face and jaw by putting ice or a cold pack on the outside of your cheek for 10 to 20 minutes at a time. Put a thin cloth between the ice and your skin. · Be safe with medicines. Read and follow all instructions on the label. ? If the doctor gave you a prescription medicine for pain, take it as prescribed. ? If you are not taking a prescription pain medicine, ask your doctor if you can take an over-the-counter medicine. · Take your antibiotics as directed. Do not stop taking them just because you feel better. You need to take the full course of antibiotics. To prevent periodontal abscess  · Brush and floss every day, and have regular dental checkups. · Eat a healthy diet, and avoid sugary foods and drinks. · Do not smoke or use spit tobacco. Tobacco use slows your ability to heal. It also increases your risk for gum disease and cancer of the mouth and throat. If you need help quitting, talk to your doctor about stop-smoking programs and medicines. These can increase your chances of quitting for good. When should you call for help? Call 911 anytime you think you may need emergency care.  For example, call if:    · You have trouble breathing.    Call your doctor now or seek immediate medical care if:    · You have new or worse symptoms of infection, such as:  ? Increased pain, swelling, warmth, or redness. ? Red streaks leading from the area. ? Pus draining from the area. ? A fever.    Watch closely for changes in your health, and be sure to contact your doctor if:    · You do not get better as expected. Where can you learn more? Go to http://braden-soto.info/. Enter O179 in the search box to learn more about \"Periodontal Abscess: Care Instructions. \"  Current as of: March 28, 2018  Content Version: 11.8  © 1078-7402 [employer]. Care instructions adapted under license by [employer] (which disclaims liability or warranty for this information). If you have questions about a medical condition or this instruction, always ask your healthcare professional. [address] any warranty or liability for your use of this information.

## 2018-12-04 NOTE — PROGRESS NOTES
Subjective  CC: Chaim Harden is an 52 y.o. female presents for mouth pain     Started the day before thanksgiving. She is aware that she needs her tooth pulled but is financially unable to get it done and has limited transportation. She has not had any fevers. She tried alleve for pain every 6 hours. Last dose last night. It helped a bit. BP is elevated today. She did not take her BP meds today. When she takes her BP at home she reports her BP is 140s-150s/80s. Patient also needs refills for BP, cholesterol, and depression. Allergies - reviewed:   No Known Allergies      Medications - reviewed:   Current Outpatient Medications   Medication Sig    lisinopril (PRINIVIL, ZESTRIL) 20 mg tablet Take 1 Tab by mouth daily.  naproxen (NAPROSYN) 500 mg tablet Take 1 Tab by mouth two (2) times daily (with meals).  aspirin delayed-release 81 mg tablet Take 1 Tab by mouth daily.  metoprolol tartrate (LOPRESSOR) 50 mg tablet TAKE ONE TABLET BY MOUTH TWICE DAILY    hydroCHLOROthiazide (HYDRODIURIL) 25 mg tablet TAKE ONE TABLET BY MOUTH ONCE DAILY    spironolactone (ALDACTONE) 25 mg tablet Take 1 Tab by mouth daily.  citalopram (CELEXA) 10 mg tablet Take 1 Tab by mouth daily.  lovastatin (MEVACOR) 20 mg tablet Take 1 Tab by mouth nightly. Indications: myocardial infarction prevention    MULTIVITAMIN WITH MINERALS (ONE-A-DAY 50 PLUS PO) Take  by mouth.  cholecalciferol, vitamin D3, (VITAMIN D3) 2,000 unit tab Take  by mouth.  varicella-zoster recombinant, PF, (SHINGRIX, PF,) 50 mcg/0.5 mL susr injection 0.5 mL by IntraMuscular for 2 doses at least 2 months apart. Please fax confirmation attention of prescribing provider at [phone]. Indications: PREVENTION OF HERPES ZOSTER     No current facility-administered medications for this visit.           Past Medical History - reviewed:  Past Medical History:   Diagnosis Date    Depression 8/30/2010    GERD (gastroesophageal reflux disease) 12/30/2011    Hyperlipidemia 12/30/2011    Hypertension          Immunizations - reviewed:   Immunization History   Administered Date(s) Administered    H1N1 Influenza Virus Vaccine 01/14/2010    Influenza Vaccine (Quad) PF 11/18/2016, 10/05/2017, 10/04/2018         ROS  Review of Systems : A complete review of systems was performed and is negative except for those mentioned in the HPI. Physical Exam  Visit Vitals  BP (!) 152/94 (BP 1 Location: Left arm, BP Patient Position: Sitting) Comment: pt did not take medication today   Pulse 74   Temp 98.1 °F (36.7 °C) (Oral)   Resp 20   Ht 4' 9\" (1.448 m)   Wt 148 lb (67.1 kg)   SpO2 96%   BMI 32.03 kg/m²       General appearance - Alert, NAD. Head: Atraumatic. Normocephalic. No lymphadenopathy  Eyes: EOMI. Sclera white. Throat/Mouth: pharynx clear, no exudates. No visible abscess but swelling of left cheek. Dental caries present throughout mouth. Respiratory - LCTAB. No wheeze/rale/rhonchi  Heart - Normal rate, regular rhythm. No m/r/r  Musculoskeletal - Normal ROM, Gait normal.    Extremities - No LE edema. Distal pulses intact  Skin - normal coloration and normal turgor. No cyanosis, no rash. Assessment/Plan  1. Essential hypertension - Not at goal, will increase Lisinopril to 40mg  - spironolactone (ALDACTONE) 25 mg tablet; Take 1 Tab by mouth daily. Dispense: 30 Tab; Refill: 5    2. Mild episode of recurrent major depressive disorder (HCC)  - citalopram (CELEXA) 10 mg tablet; Take 1 Tab by mouth daily. Dispense: 30 Tab; Refill: 4    3. Mixed hyperlipidemia  - lovastatin (MEVACOR) 20 mg tablet; Take 1 Tab by mouth nightly. Dispense: 90 Tab; Refill: 1    4. Dental abscess - Stressed importance of following up with dentist. Provided resource in [place] that may be able to provide discounted service. - ampicillin (PRINCIPEN) 500 mg capsule; Take 1 Cap by mouth three (3) times daily. Dispense: 30 Cap;  Refill: 0        Follow-up Disposition:  Return for needs to schedule an appointment with PCP due for PAP. I have discussed the aforementioned diagnoses and plan with the patient in detail. I have provided information in person and/or in AVS. All questions answered prior to discharge.     Silverio Santos MD  Family Medicine Resident  PGY 2

## 2019-05-23 ENCOUNTER — OFFICE VISIT (OUTPATIENT)
Dept: FAMILY MEDICINE CLINIC | Age: 54
End: 2019-05-23

## 2019-05-23 VITALS
HEIGHT: 57 IN | TEMPERATURE: 98.2 F | RESPIRATION RATE: 16 BRPM | HEART RATE: 77 BPM | BODY MASS INDEX: 31.28 KG/M2 | SYSTOLIC BLOOD PRESSURE: 123 MMHG | WEIGHT: 145 LBS | OXYGEN SATURATION: 97 % | DIASTOLIC BLOOD PRESSURE: 82 MMHG

## 2019-05-23 DIAGNOSIS — Z01.419 WELL WOMAN EXAM: ICD-10-CM

## 2019-05-23 DIAGNOSIS — M25.561 ACUTE PAIN OF RIGHT KNEE: ICD-10-CM

## 2019-05-23 DIAGNOSIS — M25.521 RIGHT ELBOW PAIN: Primary | ICD-10-CM

## 2019-05-23 NOTE — PROGRESS NOTES
Chief Complaint   Patient presents with    Blood Pressure Check    Arm Pain     Right    Knee Pain     Right      Body mass index is 31.38 kg/m². 1. Have you been to the ER, urgent care clinic since your last visit? Hospitalized since your last visit? No    2. Have you seen or consulted any other health care providers outside of the [address] [place] [address] since your last visit? Include any pap smears or colon screening.  No    Reviewed record in preparation for visit and have necessary documentation  Pt did not bring medication to office visit for review  Information was given to pt on Advanced Directives, Living Will  Information was given on Shingles Vaccine  Opportunity was given for questions  Goals that were addressed and/or need to be completed after this appointment include:     Health Maintenance Due   Topic Date Due    DTaP/Tdap/Td series (1 - Tdap) 06/16/1984    PAP AKA CERVICAL CYTOLOGY  01/21/2012    Shingrix Vaccine Age 50> (1 of 2) 06/16/2013    BREAST CANCER SCRN MAMMOGRAM  06/16/2013    FOBT Q 1 YEAR AGE 50-75  05/22/2019

## 2019-05-23 NOTE — PROGRESS NOTES
Subjective  CC: Jossue Mandujano is an 52 y.o. female presents for BP and new knee and arm pain    She states that her right elbow and knee have been bothering her. She has been using an OTC cream which has not helped very much. She tried iburpfen 200mg every 4 hours which helped more but made her sleepy. She states pain has been there for the last 2 weeks. Denies injuries. Currently not working and denies repertitive motions. Denies numbness/tingling in hand and feet, no CP or SOB or LE swelling. Allergies - reviewed:   No Known Allergies      Medications - reviewed:   Current Outpatient Medications   Medication Sig    spironolactone (ALDACTONE) 25 mg tablet Take 1 Tab by mouth daily.  citalopram (CELEXA) 10 mg tablet Take 1 Tab by mouth daily.  lovastatin (MEVACOR) 20 mg tablet Take 1 Tab by mouth nightly.  lisinopril (PRINIVIL, ZESTRIL) 40 mg tablet Take 1 Tab by mouth daily.  naproxen (NAPROSYN) 500 mg tablet Take 1 Tab by mouth two (2) times daily (with meals).  aspirin delayed-release 81 mg tablet Take 1 Tab by mouth daily.  metoprolol tartrate (LOPRESSOR) 50 mg tablet TAKE ONE TABLET BY MOUTH TWICE DAILY    hydroCHLOROthiazide (HYDRODIURIL) 25 mg tablet TAKE ONE TABLET BY MOUTH ONCE DAILY    MULTIVITAMIN WITH MINERALS (ONE-A-DAY 50 PLUS PO) Take  by mouth.  cholecalciferol, vitamin D3, (VITAMIN D3) 2,000 unit tab Take  by mouth.  ampicillin (PRINCIPEN) 500 mg capsule Take 1 Cap by mouth three (3) times daily. No current facility-administered medications for this visit.           Past Medical History - reviewed:  Past Medical History:   Diagnosis Date    Depression 8/30/2010    GERD (gastroesophageal reflux disease) 12/30/2011    Hyperlipidemia 12/30/2011    Hypertension          Immunizations - reviewed:   Immunization History   Administered Date(s) Administered    H1N1 Influenza Virus Vaccine 01/14/2010    Influenza Vaccine (Quad) PF 11/18/2016, 10/05/2017, 10/04/2018 ROS  Review of Systems : A review of systems was performed. All pertinent negatives and positives are mentioned in the HPI. Physical Exam  Visit Vitals  /82 (BP 1 Location: Right arm, BP Patient Position: Sitting)   Pulse 77   Temp 98.2 °F (36.8 °C) (Oral)   Resp 16   Ht 4' 9\" (1.448 m)   Wt 145 lb (65.8 kg)   SpO2 97%   BMI 31.38 kg/m²       General appearance - Alert, NAD. Head: Atraumatic. Normocephalic. No lymphadenopathy  Eyes: EOMI. Sclera white. Respiratory - LCTAB. No wheeze/rale/rhonchi  Heart - Normal rate, regular rhythm. No m/r/r  Abdomen - Soft, non tender. Non distended. Neurological - No focal deficits. Speech normal.   Musculoskeletal - Normal ROM, mild crepitus in right knee. Knee stability intact to provacative maneuvers. Gait normal. No TTP over olecranon and negative tenels at elbow. Extremities - No LE edema. Distal pulses intact  Skin - normal coloration and normal turgor. No cyanosis, no rash. Assessment/Plan  1. Right elbow pain - possibly due to overuse vs OA.  - NSAIDS PRN    2. Acute pain of right knee - likely due to mild OA  - NSAIDS PRN    3. Well woman exam - discussed with patient that she is due for various health maintenance. Advised follow-up for complete physical  - CBC WITH AUTOMATED DIFF  - METABOLIC PANEL, COMPREHENSIVE  - LIPID PANEL          I have discussed the aforementioned diagnoses and plan with the patient in detail. I have provided information in person and/or in AVS. All questions answered prior to discharge.     Simona Yee MD  Family Medicine Resident

## 2019-05-23 NOTE — PATIENT INSTRUCTIONS
Use capsaicin cream as needed for joint pain. You may also use 600mg of ibuprofen every 6 hours as needed for elbow and knee pain. Osteoarthritis: Care Instructions Your Care Instructions Arthritis is a common health problem in which the joints are inflamed. There are several kinds of arthritis. Osteoarthritis is caused by a breakdown of cartilage, the hard, thick tissue that cushions the joints. It causes pain, stiffness, and swelling, often in the spine, fingers, hips, and knees. Osteoarthritis can happen at any age, but it is most common in older people. Osteoarthritis never goes away completely, but it can be controlled. Medicine and home treatment can reduce the pain and prevent the arthritis from getting worse. Follow-up care is a key part of your treatment and safety. Be sure to make and go to all appointments, and call your doctor if you are having problems. It's also a good idea to know your test results and keep a list of the medicines you take. How can you care for yourself at home? · Take a warm shower or bath in the morning to relieve stiffness. Avoid sitting still afterwards. · If the joint is not swollen, use moist heat, like a warm, damp towel, for 20 to 30 minutes, 2 or 3 times a day. Do not use heat on a swollen joint. · If the joint is swollen, use ice or cold packs for 10 to 20 minutes, once an hour. Cold will help relieve pain and reduce inflammation. Put a thin cloth between the ice and your skin. · To prevent stiffness, gently move the joint through its full range of motion several times a day. · If the joint hurts, avoid activities that put a strain on it for a few days. Take rest breaks throughout the day. · Get regular exercise. Walking, swimming, yoga, biking, lam chi, and water aerobics are good exercises that are gentle on the joints. · Reach and stay at a healthy weight.  If you need to lose or maintain weight, regular exercise and a healthy diet will help. Extra weight can strain the joints, especially the knees and hips, and make the pain worse. Losing even a few pounds may help. · Take pain medicines exactly as directed. ? If the doctor gave you a prescription medicine for pain, take it as prescribed. ? If you are not taking a prescription pain medicine, ask your doctor if you can take an over-the-counter medicine. When should you call for help? Call your doctor now or seek immediate medical care if: 
  · The pain is so bad that you cannot use the joint.  
  · You have sudden back pain with weakness in your legs or loss of bowel or bladder control.  
  · Your stools are black and tarlike or have streaks of blood.  
  · You have severe pain and swelling in more than one joint.  
 Watch closely for changes in your health, and be sure to contact your doctor if: 
  · You have side effects from the medicines, like belly pain, ongoing heartburn, or nausea.  
  · Joint pain continues for more than 6 weeks, and home treatment is not helping. Where can you learn more? Go to http://braden-soto.info/. Enter X347 in the search box to learn more about \"Osteoarthritis: Care Instructions. \" Current as of: Noa 10, 2018 Content Version: 11.9 © 3970-9061 [employer]. Care instructions adapted under license by [employer] (which disclaims liability or warranty for this information). If you have questions about a medical condition or this instruction, always ask your healthcare professional. [place] [address] any warranty or liability for your use of this information.

## 2019-05-24 LAB
ALBUMIN SERPL-MCNC: 4.8 G/DL (ref 3.5–5.5)
ALBUMIN/GLOB SERPL: 1.7 {RATIO} (ref 1.2–2.2)
ALP SERPL-CCNC: 79 IU/L (ref 39–117)
ALT SERPL-CCNC: 16 IU/L (ref 0–32)
AST SERPL-CCNC: 20 IU/L (ref 0–40)
BASOPHILS # BLD AUTO: 0 X10E3/UL (ref 0–0.2)
BASOPHILS NFR BLD AUTO: 0 %
BILIRUB SERPL-MCNC: 0.6 MG/DL (ref 0–1.2)
BUN SERPL-MCNC: 13 MG/DL (ref 6–24)
BUN/CREAT SERPL: 15 (ref 9–23)
CALCIUM SERPL-MCNC: 9.7 MG/DL (ref 8.7–10.2)
CHLORIDE SERPL-SCNC: 100 MMOL/L (ref 96–106)
CHOLEST SERPL-MCNC: 162 MG/DL (ref 100–199)
CO2 SERPL-SCNC: 21 MMOL/L (ref 20–29)
CREAT SERPL-MCNC: 0.84 MG/DL (ref 0.57–1)
EOSINOPHIL # BLD AUTO: 0.1 X10E3/UL (ref 0–0.4)
EOSINOPHIL NFR BLD AUTO: 2 %
ERYTHROCYTE [DISTWIDTH] IN BLOOD BY AUTOMATED COUNT: 15.3 % (ref 12.3–15.4)
GLOBULIN SER CALC-MCNC: 2.9 G/DL (ref 1.5–4.5)
GLUCOSE SERPL-MCNC: 93 MG/DL (ref 65–99)
HCT VFR BLD AUTO: 35.6 % (ref 34–46.6)
HDLC SERPL-MCNC: 61 MG/DL
HGB BLD-MCNC: 11.7 G/DL (ref 11.1–15.9)
IMM GRANULOCYTES # BLD AUTO: 0 X10E3/UL (ref 0–0.1)
IMM GRANULOCYTES NFR BLD AUTO: 0 %
LDLC SERPL CALC-MCNC: 81 MG/DL (ref 0–99)
LYMPHOCYTES # BLD AUTO: 1.5 X10E3/UL (ref 0.7–3.1)
LYMPHOCYTES NFR BLD AUTO: 25 %
MCH RBC QN AUTO: 29.3 PG (ref 26.6–33)
MCHC RBC AUTO-ENTMCNC: 32.9 G/DL (ref 31.5–35.7)
MCV RBC AUTO: 89 FL (ref 79–97)
MONOCYTES # BLD AUTO: 0.8 X10E3/UL (ref 0.1–0.9)
MONOCYTES NFR BLD AUTO: 13 %
MORPHOLOGY BLD-IMP: NORMAL
NEUTROPHILS # BLD AUTO: 3.7 X10E3/UL (ref 1.4–7)
NEUTROPHILS NFR BLD AUTO: 60 %
PLATELET # BLD AUTO: 154 X10E3/UL (ref 150–450)
POTASSIUM SERPL-SCNC: 4.3 MMOL/L (ref 3.5–5.2)
PROT SERPL-MCNC: 7.7 G/DL (ref 6–8.5)
RBC # BLD AUTO: 4 X10E6/UL (ref 3.77–5.28)
SODIUM SERPL-SCNC: 138 MMOL/L (ref 134–144)
TRIGL SERPL-MCNC: 98 MG/DL (ref 0–149)
VLDLC SERPL CALC-MCNC: 20 MG/DL (ref 5–40)
WBC # BLD AUTO: 6.2 X10E3/UL (ref 3.4–10.8)

## 2019-05-24 NOTE — PROGRESS NOTES
I reviewed the Resident's assessment and agree with the plan as documented based on the findings documented in the note. Vanessa Azevedo M.D.

## 2019-07-30 DIAGNOSIS — I10 ESSENTIAL HYPERTENSION WITH GOAL BLOOD PRESSURE LESS THAN 140/90: ICD-10-CM

## 2019-07-31 RX ORDER — HYDROCHLOROTHIAZIDE 25 MG/1
TABLET ORAL
Qty: 30 TAB | Refills: 0 | OUTPATIENT
Start: 2019-07-31

## 2019-07-31 RX ORDER — ASPIRIN 81 MG/1
81 TABLET ORAL DAILY
Qty: 30 TAB | Refills: 0 | Status: SHIPPED | OUTPATIENT
Start: 2019-07-31 | End: 2019-08-15 | Stop reason: SDUPTHER

## 2019-07-31 RX ORDER — HYDROCHLOROTHIAZIDE 25 MG/1
25 TABLET ORAL DAILY
Qty: 30 TAB | Refills: 0 | Status: SHIPPED | OUTPATIENT
Start: 2019-07-31 | End: 2019-08-15 | Stop reason: SDUPTHER

## 2019-07-31 RX ORDER — ASPIRIN 81 MG/1
TABLET ORAL
Qty: 90 TAB | Refills: 1 | OUTPATIENT
Start: 2019-07-31

## 2019-07-31 NOTE — TELEPHONE ENCOUNTER
appt scheduled for August 13 at 330 with Dr Kodi Morales  Pt is out of medication  Please send in short refill until she can be seen   Thank you

## 2019-08-02 DIAGNOSIS — E78.2 MIXED HYPERLIPIDEMIA: ICD-10-CM

## 2019-08-02 RX ORDER — LOVASTATIN 20 MG/1
TABLET ORAL
Qty: 90 TAB | Refills: 1 | Status: SHIPPED | OUTPATIENT
Start: 2019-08-02 | End: 2020-02-11

## 2019-08-07 DIAGNOSIS — I10 ESSENTIAL HYPERTENSION WITH GOAL BLOOD PRESSURE LESS THAN 140/90: ICD-10-CM

## 2019-08-08 RX ORDER — METOPROLOL TARTRATE 50 MG/1
TABLET ORAL
Qty: 180 TAB | Refills: 1 | Status: SHIPPED | OUTPATIENT
Start: 2019-08-08 | End: 2019-12-05 | Stop reason: SDUPTHER

## 2019-08-15 ENCOUNTER — OFFICE VISIT (OUTPATIENT)
Dept: FAMILY MEDICINE CLINIC | Age: 54
End: 2019-08-15

## 2019-08-15 VITALS
SYSTOLIC BLOOD PRESSURE: 148 MMHG | HEART RATE: 68 BPM | DIASTOLIC BLOOD PRESSURE: 86 MMHG | WEIGHT: 143.4 LBS | HEIGHT: 57 IN | OXYGEN SATURATION: 98 % | BODY MASS INDEX: 30.94 KG/M2 | RESPIRATION RATE: 20 BRPM | TEMPERATURE: 98.6 F

## 2019-08-15 DIAGNOSIS — F33.0 MILD EPISODE OF RECURRENT MAJOR DEPRESSIVE DISORDER (HCC): ICD-10-CM

## 2019-08-15 DIAGNOSIS — I10 ESSENTIAL HYPERTENSION WITH GOAL BLOOD PRESSURE LESS THAN 140/90: ICD-10-CM

## 2019-08-15 RX ORDER — ASPIRIN 81 MG/1
81 TABLET ORAL DAILY
Qty: 30 TAB | Refills: 5 | Status: SHIPPED | OUTPATIENT
Start: 2019-08-15 | End: 2020-04-27 | Stop reason: SDUPTHER

## 2019-08-15 RX ORDER — HYDROCHLOROTHIAZIDE 25 MG/1
25 TABLET ORAL DAILY
Qty: 30 TAB | Refills: 5 | Status: SHIPPED | OUTPATIENT
Start: 2019-08-15 | End: 2020-05-18

## 2019-08-15 RX ORDER — CITALOPRAM 10 MG/1
10 TABLET ORAL DAILY
Qty: 30 TAB | Refills: 5 | Status: SHIPPED | OUTPATIENT
Start: 2019-08-15 | End: 2020-05-20 | Stop reason: DRUGHIGH

## 2019-08-15 RX ORDER — SPIRONOLACTONE 25 MG/1
25 TABLET ORAL DAILY
Qty: 30 TAB | Refills: 5 | Status: SHIPPED | OUTPATIENT
Start: 2019-08-15 | End: 2020-04-20

## 2019-08-15 NOTE — PROGRESS NOTES
1. Have you been to the ER, urgent care clinic since your last visit? Hospitalized since your last visit? No    2. Have you seen or consulted any other health care providers outside of the [address] [place] [address] since your last visit? Include any pap smears or colon screening. No  Reviewed record in preparation for visit and have necessary documentation  Pt did not bring medication to office visit for review  Information was given to pt on Advanced Directives, Living Will  Information was given on Shingles Vaccine  opportunity was given for questions  Goals that were addressed and/or need to be completed during or after this appointment include     Chief Complaint   Patient presents with    Medication Refill     Body mass index is 31.03 kg/m².   Health Maintenance Due   Topic Date Due    DTaP/Tdap/Td series (1 - Tdap) 06/16/1984    PAP AKA CERVICAL CYTOLOGY  01/21/2012    Shingrix Vaccine Age 50> (1 of 2) 06/16/2013    BREAST CANCER SCRN MAMMOGRAM  06/16/2013    FOBT Q 1 YEAR AGE 50-75  05/22/2019    Influenza Age 9 to Adult  08/01/2019

## 2019-08-15 NOTE — PROGRESS NOTES
[facility]    Subjective:   Omar Sun is a 62 y.o. female with history of HTN, HLD, Vit D deficiency, depression and anxiety  CC: Medication refill  History provided by patient     HPI:    Patient presents to clinic today for medication refills. She takes HCTZ, metoprolol, lisinopril and Spironolactone. She checks BP at home occasionally or daily if she is worried it is elevated. She states that she has not taken her BP medications today because she has been busy. Moreover, she reports not taking HCTZ 25mg daily for ~1 month now, because she was told that insurance would not pay for it until the end of the month of August. On average, patient will miss taking her prescriptions 1-2x/week due to busy schedule per her report. Of note, patient is also taking Celexa 10mg daily and took last pill 2 days ago. So she is requesting refill on Rx as well. No other acute medical complaint. Current Outpatient Medications on File Prior to Visit   Medication Sig Dispense Refill    metoprolol tartrate (LOPRESSOR) 50 mg tablet TAKE 1 TABLET BY MOUTH TWICE DAILY 180 Tab 1    lovastatin (MEVACOR) 20 mg tablet TAKE ONE TABLET BY MOUTH NIGHTLY 90 Tab 1    lisinopril (PRINIVIL, ZESTRIL) 40 mg tablet Take 1 Tab by mouth daily. 90 Tab 1    naproxen (NAPROSYN) 500 mg tablet Take 1 Tab by mouth two (2) times daily (with meals). 20 Tab 1    MULTIVITAMIN WITH MINERALS (ONE-A-DAY 50 PLUS PO) Take  by mouth.  cholecalciferol, vitamin D3, (VITAMIN D3) 2,000 unit tab Take  by mouth. No current facility-administered medications on file prior to visit.         Patient Active Problem List   Diagnosis Code    Hypertension I10    Depression F32.9    Anxiety F41.9    Vitamin D deficiency E55.9    Hyperlipidemia E78.5    GERD (gastroesophageal reflux disease) K21.9    Partial edentulism K08.409       Social History     Socioeconomic History    Marital status: SINGLE     Spouse name: Not on file  Number of children: Not on file    Years of education: Not on file    Highest education level: Not on file   Occupational History    Not on file   Social Needs    Financial resource strain: Not on file    Food insecurity:     Worry: Not on file     Inability: Not on file    Transportation needs:     Medical: Not on file     Non-medical: Not on file   Tobacco Use    Smoking status: Never Smoker    Smokeless tobacco: Never Used   Substance and Sexual Activity    Alcohol use: Yes     Comment: 2 beers every other day    Drug use: No    Sexual activity: Yes   Lifestyle    Physical activity:     Days per week: Not on file     Minutes per session: Not on file    Stress: Not on file   Relationships    Social connections:     Talks on phone: Not on file     Gets together: Not on file     Attends Sikhism service: Not on file     Active member of club or organization: Not on file     Attends meetings of clubs or organizations: Not on file     Relationship status: Not on file    Intimate partner violence:     Fear of current or ex partner: Not on file     Emotionally abused: Not on file     Physically abused: Not on file     Forced sexual activity: Not on file   Other Topics Concern    Not on file   Social History Narrative    Not on file       Review of Systems   Constitutional: Negative for chills and fever. HENT: Negative for congestion. Eyes: Negative for blurred vision. Respiratory: Negative for cough. Cardiovascular: Negative for chest pain and palpitations. Gastrointestinal: Negative for abdominal pain, nausea and vomiting. Genitourinary: Negative for dysuria. Musculoskeletal: Negative for myalgias. Skin: Negative for rash. Neurological: Negative for dizziness and headaches. Psychiatric/Behavioral: Negative for suicidal ideas.          Objective:     Visit Vitals  /86   Pulse 68   Temp 98.6 °F (37 °C) (Oral)   Resp 20   Ht 4' 9\" (1.448 m)   Wt 143 lb 6.4 oz (65 kg)   SpO2 98%   BMI 31.03 kg/m²        Physical Exam   Constitutional: She is oriented to person, place, and time. She appears well-developed and well-nourished. No distress. HENT:   Head: Normocephalic and atraumatic. Eyes: Conjunctivae and EOM are normal.   Neck: Normal range of motion. Neck supple. Cardiovascular: Normal rate, regular rhythm and normal heart sounds. Pulmonary/Chest: Effort normal and breath sounds normal.   Abdominal: Soft. Bowel sounds are normal. She exhibits no distension. There is no tenderness. Musculoskeletal: Normal range of motion. She exhibits no edema. Neurological: She is alert and oriented to person, place, and time. Skin: Skin is warm. No erythema. Psychiatric: She has a normal mood and affect. Her behavior is normal. Thought content normal.       Assessment and orders:     Pt is 54yro F who presents to clinic for medication refills. ICD-10-CM ICD-9-CM    1. Essential hypertension with goal blood pressure less than 140/90 I10 401.9 aspirin delayed-release 81 mg tablet      hydroCHLOROthiazide (HYDRODIURIL) 25 mg tablet      spironolactone (ALDACTONE) 25 mg tablet   2. Mild episode of recurrent major depressive disorder ([facility]) F33.0 296.31 citalopram (CELEXA) 10 mg tablet     Diagnoses and all orders for this visit:    1. Essential hypertension with goal blood pressure less than 140/90  BP elevated at this visit, likely due to non-compliance to therapy and not taking HCTZ for the past month. Counseled on importance of compliance to medication. Will refill meds and f/u in a week for BP recheck. -     aspirin delayed-release 81 mg tablet; Take 1 Tab by mouth daily. -     hydroCHLOROthiazide (HYDRODIURIL) 25 mg tablet; Take 1 Tab by mouth daily. -     spironolactone (ALDACTONE) 25 mg tablet; Take 1 Tab by mouth daily. 2. Mild episode of recurrent major [facility]  Medication refilled. Denies SI/HI. -     citalopram (CELEXA) 10 mg tablet;  Take 1 Tab by mouth daily.      Follow-up and Dispositions    · Return in about 1 week (around 8/22/2019) for follow-up with Dr. Ras Perla for PAP smear. I have reviewed patient medical and social history and medications. I have reviewed pertinent labs results and other data. I have discussed the diagnosis with the patient and the intended plan as seen in the above orders. The patient has received an after-visit summary and questions were answered concerning future plans. I have discussed medication side effects and warnings with the patient as well.     Bakari Reyna MD  Resident [facility]  08/18/19

## 2019-09-14 RX ORDER — NAPROXEN 500 MG/1
TABLET ORAL
Qty: 20 TAB | Refills: 1 | Status: SHIPPED | OUTPATIENT
Start: 2019-09-14 | End: 2020-05-29 | Stop reason: SDUPTHER

## 2019-12-05 ENCOUNTER — OFFICE VISIT (OUTPATIENT)
Dept: FAMILY MEDICINE CLINIC | Age: 54
End: 2019-12-05

## 2019-12-05 ENCOUNTER — HOSPITAL ENCOUNTER (OUTPATIENT)
Dept: LAB | Age: 54
Discharge: HOME | End: 2019-12-05

## 2019-12-05 VITALS
DIASTOLIC BLOOD PRESSURE: 85 MMHG | RESPIRATION RATE: 22 BRPM | WEIGHT: 137.2 LBS | SYSTOLIC BLOOD PRESSURE: 136 MMHG | HEART RATE: 94 BPM | HEIGHT: 57 IN | BODY MASS INDEX: 29.6 KG/M2 | OXYGEN SATURATION: 97 % | TEMPERATURE: 97.5 F

## 2019-12-05 DIAGNOSIS — F41.9 ANXIETY: ICD-10-CM

## 2019-12-05 DIAGNOSIS — E78.5 HYPERLIPIDEMIA, UNSPECIFIED HYPERLIPIDEMIA TYPE: ICD-10-CM

## 2019-12-05 DIAGNOSIS — I10 ESSENTIAL HYPERTENSION WITH GOAL BLOOD PRESSURE LESS THAN 140/90: ICD-10-CM

## 2019-12-05 DIAGNOSIS — F41.0 PANIC ATTACKS: Primary | ICD-10-CM

## 2019-12-05 DIAGNOSIS — E55.9 VITAMIN D DEFICIENCY: ICD-10-CM

## 2019-12-05 DIAGNOSIS — F41.0 PANIC ATTACKS: ICD-10-CM

## 2019-12-05 RX ORDER — METOPROLOL TARTRATE 50 MG/1
TABLET ORAL
Qty: 180 TAB | Refills: 1 | Status: SHIPPED | OUTPATIENT
Start: 2019-12-05 | End: 2021-01-26 | Stop reason: SDUPTHER

## 2019-12-05 RX ORDER — HYDROXYZINE 50 MG/1
50 TABLET, FILM COATED ORAL
Qty: 30 TAB | Refills: 0 | Status: SHIPPED | OUTPATIENT
Start: 2019-12-05 | End: 2021-10-19

## 2019-12-05 NOTE — PROGRESS NOTES
[facility]    Subjective:   Lexis Foster is a 62 y.o. female with history of HTN, HLD, Vit D deficiency, depression and anxiety  CC: Panic attacks  History provided by patient     HPI:    Patient presents to clinic with complaint of panic attacks. She states that she was at home a couple of days ago, when she had a panic attacks after learning that her son was being rushed to the hospital due to PNA and newly-diagnosed lung cancer. At that time, she experienced palpitations and chest pressure. Patient took Celexa and Mylenta without much improvement, although Sx self-resolved. Moreover, patient admits that she has been taking Celexa prn as she thought she should only take it whenever she needs it. Pt currently denies chest pain and palpitations, but endorses residual chest pressure just about 1-2 hours ago, located substernally. Denies  fever, chills, N/V, abdominal pain, diarrhea, or dysuria. She reports low appetite with current life stressors. Of note, patient mentioned that she ran out of metoprolol pills x 1 week. Current Outpatient Medications on File Prior to Visit   Medication Sig Dispense Refill    naproxen (NAPROSYN) 500 mg tablet TAKE 1 TABLET BY MOUTH TWICE DAILY WITH  MEALS 20 Tab 1    aspirin delayed-release 81 mg tablet Take 1 Tab by mouth daily. 30 Tab 5    citalopram (CELEXA) 10 mg tablet Take 1 Tab by mouth daily. 30 Tab 5    hydroCHLOROthiazide (HYDRODIURIL) 25 mg tablet Take 1 Tab by mouth daily. 30 Tab 5    spironolactone (ALDACTONE) 25 mg tablet Take 1 Tab by mouth daily. 30 Tab 5    lovastatin (MEVACOR) 20 mg tablet TAKE ONE TABLET BY MOUTH NIGHTLY 90 Tab 1    lisinopril (PRINIVIL, ZESTRIL) 40 mg tablet Take 1 Tab by mouth daily. 90 Tab 1    MULTIVITAMIN WITH MINERALS (ONE-A-DAY 50 PLUS PO) Take  by mouth.  cholecalciferol, vitamin D3, (VITAMIN D3) 2,000 unit tab Take  by mouth.        No current facility-administered medications on file prior to visit. Patient Active Problem List   Diagnosis Code    Hypertension I10    Depression F32.9    Anxiety F41.9    Vitamin D deficiency E55.9    Hyperlipidemia E78.5    GERD (gastroesophageal reflux disease) K21.9    Partial edentulism K08.409       Social History     Socioeconomic History    Marital status: SINGLE     Spouse name: Not on file    Number of children: Not on file    Years of education: Not on file    Highest education level: Not on file   Occupational History    Not on file   Social Needs    Financial resource strain: Not on file    Food insecurity:     Worry: Not on file     Inability: Not on file    Transportation needs:     Medical: Not on file     Non-medical: Not on file   Tobacco Use    Smoking status: Never Smoker    Smokeless tobacco: Never Used   Substance and Sexual Activity    Alcohol use: Yes     Comment: 2 beers every other day    Drug use: No    Sexual activity: Yes   Lifestyle    Physical activity:     Days per week: Not on file     Minutes per session: Not on file    Stress: Not on file   Relationships    Social connections:     Talks on phone: Not on file     Gets together: Not on file     Attends Anabaptist service: Not on file     Active member of club or organization: Not on file     Attends meetings of clubs or organizations: Not on file     Relationship status: Not on file    Intimate partner violence:     Fear of current or ex partner: Not on file     Emotionally abused: Not on file     Physically abused: Not on file     Forced sexual activity: Not on file   Other Topics Concern    Not on file   Social History Narrative    Not on file       Review of Systems   Constitutional: Negative for chills and fever. HENT: Negative for congestion. Eyes: Negative for blurred vision. Respiratory: Negative for cough. Cardiovascular: Negative for chest pain and palpitations.         +residual chest pressure   Gastrointestinal: Negative for nausea and vomiting. Genitourinary: Negative for dysuria. Musculoskeletal: Negative for myalgias. Skin: Negative for rash. Neurological: Negative for dizziness and headaches. Psychiatric/Behavioral: The patient is nervous/anxious. Objective:     Visit Vitals  /85 (BP 1 Location: Left arm, BP Patient Position: Sitting)   Pulse 94   Temp 97.5 °F (36.4 °C) (Oral)   Resp 22   Ht 4' 9\" (1.448 m)   Wt 137 lb 3.2 oz (62.2 kg)   SpO2 97%   BMI 29.69 kg/m²        Physical Exam  Constitutional:       General: She is not in acute distress. Appearance: Normal appearance. HENT:      Head: Normocephalic and atraumatic. Right Ear: External ear normal.      Left Ear: External ear normal.      Nose: Nose normal.      Mouth/Throat:      Mouth: Mucous membranes are moist.      Pharynx: No oropharyngeal exudate or posterior oropharyngeal erythema. Eyes:      Extraocular Movements: Extraocular movements intact. Conjunctiva/sclera: Conjunctivae normal.      Pupils: Pupils are equal, round, and reactive to light. Neck:      Musculoskeletal: Normal range of motion and neck supple. Cardiovascular:      Rate and Rhythm: Normal rate and regular rhythm. Pulses: Normal pulses. Heart sounds: Normal heart sounds. Pulmonary:      Effort: Pulmonary effort is normal.      Breath sounds: Normal breath sounds. Chest:      Chest wall: No tenderness. Abdominal:      General: Bowel sounds are normal. There is no distension. Palpations: Abdomen is soft. Tenderness: There is no tenderness. Musculoskeletal: Normal range of motion. Skin:     General: Skin is warm. Capillary Refill: Capillary refill takes less than 2 seconds. Neurological:      Mental Status: She is alert. Mental status is at baseline. Psychiatric:         Thought Content:  Thought content normal.      Comments: Anxious mood           Assessment and orders:     Pt is 61yro F who presents to clinic for management of anxiety and panic attacks. ICD-10-CM ICD-9-CM    1. Panic attacks F41.0 300.01 hydrOXYzine HCl (ATARAX) 50 mg tablet      METABOLIC PANEL, BASIC      TSH 3RD GENERATION      T4, FREE      HGB & HCT      AMB POC EKG ROUTINE W/ 12 LEADS, INTER & REP   2. Anxiety F41.9 300.00    3. Hyperlipidemia, unspecified hyperlipidemia type E78.5 272.4 LIPID PANEL   4. Vitamin D deficiency E55.9 268.9 VITAMIN D, 25 HYDROXY   5. Essential hypertension with goal blood pressure less than 140/90 I10 401.9 metoprolol tartrate (LOPRESSOR) 50 mg tablet     Diagnoses and all orders for this visit:    1. Panic attacks  EKG personally reviewed and interpreted: normal sinus rhythm, no ST elevated and QTc 431 msec. So Sx likely due to panic attacks, and not cardiac in etiology. Will initiate therapy with Atarax prn for occasional panic attacks and reiterated that this medication is only taken PRN. She expressed understanding. Will also f/u on labwork. -     hydrOXYzine HCl (ATARAX) 50 mg tablet; Take 1 Tab by mouth three (3) times daily as needed for Itching.  -     METABOLIC PANEL, BASIC; Future  -     TSH 3RD GENERATION; Future  -     T4, FREE; Future  -     HGB & HCT; Future  -     AMB POC EKG ROUTINE W/ 12 LEADS, INTER & REP    2. Anxiety  Pt has been taking Celexa as needed. Counseled on proper medication use, and reiterated need to take it DAILY in order for Rx to be effective. She expresses understanding of plan. Will c/o to monitor. 3. Hyperlipidemia, unspecified hyperlipidemia type  Will f/u on labwork result. Pt is on statin. -     LIPID PANEL; Future    4. Vitamin D deficiency  Will f/u lab result. -     VITAMIN D, 25 HYDROXY; Future    5. Essential hypertension with goal blood pressure less than 140/90  BP at goal at this visit. Will refill Lopressor as requested.    -     metoprolol tartrate (LOPRESSOR) 50 mg tablet; TAKE 1 TABLET BY MOUTH TWICE DAILY      Follow-up and Dispositions    · Return in about 4 weeks (around 1/2/2020) for follow-up on anxiety. I have reviewed patient medical and social history and medications. I have reviewed pertinent labs results and other data. I have discussed the diagnosis with the patient and the intended plan as seen in the above orders. The patient has received an after-visit summary and questions were answered concerning future plans. I have discussed medication side effects and warnings with the patient as well.     Chapin Tejada MD  Resident [facility]  12/09/19

## 2019-12-05 NOTE — PROGRESS NOTES
I discussed the findings, assessment and plan in detail with the resident and agree with the resident's findings and plan as documented in the resident's note. Educated on proper use of celexa. Quentin Litter Karyle Raker, M.D.

## 2019-12-05 NOTE — PROGRESS NOTES
1. Have you been to the ER, urgent care clinic since your last visit? Hospitalized since your last visit? No    2. Have you seen or consulted any other health care providers outside of the [address] [place] [address] since your last visit? Include any pap smears or colon screening. No    Reviewed record in preparation for visit and have necessary documentation  Goals that were addressed and/or need to be completed during or after this appointment include     Health Maintenance Due   Topic Date Due    DTaP/Tdap/Td series (1 - Tdap) 06/16/1974    PAP AKA CERVICAL CYTOLOGY  01/21/2012    Shingrix Vaccine Age 50> (1 of 2) 06/16/2013    BREAST CANCER SCRN MAMMOGRAM  06/16/2013    FOBT Q 1 YEAR AGE 50-75  05/22/2019    Influenza Age 5 to Adult  08/01/2019       Patient is accompanied by self I have received verbal consent from Senthil Alford to discuss any/all medical information while they are present in the room.

## 2019-12-06 LAB
25(OH)D3 SERPL-MCNC: <9 NG/ML (ref 30–100)
ANION GAP SERPL CALC-SCNC: 8 MMOL/L (ref 5–15)
BUN SERPL-MCNC: 16 MG/DL (ref 6–20)
BUN/CREAT SERPL: 15 (ref 12–20)
CALCIUM SERPL-MCNC: 9.8 MG/DL (ref 8.5–10.1)
CHLORIDE SERPL-SCNC: 100 MMOL/L (ref 97–108)
CHOLEST SERPL-MCNC: 187 MG/DL
CO2 SERPL-SCNC: 25 MMOL/L (ref 21–32)
CREAT SERPL-MCNC: 1.05 MG/DL (ref 0.55–1.02)
GLUCOSE SERPL-MCNC: 109 MG/DL (ref 65–100)
HCT VFR BLD AUTO: 40.1 % (ref 35–47)
HDLC SERPL-MCNC: 84 MG/DL
HDLC SERPL: 2.2 {RATIO} (ref 0–5)
HGB BLD-MCNC: 12 G/DL (ref 11.5–16)
LDLC SERPL CALC-MCNC: 84.2 MG/DL (ref 0–100)
LIPID PROFILE,FLP: NORMAL
POTASSIUM SERPL-SCNC: 4.6 MMOL/L (ref 3.5–5.1)
SODIUM SERPL-SCNC: 133 MMOL/L (ref 136–145)
T4 FREE SERPL-MCNC: 1.1 NG/DL (ref 0.8–1.5)
TRIGL SERPL-MCNC: 94 MG/DL (ref ?–150)
TSH SERPL DL<=0.05 MIU/L-ACNC: 1.76 UIU/ML (ref 0.36–3.74)
VLDLC SERPL CALC-MCNC: 18.8 MG/DL

## 2019-12-13 DIAGNOSIS — E55.9 VITAMIN D DEFICIENCY: Primary | ICD-10-CM

## 2019-12-13 RX ORDER — ERGOCALCIFEROL 1.25 MG/1
50000 CAPSULE ORAL
Qty: 10 CAP | Refills: 0 | Status: SHIPPED | OUTPATIENT
Start: 2019-12-13 | End: 2020-05-20 | Stop reason: SDUPTHER

## 2019-12-13 NOTE — PROGRESS NOTES
Pt with significant Vit D deficiency. Called and informed patient of all lab results. Will initiate Vit D supplements and repeat labs in ~ 3 months. She is in agreement with plan.

## 2020-01-29 ENCOUNTER — OFFICE VISIT (OUTPATIENT)
Dept: FAMILY MEDICINE CLINIC | Age: 55
End: 2020-01-29

## 2020-01-29 VITALS
TEMPERATURE: 96.9 F | OXYGEN SATURATION: 98 % | RESPIRATION RATE: 18 BRPM | SYSTOLIC BLOOD PRESSURE: 133 MMHG | DIASTOLIC BLOOD PRESSURE: 69 MMHG | HEIGHT: 57 IN | HEART RATE: 71 BPM | BODY MASS INDEX: 30.07 KG/M2 | WEIGHT: 139.4 LBS

## 2020-01-29 DIAGNOSIS — J01.00 ACUTE NON-RECURRENT MAXILLARY SINUSITIS: Primary | ICD-10-CM

## 2020-01-29 DIAGNOSIS — E55.9 VITAMIN D DEFICIENCY: ICD-10-CM

## 2020-01-29 RX ORDER — AMOXICILLIN AND CLAVULANATE POTASSIUM 500; 125 MG/1; MG/1
1 TABLET, FILM COATED ORAL 2 TIMES DAILY
Qty: 14 TAB | Refills: 0 | Status: SHIPPED | OUTPATIENT
Start: 2020-01-29 | End: 2020-02-05

## 2020-01-29 NOTE — PROGRESS NOTES
[facility]    History of Present Illness:   Diamond Levy is a 62 y.o. female with history of GERD, Depression, HLD, HTN  CC: Sinus symptoms  History provided by patient and Records    HPI:  Sinusitis: Patient presents with sinusitis symptoms over the last 3 weeks. Patient reports symptoms including nasal congestion, purulent rhinorrhea, cough, frequent clearing of the throat, headaches and denies fevers, sore throats, facial pain, posttussive emesis. Patient describes no current pain sinus(es). Patient does report Viral URI prior to developing these symptoms. Previous OTC treatments include Robitussin/Nyquil which have been minimally effective in treating symptoms. she denies recent sick contacts. she does not have history of recurrent sinusitis. - Last antibiotic use: None    Vitamin D: Currently taking Vitamin D and has had improvement with fatigue initially besides recent illness. Health Maintenance  Health Maintenance Due   Topic Date Due    DTaP/Tdap/Td series (1 - Tdap) 06/16/1974    PAP AKA CERVICAL CYTOLOGY  01/21/2012    Shingrix Vaccine Age 50> (1 of 2) 06/16/2013    BREAST CANCER SCRN MAMMOGRAM  06/16/2013    FOBT Q 1 YEAR AGE 50-75  05/22/2019    Influenza Age 5 to Adult  08/01/2019       Past Medical, Family, and Social History:     Current Outpatient Medications on File Prior to Visit   Medication Sig Dispense Refill    ergocalciferol (ERGOCALCIFEROL) 50,000 unit capsule Take 1 Cap by mouth every seven (7) days. 10 Cap 0    hydrOXYzine HCl (ATARAX) 50 mg tablet Take 1 Tab by mouth three (3) times daily as needed for Itching. 30 Tab 0    metoprolol tartrate (LOPRESSOR) 50 mg tablet TAKE 1 TABLET BY MOUTH TWICE DAILY 180 Tab 1    naproxen (NAPROSYN) 500 mg tablet TAKE 1 TABLET BY MOUTH TWICE DAILY WITH  MEALS 20 Tab 1    aspirin delayed-release 81 mg tablet Take 1 Tab by mouth daily. 30 Tab 5    citalopram (CELEXA) 10 mg tablet Take 1 Tab by mouth daily. 30 Tab 5    hydroCHLOROthiazide (HYDRODIURIL) 25 mg tablet Take 1 Tab by mouth daily. 30 Tab 5    spironolactone (ALDACTONE) 25 mg tablet Take 1 Tab by mouth daily. 30 Tab 5    lovastatin (MEVACOR) 20 mg tablet TAKE ONE TABLET BY MOUTH NIGHTLY 90 Tab 1    lisinopril (PRINIVIL, ZESTRIL) 40 mg tablet Take 1 Tab by mouth daily. 90 Tab 1    MULTIVITAMIN WITH MINERALS (ONE-A-DAY 50 PLUS PO) Take  by mouth.  cholecalciferol, vitamin D3, (VITAMIN D3) 2,000 unit tab Take  by mouth. No current facility-administered medications on file prior to visit.         Patient Active Problem List   Diagnosis Code    Hypertension I10    Depression F32.9    Anxiety F41.9    Vitamin D deficiency E55.9    Hyperlipidemia E78.5    GERD (gastroesophageal reflux disease) K21.9    Partial edentulism K08.409       Social History     Socioeconomic History    Marital status: SINGLE     Spouse name: Not on file    Number of children: Not on file    Years of education: Not on file    Highest education level: Not on file   Occupational History    Not on file   Social Needs    Financial resource strain: Not on file    Food insecurity:     Worry: Not on file     Inability: Not on file    Transportation needs:     Medical: Not on file     Non-medical: Not on file   Tobacco Use    Smoking status: Never Smoker    Smokeless tobacco: Never Used   Substance and Sexual Activity    Alcohol use: Yes     Comment: 2 beers every other day    Drug use: No    Sexual activity: Yes   Lifestyle    Physical activity:     Days per week: Not on file     Minutes per session: Not on file    Stress: Not on file   Relationships    Social connections:     Talks on phone: Not on file     Gets together: Not on file     Attends Jew service: Not on file     Active member of club or organization: Not on file     Attends meetings of clubs or organizations: Not on file     Relationship status: Not on file    Intimate partner violence: Fear of current or ex partner: Not on file     Emotionally abused: Not on file     Physically abused: Not on file     Forced sexual activity: Not on file   Other Topics Concern    Not on file   Social History Narrative    Not on file       Review of Systems   Review of Systems   Constitutional: Positive for malaise/fatigue. Negative for chills and fever. HENT: Positive for congestion. Negative for sinus pain and sore throat. Respiratory: Positive for cough. Negative for sputum production. Gastrointestinal: Negative for abdominal pain, nausea and vomiting. Neurological: Positive for headaches. Objective:     Visit Vitals  /69 (BP 1 Location: Right arm, BP Patient Position: Sitting)   Pulse 71   Temp 96.9 °F (36.1 °C)   Resp 18   Ht 4' 9\" (1.448 m)   Wt 139 lb 6.4 oz (63.2 kg)   SpO2 98%   BMI 30.17 kg/m²        Physical Exam  Vitals signs and nursing note reviewed. Constitutional:       Appearance: Normal appearance. HENT:      Head: Normocephalic and atraumatic. Nose: Congestion present. Mouth/Throat:      Mouth: Mucous membranes are moist.      Pharynx: Oropharynx is clear. Neck:      Musculoskeletal: Normal range of motion. Cardiovascular:      Rate and Rhythm: Normal rate and regular rhythm. Pulses: Normal pulses. Heart sounds: Normal heart sounds. No murmur. No friction rub. No gallop. Pulmonary:      Effort: Pulmonary effort is normal.      Breath sounds: Normal breath sounds. Abdominal:      General: Abdomen is flat. Bowel sounds are normal.      Palpations: Abdomen is soft. Lymphadenopathy:      Cervical: No cervical adenopathy. Neurological:      Mental Status: She is alert. Pertinent Labs/Studies:      Assessment and orders:       ICD-10-CM ICD-9-CM    1. Acute non-recurrent maxillary sinusitis J01.00 461.0 amoxicillin-clavulanate (AUGMENTIN) 500-125 mg per tablet   2.  Vitamin D deficiency E55.9 268.9      Diagnoses and all orders for this visit: 1. Acute non-recurrent maxillary sinusitis: Given history and symptoms, starting empiric antibiotics for bacterial sinusitis. Advised patient on OTC symptomatic therapies for sinusitis and discussed red flag symptoms for which to call or return to clinic. See orders for details. -     amoxicillin-clavulanate (AUGMENTIN) 500-125 mg per tablet; Take 1 Tab by mouth two (2) times a day for 7 days. 2. Vitamin D deficiency:  Taking weekly supplement. Appears to be helping. Follow up in 1-2 months for repeat labs. Follow-up and Dispositions    · Return in about 2 months (around 3/29/2020) for Chronic check up. I have discussed the diagnosis with the patient and the intended plan as seen in the above orders. Social history, medical history, and labs were reviewed. The patient has received an after-visit summary and questions were answered concerning future plans. I have discussed medication side effects and warnings with the patient as well.     MD SHALINI Griffin & CARLOS TSE [facility]  01/29/20

## 2020-01-29 NOTE — PROGRESS NOTES
Chief Complaint   Patient presents with    Cough     with yellow phlegm x2 weeks     Visit Vitals  /69 (BP 1 Location: Right arm, BP Patient Position: Sitting)   Pulse 71   Temp 96.9 °F (36.1 °C)   Resp 18   Ht 4' 9\" (1.448 m)   Wt 139 lb 6.4 oz (63.2 kg)   SpO2 98%   BMI 30.17 kg/m²     1. Have you been to the ER, urgent care clinic since your last visit? Hospitalized since your last visit? No    2. Have you seen or consulted any other health care providers outside of the [address] [place] [address] since your last visit? Include any pap smears or colon screening.  No    Reviewed record in preparation for visit and have necessary documentation  Pt did not bring medication to office visit for review  opportunity was given for questions  Goals that were addressed and/or need to be completed during or after this appointment include   Health Maintenance Due   Topic Date Due    DTaP/Tdap/Td series (1 - Tdap) 06/16/1974    PAP AKA CERVICAL CYTOLOGY  01/21/2012    Shingrix Vaccine Age 50> (1 of 2) 06/16/2013    BREAST CANCER SCRN MAMMOGRAM  06/16/2013    FOBT Q 1 YEAR AGE 50-75  05/22/2019    Influenza Age 9 to Adult  08/01/2019

## 2020-01-29 NOTE — PATIENT INSTRUCTIONS
Sinusitis: Care Instructions Your Care Instructions Sinusitis is an infection of the lining of the sinus cavities in your head. Sinusitis often follows a cold. It causes pain and pressure in your head and face. In most cases, sinusitis gets better on its own in 1 to 2 weeks. But some mild symptoms may last for several weeks. Sometimes antibiotics are needed. Follow-up care is a key part of your treatment and safety. Be sure to make and go to all appointments, and call your doctor if you are having problems. It's also a good idea to know your test results and keep a list of the medicines you take. How can you care for yourself at home? · Take an over-the-counter pain medicine, such as acetaminophen (Tylenol), ibuprofen (Advil, Motrin), or naproxen (Aleve). Read and follow all instructions on the label. · If the doctor prescribed antibiotics, take them as directed. Do not stop taking them just because you feel better. You need to take the full course of antibiotics. · Be careful when taking over-the-counter cold or flu medicines and Tylenol at the same time. Many of these medicines have acetaminophen, which is Tylenol. Read the labels to make sure that you are not taking more than the recommended dose. Too much acetaminophen (Tylenol) can be harmful. · Breathe warm, moist air from a steamy shower, a hot bath, or a sink filled with hot water. Avoid cold, dry air. Using a humidifier in your home may help. Follow the directions for cleaning the machine. · Use saline (saltwater) nasal washes to help keep your nasal passages open and wash out mucus and bacteria. You can buy saline nose drops at a grocery store or drugstore. Or you can make your own at home by adding 1 teaspoon of salt and 1 teaspoon of baking soda to 2 cups of distilled water. If you make your own, fill a bulb syringe with the solution, insert the tip into your nostril, and squeeze gently. Duana Glow your nose. · Put a hot, wet towel or a warm gel pack on your face 3 or 4 times a day for 5 to 10 minutes each time. · Try a decongestant nasal spray like oxymetazoline (Afrin). Do not use it for more than 3 days in a row. Using it for more than 3 days can make your congestion worse. When should you call for help? Call your doctor now or seek immediate medical care if: 
  · You have new or worse swelling or redness in your face or around your eyes.  
  · You have a new or higher fever.  
 Watch closely for changes in your health, and be sure to contact your doctor if: 
  · You have new or worse facial pain.  
  · The mucus from your nose becomes thicker (like pus) or has new blood in it.  
  · You are not getting better as expected. Where can you learn more? Go to http://braden-soto.info/. Enter S611 in the search box to learn more about \"Sinusitis: Care Instructions. \" Current as of: October 21, 2018 Content Version: 12.2 © 3950-4391 [employer]. Care instructions adapted under license by [employer] (which disclaims liability or warranty for this information). If you have questions about a medical condition or this instruction, always ask your healthcare professional. [address] any warranty or liability for your use of this information.

## 2020-02-10 DIAGNOSIS — I10 ESSENTIAL HYPERTENSION: ICD-10-CM

## 2020-02-11 DIAGNOSIS — E78.2 MIXED HYPERLIPIDEMIA: ICD-10-CM

## 2020-02-11 RX ORDER — LISINOPRIL 40 MG/1
40 TABLET ORAL DAILY
Qty: 90 TAB | Refills: 0 | Status: SHIPPED | OUTPATIENT
Start: 2020-02-11 | End: 2020-06-07

## 2020-02-11 RX ORDER — LOVASTATIN 20 MG/1
TABLET ORAL
Qty: 90 TAB | Refills: 0 | Status: SHIPPED | OUTPATIENT
Start: 2020-02-11 | End: 2020-05-29 | Stop reason: SDUPTHER

## 2020-02-27 DIAGNOSIS — E55.9 VITAMIN D DEFICIENCY: Primary | ICD-10-CM

## 2020-02-27 DIAGNOSIS — E55.9 VITAMIN D DEFICIENCY: ICD-10-CM

## 2020-02-27 RX ORDER — ERGOCALCIFEROL 1.25 MG/1
50000 CAPSULE ORAL
Qty: 10 CAP | Refills: 0 | OUTPATIENT
Start: 2020-02-27

## 2020-02-27 RX ORDER — CHOLECALCIFEROL (VITAMIN D3) 125 MCG
2000 CAPSULE ORAL DAILY
Qty: 90 TAB | Refills: 1 | Status: SHIPPED | OUTPATIENT
Start: 2020-02-27 | End: 2020-04-15

## 2020-04-15 ENCOUNTER — VIRTUAL VISIT (OUTPATIENT)
Dept: FAMILY MEDICINE CLINIC | Age: 55
End: 2020-04-15

## 2020-04-15 DIAGNOSIS — F41.9 ANXIETY: ICD-10-CM

## 2020-04-15 DIAGNOSIS — K21.9 GASTROESOPHAGEAL REFLUX DISEASE WITHOUT ESOPHAGITIS: ICD-10-CM

## 2020-04-15 DIAGNOSIS — F33.42 RECURRENT MAJOR DEPRESSIVE DISORDER, IN FULL REMISSION (HCC): ICD-10-CM

## 2020-04-15 DIAGNOSIS — I10 ESSENTIAL HYPERTENSION: Primary | ICD-10-CM

## 2020-04-15 DIAGNOSIS — E78.2 MIXED HYPERLIPIDEMIA: ICD-10-CM

## 2020-04-15 DIAGNOSIS — E55.9 VITAMIN D DEFICIENCY: ICD-10-CM

## 2020-04-15 NOTE — PROGRESS NOTES
Sedrick Malik is a 62 y.o. female evaluated via Telephone on 04/15/20. Patient Identity confirmed by . Consent:  He and/or health care decision maker is aware that that he may receive a bill for this telephone service, depending on his insurance coverage, and has provided verbal consent to proceed: Yes    Physician Location: Office  Patient Location: Home  Nurse Assisting with Encounter: Charmaine Castro LPN    Chief Complaint   Patient presents with    Follow Up Chronic Condition      Information gathered from patient and/or health care decision maker. HPI:   Hypertension Follow up:  Currently Taking Lisinopril 40 mg, Metoprolol 50 mg BID, Spironolactone 25 mg daily, HCTZ 25 mg. The patient reports:  taking medications as instructed, no medication side effects noted, home BP monitoring in range of 044-906'D systolic over 36'G diastolic, no TIA's, no chest pain on exertion, no dyspnea on exertion, no swelling of ankles. BP Readings from Last 3 Encounters:   20 133/69   19 136/85   08/15/19 148/86      Hypertriglyceridemia Follow up:   Cardiovascular risks for her are: hypertension, hyperlipidemia. Currently she takes Mevacor (lovastatin) , 20 mg. Myalgias: NO   Fatigue: NO   Other side effects: NO     Wt Readings from Last 3 Encounters:   20 139 lb 6.4 oz (63.2 kg)   19 137 lb 3.2 oz (62.2 kg)   08/15/19 143 lb 6.4 oz (65 kg)        Vitamin D deficiency: Takes weekly 50,000 unit Pill. Anxiety/Depression: Also with Grief from son's death in January, is taking Hydroxyzine PRN and is taking Celexa, though not taking daily. Acid Reflux: Noting some \"acid in my throat\" occasionally. Generally occurs with acid-containing foods (Tomatoes). Asking about OTC medications. Has been using baking soda. Review of Systems   Constitutional: Negative for chills and fever. Cardiovascular: Negative for chest pain and palpitations. Gastrointestinal: Positive for heartburn. Current Outpatient Medications:     lisinopril (PRINIVIL, ZESTRIL) 40 mg tablet, Take 1 Tab by mouth daily. , Disp: 90 Tab, Rfl: 0    lovastatin (MEVACOR) 20 mg tablet, TAKE 1 TABLET BY MOUTH NIGHTLY, Disp: 90 Tab, Rfl: 0    ergocalciferol (ERGOCALCIFEROL) 50,000 unit capsule, Take 1 Cap by mouth every seven (7) days. , Disp: 10 Cap, Rfl: 0    hydrOXYzine HCl (ATARAX) 50 mg tablet, Take 1 Tab by mouth three (3) times daily as needed for Itching., Disp: 30 Tab, Rfl: 0    metoprolol tartrate (LOPRESSOR) 50 mg tablet, TAKE 1 TABLET BY MOUTH TWICE DAILY, Disp: 180 Tab, Rfl: 1    naproxen (NAPROSYN) 500 mg tablet, TAKE 1 TABLET BY MOUTH TWICE DAILY WITH  MEALS, Disp: 20 Tab, Rfl: 1    aspirin delayed-release 81 mg tablet, Take 1 Tab by mouth daily. , Disp: 30 Tab, Rfl: 5    citalopram (CELEXA) 10 mg tablet, Take 1 Tab by mouth daily. , Disp: 30 Tab, Rfl: 5    hydroCHLOROthiazide (HYDRODIURIL) 25 mg tablet, Take 1 Tab by mouth daily. , Disp: 30 Tab, Rfl: 5    spironolactone (ALDACTONE) 25 mg tablet, Take 1 Tab by mouth daily. , Disp: 30 Tab, Rfl: 5    MULTIVITAMIN WITH MINERALS (ONE-A-DAY 50 PLUS PO), Take  by mouth., Disp: , Rfl:      No Known Allergies     Patient Active Problem List    Diagnosis Date Noted    Partial edentulism 12/23/2016    Hyperlipidemia 12/30/2011    GERD (gastroesophageal reflux disease) 12/30/2011    Vitamin D deficiency 01/31/2011    Depression 08/30/2010    Anxiety 08/30/2010    Hypertension         Health Maintenance Due   Topic Date Due    DTaP/Tdap/Td series (1 - Tdap) 06/16/1984    PAP AKA CERVICAL CYTOLOGY  01/21/2012    Shingrix Vaccine Age 50> (1 of 2) 06/16/2013    Breast Cancer Screen Mammogram  06/16/2013    FOBT Q1Y Age 50-75  05/22/2019        Assessment/Plan:  Details of this discussion including any medical advice provided: Chronic conditions are well controlled at this time. No refills needed currently.   Advised on using Famotidine for Acid reflux and foods to avoid. Due for labs in 2-3 months. ICD-10-CM ICD-9-CM    1. Essential hypertension I10 401.9    2. Gastroesophageal reflux disease without esophagitis K21.9 530.81    3. Vitamin D deficiency E55.9 268.9    4. Mixed hyperlipidemia E78.2 272.2    5. Recurrent major depressive disorder, in full remission ([facility] [address] F33.42 296.36    6. Anxiety F41.9 300.00      Follow-up and Dispositions    · Return in about 3 months (around 7/15/2020). Total Time: minutes: 11-20 minutes was spent on telemedicine encounter discussing above problems and plans. Patient Problem list, medications, and Allergies were reviewed during this encounter. Pursuant to the emergency declaration under the [address] [place] [address], [facility] waiver authority and the [employer] Act, this Telephone Visit was conducted, with patient's consent, to reduce the patient's risk of exposure to COVID-19 and provide continuity of care for an established patient. I affirm this is a Patient Initiated Episode with an Established Patient who has not had a related appointment within my department in the past 7 days or scheduled within the next 24 hours. Discussed diagnoses in detail with patient. Medication risks/benefits/side effects discussed with patient. All of the patient's questions were addressed. The patient understands and agrees with our plan of care. The patient knows to call back if they are unsure of or forget any changes we discussed today or if the symptoms change.     Note: not billable if this call serves to triage the patient into an appointment for the relevant concern    MD SHALINI Daniel & CARLOS TSE [facility]  04/15/20

## 2020-04-27 DIAGNOSIS — I10 ESSENTIAL HYPERTENSION WITH GOAL BLOOD PRESSURE LESS THAN 140/90: ICD-10-CM

## 2020-04-27 RX ORDER — ASPIRIN 81 MG/1
TABLET ORAL
Qty: 30 TAB | Refills: 0 | Status: SHIPPED | OUTPATIENT
Start: 2020-04-27 | End: 2020-05-29 | Stop reason: SDUPTHER

## 2020-05-17 DIAGNOSIS — I10 ESSENTIAL HYPERTENSION WITH GOAL BLOOD PRESSURE LESS THAN 140/90: ICD-10-CM

## 2020-05-18 RX ORDER — HYDROCHLOROTHIAZIDE 25 MG/1
TABLET ORAL
Qty: 30 TAB | Refills: 0 | Status: SHIPPED | OUTPATIENT
Start: 2020-05-18 | End: 2020-05-20 | Stop reason: SDUPTHER

## 2020-05-20 ENCOUNTER — OFFICE VISIT (OUTPATIENT)
Dept: FAMILY MEDICINE CLINIC | Age: 55
End: 2020-05-20

## 2020-05-20 ENCOUNTER — HOSPITAL ENCOUNTER (OUTPATIENT)
Dept: LAB | Age: 55
Discharge: HOME | End: 2020-05-20

## 2020-05-20 VITALS
TEMPERATURE: 98.1 F | RESPIRATION RATE: 18 BRPM | BODY MASS INDEX: 29.99 KG/M2 | SYSTOLIC BLOOD PRESSURE: 109 MMHG | DIASTOLIC BLOOD PRESSURE: 78 MMHG | OXYGEN SATURATION: 97 % | WEIGHT: 139 LBS | HEIGHT: 57 IN | HEART RATE: 74 BPM

## 2020-05-20 DIAGNOSIS — E78.2 MIXED HYPERLIPIDEMIA: ICD-10-CM

## 2020-05-20 DIAGNOSIS — K21.9 GASTROESOPHAGEAL REFLUX DISEASE WITHOUT ESOPHAGITIS: Chronic | ICD-10-CM

## 2020-05-20 DIAGNOSIS — I10 ESSENTIAL HYPERTENSION WITH GOAL BLOOD PRESSURE LESS THAN 140/90: Primary | ICD-10-CM

## 2020-05-20 DIAGNOSIS — F33.42 RECURRENT MAJOR DEPRESSIVE DISORDER, IN FULL REMISSION (HCC): ICD-10-CM

## 2020-05-20 DIAGNOSIS — I10 ESSENTIAL HYPERTENSION WITH GOAL BLOOD PRESSURE LESS THAN 140/90: ICD-10-CM

## 2020-05-20 DIAGNOSIS — E55.9 VITAMIN D DEFICIENCY: ICD-10-CM

## 2020-05-20 LAB
ALBUMIN SERPL-MCNC: 4.3 G/DL (ref 3.5–5)
ALBUMIN/GLOB SERPL: 1.2 {RATIO} (ref 1.1–2.2)
ALP SERPL-CCNC: 81 U/L (ref 45–117)
ALT SERPL-CCNC: 25 U/L (ref 12–78)
ANION GAP SERPL CALC-SCNC: 5 MMOL/L (ref 5–15)
AST SERPL-CCNC: 19 U/L (ref 15–37)
BILIRUB SERPL-MCNC: 0.7 MG/DL (ref 0.2–1)
BUN SERPL-MCNC: 20 MG/DL (ref 6–20)
BUN/CREAT SERPL: 21 (ref 12–20)
CALCIUM SERPL-MCNC: 9.8 MG/DL (ref 8.5–10.1)
CHLORIDE SERPL-SCNC: 103 MMOL/L (ref 97–108)
CHOLEST SERPL-MCNC: 187 MG/DL
CO2 SERPL-SCNC: 26 MMOL/L (ref 21–32)
CREAT SERPL-MCNC: 0.97 MG/DL (ref 0.55–1.02)
ERYTHROCYTE [DISTWIDTH] IN BLOOD BY AUTOMATED COUNT: 14.1 % (ref 11.5–14.5)
GLOBULIN SER CALC-MCNC: 3.7 G/DL (ref 2–4)
GLUCOSE SERPL-MCNC: 120 MG/DL (ref 65–100)
HCT VFR BLD AUTO: 35.9 % (ref 35–47)
HDLC SERPL-MCNC: 78 MG/DL
HDLC SERPL: 2.4 {RATIO} (ref 0–5)
HGB BLD-MCNC: 11.1 G/DL (ref 11.5–16)
LDLC SERPL CALC-MCNC: 89 MG/DL (ref 0–100)
LIPID PROFILE,FLP: NORMAL
MCH RBC QN AUTO: 31 PG (ref 26–34)
MCHC RBC AUTO-ENTMCNC: 30.9 G/DL (ref 30–36.5)
MCV RBC AUTO: 100.3 FL (ref 80–99)
NRBC # BLD: 0 K/UL (ref 0–0.01)
NRBC BLD-RTO: 0 PER 100 WBC
PLATELET # BLD AUTO: 192 K/UL (ref 150–400)
PMV BLD AUTO: 12.9 FL (ref 8.9–12.9)
POTASSIUM SERPL-SCNC: 4.1 MMOL/L (ref 3.5–5.1)
PROT SERPL-MCNC: 8 G/DL (ref 6.4–8.2)
RBC # BLD AUTO: 3.58 M/UL (ref 3.8–5.2)
SODIUM SERPL-SCNC: 134 MMOL/L (ref 136–145)
TRIGL SERPL-MCNC: 100 MG/DL (ref ?–150)
VLDLC SERPL CALC-MCNC: 20 MG/DL
WBC # BLD AUTO: 6.2 K/UL (ref 3.6–11)

## 2020-05-20 RX ORDER — CITALOPRAM 20 MG/1
20 TABLET, FILM COATED ORAL DAILY
Qty: 90 TAB | Refills: 1 | Status: SHIPPED | OUTPATIENT
Start: 2020-05-20 | End: 2020-10-26 | Stop reason: SDUPTHER

## 2020-05-20 RX ORDER — PHENOL/SODIUM PHENOLATE
20 AEROSOL, SPRAY (ML) MUCOUS MEMBRANE DAILY
Qty: 30 TAB | Refills: 1 | Status: SHIPPED | OUTPATIENT
Start: 2020-05-20 | End: 2020-10-05

## 2020-05-20 RX ORDER — ERGOCALCIFEROL 1.25 MG/1
50000 CAPSULE ORAL
Qty: 13 CAP | Refills: 1 | Status: SHIPPED | OUTPATIENT
Start: 2020-05-20 | End: 2020-10-20 | Stop reason: SDUPTHER

## 2020-05-20 RX ORDER — HYDROCHLOROTHIAZIDE 25 MG/1
TABLET ORAL
Qty: 90 TAB | Refills: 1 | Status: SHIPPED | OUTPATIENT
Start: 2020-05-20 | End: 2020-12-30

## 2020-05-20 NOTE — PROGRESS NOTES
[facility]    History of Present Illness:   Langston Meckel is a 62 y.o. female with history of HTN  CC: Follow up chronic stuff  History provided by patient and Records    HPI:  Hypertension Follow up:  Currently Taking HCTZ 25 mg every day, Lisinopril 40 mg every day,Spironolactone 25 mg every day, and Metoprolol 50mg BID. The patient reports:  taking medications as instructed, no medication side effects noted, home BP monitoring in range of 425-809'R systolic over 89-23'E diastolic, no TIA's, no chest pain on exertion, no dyspnea on exertion, no swelling of ankles. BP Readings from Last 3 Encounters:   20 109/78   20 133/69   19 136/85      Gastroesophageal Reflux:  Current control of Symptoms: Uncontrolled  Primary symptoms: gas bloat, heartburn and regurgitation  Hiatal Hernia: No  Current Medications: OTC medication  The patient has no history melena or bright red blood in the stools. The patient avoids high dose aspirin and NSAID therapy. The patient is aware of diet changes needed, elevating the head of the bed and appropriate use of antacids. Hypertriglyceridemia Follow up:   Cardiovascular risks for her are: hyperlipidemia. Currently she takes Mevacor (lovastatin) , 20 mg. Myalgias: NO   Fatigue: NO   Other side effects: NO     Wt Readings from Last 3 Encounters:   20 139 lb (63 kg)   20 139 lb 6.4 oz (63.2 kg)   19 137 lb 3.2 oz (62.2 kg)     Vitamin D deficiency: Needs refill on weekly supplement. Anxiety/Depression: Requesting increase on Celexa due to depressed mood and thinking about dead son ( of cancer in February).        Health Maintenance  Health Maintenance Due   Topic Date Due    DTaP/Tdap/Td series (1 - Tdap) 1984    PAP AKA CERVICAL CYTOLOGY  2012    Shingrix Vaccine Age 50> (1 of 2) 2013    Breast Cancer Screen Mammogram  2013    FOBT Q1Y Age 50-75  2019       Past Medical, Family, and Social History:     Current Outpatient Medications on File Prior to Visit   Medication Sig Dispense Refill    hydroCHLOROthiazide (HYDRODIURIL) 25 mg tablet Take 1 tablet by mouth once daily 30 Tab 0    aspirin delayed-release 81 mg tablet Take 1 tablet by mouth once daily 30 Tab 0    spironolactone (ALDACTONE) 25 mg tablet Take 1 tablet by mouth once daily 30 Tab 2    lisinopril (PRINIVIL, ZESTRIL) 40 mg tablet Take 1 Tab by mouth daily. 90 Tab 0    lovastatin (MEVACOR) 20 mg tablet TAKE 1 TABLET BY MOUTH NIGHTLY 90 Tab 0    hydrOXYzine HCl (ATARAX) 50 mg tablet Take 1 Tab by mouth three (3) times daily as needed for Itching. 30 Tab 0    metoprolol tartrate (LOPRESSOR) 50 mg tablet TAKE 1 TABLET BY MOUTH TWICE DAILY 180 Tab 1    naproxen (NAPROSYN) 500 mg tablet TAKE 1 TABLET BY MOUTH TWICE DAILY WITH  MEALS 20 Tab 1    citalopram (CELEXA) 10 mg tablet Take 1 Tab by mouth daily. 30 Tab 5    ergocalciferol (ERGOCALCIFEROL) 50,000 unit capsule Take 1 Cap by mouth every seven (7) days. 10 Cap 0    MULTIVITAMIN WITH MINERALS (ONE-A-DAY 50 PLUS PO) Take  by mouth. No current facility-administered medications on file prior to visit.         Patient Active Problem List   Diagnosis Code    Hypertension I10    Depression F32.9    Anxiety F41.9    Vitamin D deficiency E55.9    Hyperlipidemia E78.5    GERD (gastroesophageal reflux disease) K21.9    Partial edentulism K08.409       Social History     Socioeconomic History    Marital status: SINGLE     Spouse name: Not on file    Number of children: Not on file    Years of education: Not on file    Highest education level: Not on file   Occupational History    Not on file   Social Needs    Financial resource strain: Not on file    Food insecurity     Worry: Not on file     Inability: Not on file    Transportation needs     Medical: Not on file     Non-medical: Not on file   Tobacco Use    Smoking status: Never Smoker    Smokeless tobacco: Never Used   Substance and Sexual Activity    Alcohol use: Yes     Comment: 2 beers every other day    Drug use: No    Sexual activity: Yes   Lifestyle    Physical activity     Days per week: Not on file     Minutes per session: Not on file    Stress: Not on file   Relationships    Social connections     Talks on phone: Not on file     Gets together: Not on file     Attends [employer] service: Not on file     Active member of club or organization: Not on file     Attends meetings of clubs or organizations: Not on file     Relationship status: Not on file    Intimate partner violence     Fear of current or ex partner: Not on file     Emotionally abused: Not on file     Physically abused: Not on file     Forced sexual activity: Not on file   Other Topics Concern    Not on file   Social History Narrative    Not on file       Review of Systems   Review of Systems   Constitutional: Negative for chills and fever. Respiratory: Negative for cough and shortness of breath. Cardiovascular: Negative for chest pain. Gastrointestinal: Negative for nausea and vomiting. Neurological: Negative for headaches. Objective:     Visit Vitals  /78 (BP 1 Location: Right arm, BP Patient Position: Sitting)   Pulse 74   Temp 98.1 °F (36.7 °C) (Oral)   Resp 18   Ht 4' 9\" (1.448 m)   Wt 139 lb (63 kg)   SpO2 97%   BMI 30.08 kg/m²        Physical Exam  Vitals signs and nursing note reviewed. Constitutional:       Appearance: Normal appearance. HENT:      Head: Normocephalic and atraumatic. Neck:      Musculoskeletal: Normal range of motion and neck supple. Cardiovascular:      Rate and Rhythm: Normal rate and regular rhythm. Pulses: Normal pulses. Heart sounds: Normal heart sounds. No murmur. No friction rub. No gallop. Pulmonary:      Effort: Pulmonary effort is normal.      Breath sounds: Normal breath sounds. Abdominal:      General: Abdomen is flat.  Bowel sounds are normal. Palpations: Abdomen is soft. Musculoskeletal: Normal range of motion. Skin:     General: Skin is warm and dry. Neurological:      Mental Status: She is alert. Psychiatric:         Mood and Affect: Mood normal.         Behavior: Behavior normal.         Pertinent Labs/Studies:      Assessment and orders:       ICD-10-CM ICD-9-CM    1. Essential hypertension with goal blood pressure less than 140/90 I10 401.9 hydroCHLOROthiazide (HYDRODIURIL) 25 mg tablet      CBC W/O DIFF      METABOLIC PANEL, COMPREHENSIVE   2. Gastroesophageal reflux disease without esophagitis K21.9 530.81 Omeprazole delayed release (PRILOSEC D/R) 20 mg tablet   3. Vitamin D deficiency E55.9 268.9 ergocalciferol (ERGOCALCIFEROL) 1,250 mcg (50,000 unit) capsule   4. Recurrent major depressive disorder, in full remission ([facility] F33.42 296.36 citalopram (CELEXA) 20 mg tablet   5. Mixed hyperlipidemia E78.2 272.2 LIPID PANEL     Diagnoses and all orders for this visit:    1. Essential hypertension with goal blood pressure less than 140/90: Well controlled, may consider decreasing based on home readings. Given BP log  -     hydroCHLOROthiazide (HYDRODIURIL) 25 mg tablet; Take 1 tablet by mouth once daily  -     CBC W/O DIFF; Future  -     METABOLIC PANEL, COMPREHENSIVE; Future    2. Gastroesophageal reflux disease without esophagitis: Trial of Omeprazole due to uncontrolled symptoms  -     Omeprazole delayed release (PRILOSEC D/R) 20 mg tablet; Take 1 Tab by mouth daily. 3. Vitamin D deficiency: Refill and repeat lab in 2 months  -     ergocalciferol (ERGOCALCIFEROL) 1,250 mcg (50,000 unit) capsule; Take 1 Cap by mouth every seven (7) days. 4. Recurrent major depressive disorder, in full remission [facility]): Increasing Citalopram, secondary to grief. May consider adding trazodone. Reviewed EKG, QTc in safe limits. -     citalopram (CELEXA) 20 mg tablet; Take 1 Tab by mouth daily.     5. Mixed hyperlipidemia: Labs  -     LIPID PANEL; Future      Follow-up and Dispositions    · Return in about 2 months (around 7/20/2020) for Follow up Chronic Conditions. I have discussed the diagnosis with the patient and the intended plan as seen in the above orders. Social history, medical history, and labs were reviewed. The patient has received an after-visit summary and questions were answered concerning future plans. I have discussed medication side effects and warnings with the patient as well.     MD SHALINI Camp & CARLOS TSE [facility]  05/20/20

## 2020-05-20 NOTE — PROGRESS NOTES
Chief Complaint   Patient presents with    Medication Refill     Visit Vitals  /78 (BP 1 Location: Right arm, BP Patient Position: Sitting)   Pulse 74   Temp 98.1 °F (36.7 °C) (Oral)   Resp 18   Ht 4' 9\" (1.448 m)   Wt 139 lb (63 kg)   SpO2 97%   BMI 30.08 kg/m²     1. Have you been to the ER, urgent care clinic since your last visit? Hospitalized since your last visit? No    2. Have you seen or consulted any other health care providers outside of the [address] [place] [address] since your last visit? Include any pap smears or colon screening.  No    Reviewed record in preparation for visit and have necessary documentation  Pt did not bring medication to office visit for review  opportunity was given for questions  Goals that were addressed and/or need to be completed during or after this appointment include   Health Maintenance Due   Topic Date Due    DTaP/Tdap/Td series (1 - Tdap) 06/16/1984    PAP AKA CERVICAL CYTOLOGY  01/21/2012    Shingrix Vaccine Age 50> (1 of 2) 06/16/2013    Breast Cancer Screen Mammogram  06/16/2013    FOBT Q1Y Age 50-75  05/22/2019

## 2020-05-20 NOTE — PATIENT INSTRUCTIONS
Megan [facility] Affiliated with [address] [place] [address],  [address] [place], [facility] 
[phone] Log blood pressures at home while sitting, relaxed 3-5 times weekly and bring to next visit. Goal BP of 130/80 on average or lower. Call office as soon as possible if BP's over 140/90 or below 110/50 on multiple occasions and/or with symptoms of dizziness, chest pain, shortness of breath, headache or ankle swelling. Recheck Log and BP in office in [unfilled] Blood Pressure Record Patient Name:  ______________________ :  ______________________ Date/Time BP Reading Pulse

## 2020-05-21 NOTE — PROGRESS NOTES
Mild anemia, near baseline though lipid panel wnl, patient already on low intensity statin, Elevated BG.

## 2020-06-06 DIAGNOSIS — I10 ESSENTIAL HYPERTENSION: ICD-10-CM

## 2020-06-07 RX ORDER — LISINOPRIL 40 MG/1
TABLET ORAL
Qty: 90 TAB | Refills: 0 | Status: SHIPPED | OUTPATIENT
Start: 2020-06-07 | End: 2020-08-31 | Stop reason: SDUPTHER

## 2020-08-18 DIAGNOSIS — I10 ESSENTIAL HYPERTENSION WITH GOAL BLOOD PRESSURE LESS THAN 140/90: ICD-10-CM

## 2020-08-21 RX ORDER — SPIRONOLACTONE 25 MG/1
TABLET ORAL
Qty: 30 TAB | Refills: 0 | Status: SHIPPED | OUTPATIENT
Start: 2020-08-21 | End: 2020-09-22

## 2020-08-31 DIAGNOSIS — I10 ESSENTIAL HYPERTENSION: ICD-10-CM

## 2020-08-31 RX ORDER — LISINOPRIL 40 MG/1
TABLET ORAL
Qty: 90 TAB | Refills: 1 | Status: SHIPPED | OUTPATIENT
Start: 2020-08-31 | End: 2021-02-03 | Stop reason: SDUPTHER

## 2020-09-09 DIAGNOSIS — E78.2 MIXED HYPERLIPIDEMIA: ICD-10-CM

## 2020-09-09 RX ORDER — LOVASTATIN 20 MG/1
TABLET ORAL
Qty: 90 TAB | Refills: 0 | Status: SHIPPED | OUTPATIENT
Start: 2020-09-09 | End: 2020-12-07

## 2020-10-12 DIAGNOSIS — I10 ESSENTIAL HYPERTENSION WITH GOAL BLOOD PRESSURE LESS THAN 140/90: ICD-10-CM

## 2020-10-12 RX ORDER — ASPIRIN 81 MG/1
81 TABLET ORAL DAILY
Qty: 90 TAB | Refills: 0 | Status: SHIPPED | OUTPATIENT
Start: 2020-10-12 | End: 2021-01-12

## 2020-10-20 DIAGNOSIS — E55.9 VITAMIN D DEFICIENCY: ICD-10-CM

## 2020-10-21 RX ORDER — ERGOCALCIFEROL 1.25 MG/1
50000 CAPSULE ORAL
Qty: 13 CAP | Refills: 1 | Status: SHIPPED | OUTPATIENT
Start: 2020-10-21 | End: 2021-03-30

## 2020-10-26 DIAGNOSIS — F33.42 RECURRENT MAJOR DEPRESSIVE DISORDER, IN FULL REMISSION (HCC): ICD-10-CM

## 2020-10-26 RX ORDER — CITALOPRAM 20 MG/1
20 TABLET, FILM COATED ORAL DAILY
Qty: 90 TAB | Refills: 1 | Status: SHIPPED | OUTPATIENT
Start: 2020-10-26 | End: 2021-07-18

## 2020-12-05 DIAGNOSIS — E78.2 MIXED HYPERLIPIDEMIA: ICD-10-CM

## 2020-12-07 RX ORDER — LOVASTATIN 20 MG/1
TABLET ORAL
Qty: 90 TAB | Refills: 0 | Status: SHIPPED | OUTPATIENT
Start: 2020-12-07 | End: 2021-03-02

## 2021-01-26 DIAGNOSIS — I10 ESSENTIAL HYPERTENSION WITH GOAL BLOOD PRESSURE LESS THAN 140/90: ICD-10-CM

## 2021-01-27 RX ORDER — METOPROLOL TARTRATE 50 MG/1
TABLET ORAL
Qty: 60 TAB | Refills: 0 | Status: SHIPPED | OUTPATIENT
Start: 2021-01-27 | End: 2021-03-29

## 2021-02-03 ENCOUNTER — VIRTUAL VISIT (OUTPATIENT)
Dept: FAMILY MEDICINE CLINIC | Age: 56
End: 2021-02-03
Payer: MEDICAID

## 2021-02-03 DIAGNOSIS — F41.9 ANXIETY: ICD-10-CM

## 2021-02-03 DIAGNOSIS — K21.9 GASTROESOPHAGEAL REFLUX DISEASE WITHOUT ESOPHAGITIS: ICD-10-CM

## 2021-02-03 DIAGNOSIS — E78.2 MIXED HYPERLIPIDEMIA: ICD-10-CM

## 2021-02-03 DIAGNOSIS — I10 ESSENTIAL HYPERTENSION: Primary | ICD-10-CM

## 2021-02-03 PROCEDURE — G2012 BRIEF CHECK IN BY MD/QHP: HCPCS | Performed by: FAMILY MEDICINE

## 2021-02-03 RX ORDER — LISINOPRIL 40 MG/1
TABLET ORAL
Qty: 90 TAB | Refills: 1 | Status: SHIPPED | OUTPATIENT
Start: 2021-02-03 | End: 2021-03-09

## 2021-02-03 RX ORDER — NAPROXEN 500 MG/1
TABLET ORAL
Qty: 60 TAB | Refills: 1 | Status: SHIPPED | OUTPATIENT
Start: 2021-02-03 | End: 2022-08-22

## 2021-02-03 RX ORDER — HYDROCHLOROTHIAZIDE 25 MG/1
25 TABLET ORAL DAILY
Qty: 90 TAB | Refills: 1 | Status: SHIPPED | OUTPATIENT
Start: 2021-02-03 | End: 2021-07-18

## 2021-02-03 NOTE — PROGRESS NOTES
Mohini Fu is a 60 y.o. female evaluated via Telephone on 21. Patient Identity confirmed by . Consent:  He and/or health care decision maker is aware that that he may receive a bill for this telephone service, depending on his insurance coverage, and has provided verbal consent to proceed: Yes    Physician Location: Office  Patient Location: Home  Nurse Assisting with Encounter: Zion Aviles LPN    Chief Complaint   Patient presents with    Medication Refill      Information gathered from patient and/or health care decision maker. HPI:   Hypertension Follow up:  Currently Taking Metoprolol 50 mg BID, HCTZ 25 mg, Lisinopril 40 mg. The patient reports:  taking medications as instructed, no medication side effects noted, home BP monitoring in range of 280-345'E systolic over 30-57'Q diastolic, no TIA's, no chest pain on exertion, no swelling of ankles. Patient reports feeling tired with activity, even small amounts, but not Dyspnea. BP Readings from Last 3 Encounters:   20 109/78   20 133/69   19 136/85      Gastroesophageal Reflux:  Current control of Symptoms: Controlled  Primary symptoms: heartburn  Hiatal Hernia: No  Current Medications: Prilosec  The patient has no history melena or bright red blood in the stools. The patient avoids high dose aspirin and NSAID therapy. The patient is aware of diet changes needed, elevating the head of the bed and appropriate use of antacids. Hypertriglyceridemia Follow up:   Cardiovascular risks for her are: hypertension  hyperlipidemia. Currently she takes Mevacor (lovastatin), 20 mg. Myalgias: YES   Fatigue: YES   Other side effects: YES     Wt Readings from Last 3 Encounters:   20 139 lb (63 kg)   20 139 lb 6.4 oz (63.2 kg)   19 137 lb 3.2 oz (62.2 kg)       Review of Systems   Constitutional: Negative for chills and fever. HENT: Negative for congestion. Respiratory: Negative for cough. Cardiovascular: Negative for chest pain and palpitations. Gastrointestinal: Negative for nausea and vomiting. Genitourinary: Negative for dysuria and urgency. Psychiatric/Behavioral: The patient has insomnia. Limited Exam:  Due to this being a TeleHealth evaluation, many elements of the physical examination are unable to be assessed. Constitutional: Appears well-developed and well-nourished in no apparent distress   Mental status: Alert and awake, Oriented to person/place/time, Able to follow commands  Psychiatric: Normal affect, normal judgment/insight. No hallucinations     Current Outpatient Medications on File Prior to Visit   Medication Sig Dispense Refill    metoprolol tartrate (LOPRESSOR) 50 mg tablet TAKE 1 TABLET BY MOUTH TWICE DAILY 60 Tab 0    aspirin delayed-release 81 mg tablet Take 1 tablet by mouth once daily 90 Tab 1    omeprazole (PRILOSEC) 20 mg capsule Take 1 capsule by mouth once daily 90 Cap 0    lovastatin (MEVACOR) 20 mg tablet Take 1 tablet by mouth nightly 90 Tab 0    citalopram (CELEXA) 20 mg tablet Take 1 Tab by mouth daily. 90 Tab 1    ergocalciferol (ERGOCALCIFEROL) 1,250 mcg (50,000 unit) capsule Take 1 Cap by mouth every seven (7) days. 13 Cap 1    spironolactone (ALDACTONE) 25 mg tablet Take 1 tablet by mouth once daily 90 Tab 1    hydrOXYzine HCl (ATARAX) 50 mg tablet Take 1 Tab by mouth three (3) times daily as needed for Itching. 30 Tab 0    MULTIVITAMIN WITH MINERALS (ONE-A-DAY 50 PLUS PO) Take  by mouth.  [DISCONTINUED] hydroCHLOROthiazide (HYDRODIURIL) 25 mg tablet Take 1 tablet by mouth once daily 30 Tab 0    [DISCONTINUED] lisinopriL (PRINIVIL, ZESTRIL) 40 mg tablet Take 1 tablet by mouth once daily 90 Tab 1    [DISCONTINUED] naproxen (NAPROSYN) 500 mg tablet TAKE 1 TABLET BY MOUTH TWICE DAILY WITH  MEALS 20 Tab 0     No current facility-administered medications on file prior to visit.          No Known Allergies     Patient Active Problem List    Diagnosis Date Noted    Partial edentulism 12/23/2016    Hyperlipidemia 12/30/2011    GERD (gastroesophageal reflux disease) 12/30/2011    Vitamin D deficiency 01/31/2011    Depression 08/30/2010    Anxiety 08/30/2010    Hypertension         Health Maintenance Due   Topic Date Due    COVID-19 Vaccine (1 of 2) 06/16/1979    DTaP/Tdap/Td series (1 - Tdap) 06/16/1984    PAP AKA CERVICAL CYTOLOGY  01/21/2012    Shingrix Vaccine Age 50> (1 of 2) 06/16/2013    Breast Cancer Screen Mammogram  06/16/2013    Colorectal Cancer Screening Combo  05/22/2019    Flu Vaccine (1) 09/01/2020        Assessment/Plan:  Details of this discussion including any medical advice provided: Refill of Anti-hypertensives, continue currnet Anxiety medications, but given recent deaths of friends, may consider Trazodone sleep aid if needed. ICD-10-CM ICD-9-CM    1. Essential hypertension  I10 401.9 hydroCHLOROthiazide (HYDRODIURIL) 25 mg tablet      lisinopriL (PRINIVIL, ZESTRIL) 40 mg tablet   2. Anxiety  F41.9 300.00    3. Gastroesophageal reflux disease without esophagitis  K21.9 530.81            Total Time: minutes: 11-20 minutes was spent on telemedicine encounter discussing above problems and plans. Patient Problem list, medications, and Allergies were reviewed during this encounter. Pursuant to the emergency declaration under the [address] [place] [address], [facility] waiver authority and the [employer] Act, this Telephone Visit was conducted, with patient's consent, to reduce the patient's risk of exposure to COVID-19 and provide continuity of care for an established patient. I affirm this is a Patient Initiated Episode with an Established Patient who has not had a related appointment within my department in the past 7 days or scheduled within the next 24 hours. Discussed diagnoses in detail with patient.  Medication risks/benefits/side effects discussed with patient. All of the patient's questions were addressed. The patient understands and agrees with our plan of care. The patient knows to call back if they are unsure of or forget any changes we discussed today or if the symptoms change.     Note: not billable if this call serves to triage the patient into an appointment for the relevant concern    MD SHALINI Flores & CARLOS TSE [facility]  02/03/21

## 2021-02-03 NOTE — PROGRESS NOTES
Chief Complaint   Patient presents with    Medication Refill     1. Have you been to the ER, urgent care clinic since your last visit? Hospitalized since your last visit? No    2. Have you seen or consulted any other health care providers outside of the [address] [place] [address] since your last visit? Include any pap smears or colon screening.  No    Reviewed record in preparation for visit and have necessary documentation  Pt did not bring medication to office visit for review  opportunity was given for questions  Goals that were addressed and/or need to be completed during or after this appointment include   Health Maintenance Due   Topic Date Due    COVID-19 Vaccine (1 of 2) 06/16/1979    DTaP/Tdap/Td series (1 - Tdap) 06/16/1984    PAP AKA CERVICAL CYTOLOGY  01/21/2012    Shingrix Vaccine Age 50> (1 of 2) 06/16/2013    Breast Cancer Screen Mammogram  06/16/2013    Colorectal Cancer Screening Combo  05/22/2019    Flu Vaccine (1) 09/01/2020

## 2021-07-17 DIAGNOSIS — I10 ESSENTIAL HYPERTENSION WITH GOAL BLOOD PRESSURE LESS THAN 140/90: ICD-10-CM

## 2021-07-17 DIAGNOSIS — I10 ESSENTIAL HYPERTENSION: ICD-10-CM

## 2021-07-17 DIAGNOSIS — F33.42 RECURRENT MAJOR DEPRESSIVE DISORDER, IN FULL REMISSION (HCC): ICD-10-CM

## 2021-07-18 RX ORDER — CITALOPRAM 20 MG/1
TABLET, FILM COATED ORAL
Qty: 90 TABLET | Refills: 0 | Status: SHIPPED | OUTPATIENT
Start: 2021-07-18 | End: 2021-10-19 | Stop reason: SDUPTHER

## 2021-07-18 RX ORDER — ASPIRIN 81 MG/1
TABLET ORAL
Qty: 90 TABLET | Refills: 0 | Status: SHIPPED | OUTPATIENT
Start: 2021-07-18 | End: 2021-10-19 | Stop reason: SDUPTHER

## 2021-07-18 RX ORDER — HYDROCHLOROTHIAZIDE 25 MG/1
TABLET ORAL
Qty: 90 TABLET | Refills: 0 | Status: SHIPPED | OUTPATIENT
Start: 2021-07-18 | End: 2021-09-20

## 2021-09-21 DIAGNOSIS — E55.9 VITAMIN D DEFICIENCY: ICD-10-CM

## 2021-09-21 RX ORDER — ERGOCALCIFEROL 1.25 MG/1
CAPSULE ORAL
Qty: 5 CAPSULE | Refills: 0 | Status: SHIPPED | OUTPATIENT
Start: 2021-09-21 | End: 2021-10-19 | Stop reason: SDUPTHER

## 2021-10-19 ENCOUNTER — OFFICE VISIT (OUTPATIENT)
Dept: FAMILY MEDICINE CLINIC | Age: 56
End: 2021-10-19
Payer: MEDICAID

## 2021-10-19 VITALS
SYSTOLIC BLOOD PRESSURE: 133 MMHG | DIASTOLIC BLOOD PRESSURE: 84 MMHG | BODY MASS INDEX: 30.2 KG/M2 | OXYGEN SATURATION: 98 % | HEART RATE: 79 BPM | WEIGHT: 140 LBS | HEIGHT: 57 IN | RESPIRATION RATE: 20 BRPM | TEMPERATURE: 98 F

## 2021-10-19 DIAGNOSIS — I10 ESSENTIAL HYPERTENSION: Primary | ICD-10-CM

## 2021-10-19 DIAGNOSIS — E55.9 VITAMIN D DEFICIENCY: ICD-10-CM

## 2021-10-19 DIAGNOSIS — Z23 ENCOUNTER FOR IMMUNIZATION: ICD-10-CM

## 2021-10-19 DIAGNOSIS — F33.42 RECURRENT MAJOR DEPRESSIVE DISORDER, IN FULL REMISSION (HCC): ICD-10-CM

## 2021-10-19 DIAGNOSIS — F51.04 PSYCHOPHYSIOLOGICAL INSOMNIA: ICD-10-CM

## 2021-10-19 DIAGNOSIS — E78.2 MIXED HYPERLIPIDEMIA: ICD-10-CM

## 2021-10-19 PROCEDURE — 90686 IIV4 VACC NO PRSV 0.5 ML IM: CPT | Performed by: FAMILY MEDICINE

## 2021-10-19 PROCEDURE — 90471 IMMUNIZATION ADMIN: CPT | Performed by: FAMILY MEDICINE

## 2021-10-19 PROCEDURE — 99214 OFFICE O/P EST MOD 30 MIN: CPT | Performed by: FAMILY MEDICINE

## 2021-10-19 RX ORDER — LOVASTATIN 20 MG/1
20 TABLET ORAL
Qty: 90 TABLET | Refills: 1 | Status: SHIPPED | OUTPATIENT
Start: 2021-10-19 | End: 2021-11-02

## 2021-10-19 RX ORDER — ASPIRIN 81 MG/1
81 TABLET ORAL DAILY
Qty: 90 TABLET | Refills: 1 | Status: SHIPPED | OUTPATIENT
Start: 2021-10-19 | End: 2021-10-20

## 2021-10-19 RX ORDER — ERGOCALCIFEROL 1.25 MG/1
CAPSULE ORAL
Qty: 13 CAPSULE | Refills: 1 | Status: SHIPPED | OUTPATIENT
Start: 2021-10-19 | End: 2022-06-03 | Stop reason: SDUPTHER

## 2021-10-19 RX ORDER — CITALOPRAM 20 MG/1
20 TABLET, FILM COATED ORAL DAILY
Qty: 90 TABLET | Refills: 1 | Status: SHIPPED | OUTPATIENT
Start: 2021-10-19 | End: 2022-05-16

## 2021-10-19 RX ORDER — LISINOPRIL 40 MG/1
TABLET ORAL
Qty: 90 TABLET | Refills: 1 | Status: SHIPPED | OUTPATIENT
Start: 2021-10-19 | End: 2021-11-23

## 2021-10-19 RX ORDER — TRAZODONE HYDROCHLORIDE 50 MG/1
50 TABLET ORAL
Qty: 60 TABLET | Refills: 1 | Status: SHIPPED | OUTPATIENT
Start: 2021-10-19 | End: 2022-06-22 | Stop reason: SDUPTHER

## 2021-10-19 RX ORDER — SPIRONOLACTONE 25 MG/1
25 TABLET ORAL DAILY
Qty: 90 TABLET | Refills: 1 | Status: SHIPPED | OUTPATIENT
Start: 2021-10-19 | End: 2022-05-16

## 2021-10-19 RX ORDER — HYDROCHLOROTHIAZIDE 25 MG/1
25 TABLET ORAL DAILY
Qty: 90 TABLET | Refills: 1 | Status: SHIPPED | OUTPATIENT
Start: 2021-10-19 | End: 2021-12-01

## 2021-10-19 NOTE — PROGRESS NOTES
[address] [facility]    History of Present Illness:   Idalia Caldera is a 60 y.o. female with history of HTN, GERD, Depression and Anxiety, HLD, Vitamin D deficicency  CC: Follow up Chronic Conditions  History provided by patient and Records    HPI:  Hypertension Follow up:  Currently Taking:   Key CAD CHF Meds             lisinopriL (PRINIVIL, ZESTRIL) 40 mg tablet (Taking) Take 1 tablet by mouth once daily    lovastatin (MEVACOR) 20 mg tablet (Taking) Take 1 Tablet by mouth nightly. spironolactone (ALDACTONE) 25 mg tablet (Taking) Take 1 Tablet by mouth daily. hydroCHLOROthiazide (HYDRODIURIL) 25 mg tablet (Taking) Take 1 Tablet by mouth daily. aspirin delayed-release 81 mg tablet (Taking) Take 1 Tablet by mouth daily. metoprolol tartrate (LOPRESSOR) 50 mg tablet (Taking) Take 1 Tab by mouth two (2) times a day. The patient reports:  taking medications as instructed, no medication side effects noted, no TIA's, no chest pain on exertion, no dyspnea on exertion, no swelling of ankles, no orthostatic dizziness or lightheadedness, no orthopnea or paroxysmal nocturnal dyspnea. BP Readings from Last 3 Encounters:   10/19/21 133/84   05/20/20 109/78   01/29/20 133/69      Gastroesophageal Reflux:  Current control of Symptoms: Controlled  Primary symptoms: heartburn  Hiatal Hernia: No  Current Medications: Prilosec  The patient has no history melena or bright red blood in the stools. The patient avoids high dose aspirin and NSAID therapy. The patient is aware of diet changes needed, elevating the head of the bed and appropriate use of antacids. Anxiety/Depression/Insomnia: Patient has some issues with racing thoughts. Around this season had multiple deaths in last few years. Patient tried Celexa at night but now wears off during.     Health Maintenance  Health Maintenance Due   Topic Date Due    COVID-19 Vaccine (1) Never done    DTaP/Tdap/Td series (1 - Tdap) Never done   Ardyth Moritz Cervical cancer screen  01/21/2012    Shingrix Vaccine Age 50> (1 of 2) Never done    Breast Cancer Screen Mammogram  Never done    Colorectal Cancer Screening Combo  05/22/2019       Past Medical, Family, and Social History:     Current Outpatient Medications on File Prior to Visit   Medication Sig Dispense Refill    omeprazole (PRILOSEC) 20 mg capsule Take 1 capsule by mouth once daily 90 Cap 1    metoprolol tartrate (LOPRESSOR) 50 mg tablet Take 1 Tab by mouth two (2) times a day. 180 Tab 1    naproxen (NAPROSYN) 500 mg tablet TAKE 1 TABLET BY MOUTH TWICE DAILY WITH  MEALS 60 Tab 1    MULTIVITAMIN WITH MINERALS (ONE-A-DAY 50 PLUS PO) Take  by mouth. No current facility-administered medications on file prior to visit. Patient Active Problem List   Diagnosis Code    Essential hypertension I10    Depression F32. A    Anxiety F41.9    Vitamin D deficiency E55.9    Hyperlipidemia E78.5    GERD (gastroesophageal reflux disease) K21.9    Partial edentulism K08.409       Social History     Socioeconomic History    Marital status: SINGLE     Spouse name: Not on file    Number of children: Not on file    Years of education: Not on file    Highest education level: Not on file   Occupational History    Not on file   Tobacco Use    Smoking status: Never Smoker    Smokeless tobacco: Never Used   Substance and Sexual Activity    Alcohol use: Yes     Comment: 2 beers every other day    Drug use: No    Sexual activity: Yes   Other Topics Concern    Not on file   Social History Narrative    Not on file     Social Determinants of Health     Financial Resource Strain:     Difficulty of Paying Living Expenses:    Food Insecurity:     Worried About Running Out of Food in the Last Year:     Ran Out of Food in the Last Year:    Transportation Needs:     Lack of Transportation (Medical):      Lack of Transportation (Non-Medical):    Physical Activity:     Days of Exercise per Week:     Minutes of Exercise per Session:    Stress:     Feeling of Stress :    Social Connections:     Frequency of Communication with Friends and Family:     Frequency of Social Gatherings with Friends and Family:     Attends Restoration Services:     Active Member of Clubs or Organizations:     Attends Club or Organization Meetings:     Marital Status:    Intimate Partner Violence:     Fear of Current or Ex-Partner:     Emotionally Abused:     Physically Abused:     Sexually Abused:        Review of Systems   Review of Systems   Constitutional: Negative for chills and fever. Respiratory: Negative for cough. Cardiovascular: Negative for chest pain and palpitations. Psychiatric/Behavioral: Positive for depression. The patient is nervous/anxious. Objective:     Visit Vitals  /84   Pulse 79   Temp 98 °F (36.7 °C)   Resp 20   Ht 4' 9\" (1.448 m)   Wt 140 lb (63.5 kg)   SpO2 98%   BMI 30.30 kg/m²        Physical Exam  Vitals and nursing note reviewed. Constitutional:       Appearance: Normal appearance. HENT:      Head: Normocephalic and atraumatic. Cardiovascular:      Rate and Rhythm: Normal rate and regular rhythm. Pulses: Normal pulses. Heart sounds: Normal heart sounds. No murmur heard. No friction rub. No gallop. Pulmonary:      Effort: Pulmonary effort is normal.      Breath sounds: Normal breath sounds. Abdominal:      General: Abdomen is flat. Bowel sounds are normal.      Palpations: Abdomen is soft. Musculoskeletal:      Cervical back: Normal range of motion and neck supple. Skin:     General: Skin is warm and dry. Neurological:      Mental Status: She is alert. Pertinent Labs/Studies:      Assessment and orders:       ICD-10-CM ICD-9-CM    1.  Essential hypertension  I10 401.9 lisinopriL (PRINIVIL, ZESTRIL) 40 mg tablet      spironolactone (ALDACTONE) 25 mg tablet      hydroCHLOROthiazide (HYDRODIURIL) 25 mg tablet      CBC W/O DIFF      METABOLIC PANEL, COMPREHENSIVE      METABOLIC PANEL, COMPREHENSIVE      CBC W/O DIFF      DISCONTINUED: aspirin delayed-release 81 mg tablet   2. Mixed hyperlipidemia  E78.2 272.2 lovastatin (MEVACOR) 20 mg tablet      LIPID PANEL      LIPID PANEL   3. Vitamin D deficiency  E55.9 268.9 ergocalciferol (ERGOCALCIFEROL) 1,250 mcg (50,000 unit) capsule   4. Recurrent major depressive disorder, [facility]  F33.42 296.36 citalopram (CELEXA) 20 mg tablet      traZODone (DESYREL) 50 mg tablet   5. Psychophysiological insomnia  F51.04 307.42 traZODone (DESYREL) 50 mg tablet   6. Encounter for immunization  Z23 V03.89 INFLUENZA VIRUS VAC QUAD,SPLIT,PRESV FREE SYRINGE IM      [place] IMMUNIZ ADMIN,1 SINGLE/COMB VAC/TOXOID     Diagnoses and all orders for this visit:    1. Essential hypertension: Our goal is to normalize the blood pressure to decrease the risks of strokes and heart attacks. The patient is in agreement with the plan. -     lisinopriL (PRINIVIL, ZESTRIL) 40 mg tablet; Take 1 tablet by mouth once daily  -     spironolactone (ALDACTONE) 25 mg tablet; Take 1 Tablet by mouth daily. -     hydroCHLOROthiazide (HYDRODIURIL) 25 mg tablet; Take 1 Tablet by mouth daily. -     aspirin delayed-release 81 mg tablet; Take 1 Tablet by mouth daily.  -     CBC W/O DIFF; Future  -     METABOLIC PANEL, COMPREHENSIVE; Future    2. Mixed hyperlipidemia: The patient is aware of our goal to reduce or eliminate the long term problems (such as strokes and heart attacks) related to poorly controlled Triglycerides, LDL, Cholesterol.   -     lovastatin (MEVACOR) 20 mg tablet; Take 1 Tablet by mouth nightly. -     LIPID PANEL; Future    3. Vitamin D deficiency  -     ergocalciferol (ERGOCALCIFEROL) 1,250 mcg (50,000 unit) capsule; Take 1 capsule by mouth once a week    4. Recurrent major depressive disorder, in full remission [facility]): Trial of Trazodone, Celea to the morning.  -     citalopram (CELEXA) 20 mg tablet;  Take 1 Tablet by mouth daily.  -     traZODone (DESYREL) 50 mg tablet; Take 1 Tablet by mouth nightly. 5. Psychophysiological insomnia  -     traZODone (DESYREL) 50 mg tablet; Take 1 Tablet by mouth nightly. Follow-up and Dispositions    · Return in about 3 months (around 1/19/2022). I have discussed the diagnosis with the patient and the intended plan as seen in the above orders. Social history, medical history, and labs were reviewed. The patient has received an after-visit summary and questions were answered concerning future plans. I have discussed medication side effects and warnings with the patient as well.     MD SHALINI Jensen & CARLOS TSE [facility]  10/20/21

## 2021-10-19 NOTE — PROGRESS NOTES
1. Have you been to the ER, urgent care clinic since your last visit? Hospitalized since your last visit? No    2. Have you seen or consulted any other health care providers outside of the [address] [place] [address] since your last visit? Include any pap smears or colon screening.  No  Reviewed record in preparation for visit and have necessary documentation  Pt did not bring medication to office visit for review    Goals that were addressed and/or need to be completed during or after this appointment include   Health Maintenance Due   Topic Date Due    COVID-19 Vaccine (1) Never done    DTaP/Tdap/Td series (1 - Tdap) Never done    Cervical cancer screen  01/21/2012    Shingrix Vaccine Age 50> (1 of 2) Never done    Breast Cancer Screen Mammogram  Never done    Colorectal Cancer Screening Combo  05/22/2019    Lipid Screen  05/20/2021    Flu Vaccine (1) 09/01/2021

## 2021-10-20 LAB
ALBUMIN SERPL-MCNC: 3.8 G/DL (ref 3.5–5)
ALBUMIN/GLOB SERPL: 0.9 {RATIO} (ref 1.1–2.2)
ALP SERPL-CCNC: 85 U/L (ref 45–117)
ALT SERPL-CCNC: 26 U/L (ref 12–78)
ANION GAP SERPL CALC-SCNC: 4 MMOL/L (ref 5–15)
AST SERPL-CCNC: 33 U/L (ref 15–37)
BILIRUB SERPL-MCNC: 0.4 MG/DL (ref 0.2–1)
BUN SERPL-MCNC: 14 MG/DL (ref 6–20)
BUN/CREAT SERPL: 14 (ref 12–20)
CALCIUM SERPL-MCNC: 9.4 MG/DL (ref 8.5–10.1)
CHLORIDE SERPL-SCNC: 105 MMOL/L (ref 97–108)
CHOLEST SERPL-MCNC: 182 MG/DL
CO2 SERPL-SCNC: 25 MMOL/L (ref 21–32)
CREAT SERPL-MCNC: 0.99 MG/DL (ref 0.55–1.02)
ERYTHROCYTE [DISTWIDTH] IN BLOOD BY AUTOMATED COUNT: 15.5 % (ref 11.5–14.5)
GLOBULIN SER CALC-MCNC: 4.1 G/DL (ref 2–4)
GLUCOSE SERPL-MCNC: 91 MG/DL (ref 65–100)
HCT VFR BLD AUTO: 33.4 % (ref 35–47)
HDLC SERPL-MCNC: 76 MG/DL
HDLC SERPL: 2.4 {RATIO} (ref 0–5)
HGB BLD-MCNC: 10.3 G/DL (ref 11.5–16)
LDLC SERPL CALC-MCNC: 85.8 MG/DL (ref 0–100)
MCH RBC QN AUTO: 30.7 PG (ref 26–34)
MCHC RBC AUTO-ENTMCNC: 30.8 G/DL (ref 30–36.5)
MCV RBC AUTO: 99.7 FL (ref 80–99)
NRBC # BLD: 0 K/UL (ref 0–0.01)
NRBC BLD-RTO: 0 PER 100 WBC
PLATELET # BLD AUTO: 203 K/UL (ref 150–400)
PMV BLD AUTO: 12.9 FL (ref 8.9–12.9)
POTASSIUM SERPL-SCNC: 5.6 MMOL/L (ref 3.5–5.1)
PROT SERPL-MCNC: 7.9 G/DL (ref 6.4–8.2)
RBC # BLD AUTO: 3.35 M/UL (ref 3.8–5.2)
SODIUM SERPL-SCNC: 134 MMOL/L (ref 136–145)
TRIGL SERPL-MCNC: 101 MG/DL (ref ?–150)
VLDLC SERPL CALC-MCNC: 20.2 MG/DL
WBC # BLD AUTO: 6.3 K/UL (ref 3.6–11)

## 2021-11-24 ENCOUNTER — TELEPHONE (OUTPATIENT)
Dept: FAMILY MEDICINE CLINIC | Age: 56
End: 2021-11-24

## 2021-11-24 NOTE — TELEPHONE ENCOUNTER
Fax received from [address] [place] [address] stating interaction between lisinopril and spironolactone could lead to hyperkalemia. Called and spoke to pharmacy and advised that this was reviewed by Dr. Stephen Lisa and he would like to continue both medications.

## 2022-03-06 DIAGNOSIS — I10 ESSENTIAL HYPERTENSION: ICD-10-CM

## 2022-03-07 RX ORDER — HYDROCHLOROTHIAZIDE 25 MG/1
TABLET ORAL
Qty: 90 TABLET | Refills: 0 | Status: SHIPPED | OUTPATIENT
Start: 2022-03-07 | End: 2022-06-02

## 2022-03-31 DIAGNOSIS — I10 ESSENTIAL HYPERTENSION WITH GOAL BLOOD PRESSURE LESS THAN 140/90: ICD-10-CM

## 2022-03-31 RX ORDER — METOPROLOL TARTRATE 50 MG/1
TABLET ORAL
Qty: 180 TABLET | Refills: 0 | Status: SHIPPED | OUTPATIENT
Start: 2022-03-31 | End: 2022-06-22 | Stop reason: SDUPTHER

## 2022-05-19 DIAGNOSIS — I10 ESSENTIAL HYPERTENSION: ICD-10-CM

## 2022-05-19 RX ORDER — ASPIRIN 81 MG/1
TABLET ORAL
Qty: 30 TABLET | Refills: 0 | Status: SHIPPED | OUTPATIENT
Start: 2022-05-19 | End: 2022-06-22 | Stop reason: SDUPTHER

## 2022-06-03 DIAGNOSIS — E55.9 VITAMIN D DEFICIENCY: ICD-10-CM

## 2022-06-03 RX ORDER — ERGOCALCIFEROL 1.25 MG/1
CAPSULE ORAL
Qty: 4 CAPSULE | Refills: 0 | Status: SHIPPED | OUTPATIENT
Start: 2022-06-03 | End: 2022-06-22 | Stop reason: SDUPTHER

## 2022-06-22 ENCOUNTER — OFFICE VISIT (OUTPATIENT)
Dept: FAMILY MEDICINE CLINIC | Age: 57
End: 2022-06-22
Payer: MEDICAID

## 2022-06-22 VITALS
BODY MASS INDEX: 29.77 KG/M2 | WEIGHT: 138 LBS | DIASTOLIC BLOOD PRESSURE: 65 MMHG | HEART RATE: 70 BPM | OXYGEN SATURATION: 97 % | TEMPERATURE: 98.5 F | SYSTOLIC BLOOD PRESSURE: 104 MMHG | RESPIRATION RATE: 16 BRPM | HEIGHT: 57 IN

## 2022-06-22 DIAGNOSIS — E55.9 VITAMIN D DEFICIENCY: ICD-10-CM

## 2022-06-22 DIAGNOSIS — I10 ESSENTIAL HYPERTENSION: ICD-10-CM

## 2022-06-22 DIAGNOSIS — F33.42 RECURRENT MAJOR DEPRESSIVE DISORDER, IN FULL REMISSION (HCC): ICD-10-CM

## 2022-06-22 DIAGNOSIS — Z12.11 COLON CANCER SCREENING: ICD-10-CM

## 2022-06-22 DIAGNOSIS — E78.2 MIXED HYPERLIPIDEMIA: ICD-10-CM

## 2022-06-22 DIAGNOSIS — I10 ESSENTIAL HYPERTENSION WITH GOAL BLOOD PRESSURE LESS THAN 140/90: ICD-10-CM

## 2022-06-22 DIAGNOSIS — K21.9 GASTROESOPHAGEAL REFLUX DISEASE WITHOUT ESOPHAGITIS: Chronic | ICD-10-CM

## 2022-06-22 DIAGNOSIS — F51.04 PSYCHOPHYSIOLOGICAL INSOMNIA: ICD-10-CM

## 2022-06-22 DIAGNOSIS — F41.9 ANXIETY: Primary | ICD-10-CM

## 2022-06-22 PROCEDURE — 99214 OFFICE O/P EST MOD 30 MIN: CPT | Performed by: FAMILY MEDICINE

## 2022-06-22 RX ORDER — OMEPRAZOLE 20 MG/1
20 CAPSULE, DELAYED RELEASE ORAL DAILY
Qty: 90 CAPSULE | Refills: 1 | Status: SHIPPED | OUTPATIENT
Start: 2022-06-22

## 2022-06-22 RX ORDER — LOVASTATIN 20 MG/1
20 TABLET ORAL DAILY
Qty: 90 TABLET | Refills: 1 | Status: SHIPPED | OUTPATIENT
Start: 2022-06-22

## 2022-06-22 RX ORDER — ASPIRIN 81 MG/1
81 TABLET ORAL DAILY
Qty: 90 TABLET | Refills: 1 | Status: SHIPPED | OUTPATIENT
Start: 2022-06-22

## 2022-06-22 RX ORDER — METOPROLOL TARTRATE 50 MG/1
50 TABLET ORAL 2 TIMES DAILY
Qty: 180 TABLET | Refills: 1 | Status: SHIPPED | OUTPATIENT
Start: 2022-06-22

## 2022-06-22 RX ORDER — LISINOPRIL 40 MG/1
40 TABLET ORAL DAILY
Qty: 90 TABLET | Refills: 1 | Status: SHIPPED | OUTPATIENT
Start: 2022-06-22

## 2022-06-22 RX ORDER — TRAZODONE HYDROCHLORIDE 50 MG/1
50 TABLET ORAL
Qty: 60 TABLET | Refills: 1 | Status: SHIPPED | OUTPATIENT
Start: 2022-06-22

## 2022-06-22 RX ORDER — CITALOPRAM 20 MG/1
20 TABLET, FILM COATED ORAL DAILY
Qty: 90 TABLET | Refills: 1 | Status: SHIPPED | OUTPATIENT
Start: 2022-06-22

## 2022-06-22 RX ORDER — HYDROCHLOROTHIAZIDE 25 MG/1
25 TABLET ORAL DAILY
Qty: 90 TABLET | Refills: 1 | Status: SHIPPED | OUTPATIENT
Start: 2022-06-22

## 2022-06-22 RX ORDER — SPIRONOLACTONE 25 MG/1
25 TABLET ORAL DAILY
Qty: 90 TABLET | Refills: 1 | Status: SHIPPED | OUTPATIENT
Start: 2022-06-22

## 2022-06-22 RX ORDER — ERGOCALCIFEROL 1.25 MG/1
CAPSULE ORAL
Qty: 12 CAPSULE | Refills: 1 | Status: SHIPPED | OUTPATIENT
Start: 2022-06-22

## 2022-06-22 NOTE — PROGRESS NOTES
1. \"Have you been to the ER, urgent care clinic since your last visit? Hospitalized since your last visit? \" No    2. \"Have you seen or consulted any other health care providers outside of the [address] [place] [address] since your last visit? \" No     3. For patients aged 39-70: Has the patient had a colonoscopy / FIT/ Cologuard? No      If the patient is female:    4. For patients aged 41-77: Has the patient had a mammogram within the past 2 years? No      5. For patients aged 21-65: Has the patient had a pap smear?  No    Health Maintenance Due   Topic Date Due    COVID-19 Vaccine (1) Never done    DTaP/Tdap/Td series (1 - Tdap) Never done    Cervical cancer screen  01/21/2012    Shingrix Vaccine Age 50> (1 of 2) Never done    Breast Cancer Screen Mammogram  Never done    Colorectal Cancer Screening Combo  05/22/2019    Depression Monitoring  02/03/2022

## 2022-06-22 NOTE — PROGRESS NOTES
[facility]    History of Present Illness:   Edison Serrano is a 59 y.o. female with history of HTN, GERD, Depression and Anxiety, HLD, Vitamin D deficicency  CC: Follow up  History provided by patient and Records    HPI:  Depression: Noting some issues due to upcoming anniversary of son's death 3 years ago. Taking Celexa. Hypertension Follow up:  Currently Taking:   Key CAD CHF Meds             spironolactone (ALDACTONE) 25 mg tablet (Taking) Take 1 Tablet by mouth daily. metoprolol tartrate (LOPRESSOR) 50 mg tablet (Taking) Take 1 Tablet by mouth two (2) times a day. lovastatin (MEVACOR) 20 mg tablet (Taking) Take 1 Tablet by mouth daily. lisinopriL (PRINIVIL, ZESTRIL) 40 mg tablet (Taking) Take 1 Tablet by mouth daily. hydroCHLOROthiazide (HYDRODIURIL) 25 mg tablet (Taking) Take 1 Tablet by mouth daily. aspirin delayed-release 81 mg tablet (Taking) Take 1 Tablet by mouth daily. The patient reports:  taking medications as instructed, no medication side effects noted, home BP monitoring in range of 526-574'G systolic over 98-90'J diastolic, no TIA's, no chest pain on exertion, no dyspnea on exertion, no swelling of ankles, no orthostatic dizziness or lightheadedness, no orthopnea or paroxysmal nocturnal dyspnea. BP Readings from Last 3 Encounters:   06/22/22 104/65   10/19/21 133/84   05/20/20 109/78      Hypertriglyceridemia Follow up:   Cardiovascular risks for her are: hypertension  hyperlipidemia. Current Medications:  Key Antihyperlipidemia Meds             lovastatin (MEVACOR) 20 mg tablet (Taking) Take 1 Tablet by mouth daily.           Compliance: YES   Myalgias: NO   Fatigue: NO   Other side effects: NO     Wt Readings from Last 3 Encounters:   06/22/22 138 lb (62.6 kg)   10/19/21 140 lb (63.5 kg)   05/20/20 139 lb (63 kg)       Lab Results   Component Value Date/Time    Cholesterol, total 182 10/19/2021 03:05 PM    HDL Cholesterol 76 10/19/2021 03:05 PM    LDL, calculated 85.8 10/19/2021 03:05 PM    VLDL, calculated 20.2 10/19/2021 03:05 PM    Triglyceride 101 10/19/2021 03:05 PM    CHOL/HDL Ratio 2.4 10/19/2021 03:05 PM      Lab Results   Component Value Date/Time    ALT (SGPT) 26 10/19/2021 03:05 PM    AST (SGOT) 33 10/19/2021 03:05 PM    Alk. phosphatase 85 10/19/2021 03:05 PM    Bilirubin, total 0.4 10/19/2021 03:05 PM      Gastroesophageal Reflux:  Current control of Symptoms: Stable  Primary symptoms: heartburn  Hiatal Hernia: No  Current Medications: Prilosec  The patient has no history melena or bright red blood in the stools. The patient avoids high dose aspirin and NSAID therapy. The patient is aware of diet changes needed, elevating the head of the bed and appropriate use of antacids. Health Maintenance  Health Maintenance Due   Topic Date Due    COVID-19 Vaccine (1) Never done    DTaP/Tdap/Td series (1 - Tdap) Never done    Cervical cancer screen  01/21/2012    Shingrix Vaccine Age 50> (1 of 2) Never done    Breast Cancer Screen Mammogram  Never done    Colorectal Cancer Screening Combo  05/22/2019    Depression Monitoring  02/03/2022       Past Medical, Family, and Social History:     Current Outpatient Medications on File Prior to Visit   Medication Sig Dispense Refill    naproxen (NAPROSYN) 500 mg tablet TAKE 1 TABLET BY MOUTH TWICE DAILY WITH  MEALS 60 Tab 1    MULTIVITAMIN WITH MINERALS (ONE-A-DAY 50 PLUS PO) Take  by mouth.  [DISCONTINUED] ergocalciferol (ERGOCALCIFEROL) 1,250 mcg (50,000 unit) capsule Take 1 capsule by mouth once a week 4 Capsule 0    [DISCONTINUED] hydroCHLOROthiazide (HYDRODIURIL) 25 mg tablet Take 1 Tablet by mouth daily.  APPT REQUIRED FOR FOLLOWUP 30 Tablet 0    [DISCONTINUED] lisinopriL (PRINIVIL, ZESTRIL) 40 mg tablet Take 1 tablet by mouth once daily 30 Tablet 0    [DISCONTINUED] aspirin delayed-release 81 mg tablet Take 1 tablet by mouth once daily 30 Tablet 0    [DISCONTINUED] spironolactone (ALDACTONE) 25 mg tablet Take 1 Tablet by mouth daily. APPT REQUIRED FOR REFILLS 30 Tablet 0    [DISCONTINUED] citalopram (CELEXA) 20 mg tablet Take 1 Tablet by mouth daily. APPT REQUIRED FOR REFILLS 30 Tablet 0    [DISCONTINUED] lovastatin (MEVACOR) 20 mg tablet Take 1 Tablet by mouth daily. APPT REQUIRED FOR REFILLS 90 Tablet 0    [DISCONTINUED] metoprolol tartrate (LOPRESSOR) 50 mg tablet Take 1 tablet by mouth twice daily 180 Tablet 0    [DISCONTINUED] omeprazole (PRILOSEC) 20 mg capsule Take 1 capsule by mouth once daily 90 Capsule 1    [DISCONTINUED] traZODone (DESYREL) 50 mg tablet Take 1 Tablet by mouth nightly. 60 Tablet 1     No current facility-administered medications on file prior to visit. Patient Active Problem List   Diagnosis Code    Essential hypertension I10    Depression F32. A    Anxiety F41.9    Vitamin D deficiency E55.9    Hyperlipidemia E78.5    GERD (gastroesophageal reflux disease) K21.9    Partial edentulism K08.409       Social History     Socioeconomic History    Marital status: SINGLE   Tobacco Use    Smoking status: Never Smoker    Smokeless tobacco: Never Used   Substance and Sexual Activity    Alcohol use: Yes     Comment: 2 beers every other day    Drug use: No    Sexual activity: Yes        Review of Systems   Review of Systems   Constitutional: Negative for chills and fever. HENT: Negative for congestion. Cardiovascular: Negative for chest pain and palpitations. Gastrointestinal: Negative for abdominal pain, nausea and vomiting. Objective:     Visit Vitals  /65 (BP 1 Location: Right arm, BP Patient Position: Sitting, BP Cuff Size: Adult)   Pulse 70   Temp 98.5 °F (36.9 °C) (Oral)   Resp 16   Ht 4' 9\" (1.448 m)   Wt 138 lb (62.6 kg)   LMP 04/12/2017   SpO2 97%   BMI 29.86 kg/m²        Physical Exam  Vitals and nursing note reviewed. Constitutional:       Appearance: Normal appearance. HENT:      Head: Normocephalic and atraumatic. Cardiovascular:      Rate and Rhythm: Normal rate and regular rhythm. Pulses: Normal pulses. Heart sounds: Normal heart sounds. No murmur heard. No friction rub. No gallop. Pulmonary:      Effort: Pulmonary effort is normal.      Breath sounds: Normal breath sounds. Abdominal:      General: Abdomen is flat. Bowel sounds are normal.      Palpations: Abdomen is soft. Musculoskeletal:      Cervical back: Normal range of motion and neck supple. Skin:     General: Skin is warm and dry. Neurological:      Mental Status: She is alert. Pertinent Labs/Studies:      Assessment and orders:       ICD-10-CM ICD-9-CM    1. Anxiety  F41.9 300.00    2. Recurrent major depressive disorder, in full remission ([facility]  F33.42 296.36 traZODone (DESYREL) 50 mg tablet      citalopram (CELEXA) 20 mg tablet   3. Psychophysiological insomnia  F51.04 307.42 traZODone (DESYREL) 50 mg tablet   4. Essential hypertension  I10 401.9 spironolactone (ALDACTONE) 25 mg tablet      lisinopriL (PRINIVIL, ZESTRIL) 40 mg tablet      hydroCHLOROthiazide (HYDRODIURIL) 25 mg tablet      aspirin delayed-release 81 mg tablet   5. Gastroesophageal reflux disease without esophagitis  K21.9 530.81 omeprazole (PRILOSEC) 20 mg capsule   6. Essential hypertension with goal blood pressure less than 140/90  I10 401.9 metoprolol tartrate (LOPRESSOR) 50 mg tablet      CBC W/O DIFF      METABOLIC PANEL, COMPREHENSIVE   7. Mixed hyperlipidemia  E78.2 272.2 lovastatin (MEVACOR) 20 mg tablet      LIPID PANEL   8. Vitamin D deficiency  E55.9 268.9 ergocalciferol (ERGOCALCIFEROL) 1,250 mcg (50,000 unit) capsule   9. Colon cancer screening  Z12.11 V76.51 OCCULT BLOOD IMMUNOASSAY,DIAGNOSTIC     Diagnoses and all orders for this visit:    1. Anxiety/Recurrent major depressive disorder, in full remission ([facility]): Continue medications  -     traZODone (DESYREL) 50 mg tablet; Take 1 Tablet by mouth nightly. -     citalopram (CELEXA) 20 mg tablet;  Take 1 Tablet by mouth daily. 3. Psychophysiological insomnia: Continue Medications  -     traZODone (DESYREL) 50 mg tablet; Take 1 Tablet by mouth nightly. 4. Essential hypertension: Our goal is to normalize the blood pressure to decrease the risks of strokes and heart attacks. The patient is in agreement with the plan. -     spironolactone (ALDACTONE) 25 mg tablet; Take 1 Tablet by mouth daily. -     lisinopriL (PRINIVIL, ZESTRIL) 40 mg tablet; Take 1 Tablet by mouth daily. -     hydroCHLOROthiazide (HYDRODIURIL) 25 mg tablet; Take 1 Tablet by mouth daily. -     aspirin delayed-release 81 mg tablet; Take 1 Tablet by mouth daily. 5. Gastroesophageal reflux disease without esophagitis  -     omeprazole (PRILOSEC) 20 mg capsule; Take 1 Capsule by mouth daily. 6. Essential hypertension with goal blood pressure less than 140/90  -     metoprolol tartrate (LOPRESSOR) 50 mg tablet; Take 1 Tablet by mouth two (2) times a day. -     CBC W/O DIFF; Future  -     METABOLIC PANEL, COMPREHENSIVE; Future    7. Mixed hyperlipidemia: The patient is aware of our goal to reduce or eliminate the long term problems (such as strokes and heart attacks) related to poorly controlled Triglycerides, LDL, Cholesterol.   -     lovastatin (MEVACOR) 20 mg tablet; Take 1 Tablet by mouth daily.  -     LIPID PANEL; Future    8. Vitamin D deficiency  -     ergocalciferol (ERGOCALCIFEROL) 1,250 mcg (50,000 unit) capsule; Take 1 capsule by mouth once a week    9. Colon cancer screening  -     OCCULT BLOOD IMMUNOASSAY,DIAGNOSTIC; Future      Follow-up and Dispositions    · Return in about 3 months (around 9/22/2022) for Well woman PAP test.           I have discussed the diagnosis with the patient and the intended plan as seen in the above orders. Social history, medical history, and labs were reviewed. The patient has received an after-visit summary and questions were answered concerning future plans.   I have discussed medication side effects and warnings with the patient as well.     Orlan Sandifer, MD PRISCILLA CHAN & CARLOS TSE [facility]  06/22/22

## 2022-06-24 LAB
ALBUMIN SERPL-MCNC: 4.6 G/DL (ref 3.8–4.9)
ALBUMIN/GLOB SERPL: 1.4 {RATIO} (ref 1.2–2.2)
ALP SERPL-CCNC: 91 IU/L (ref 44–121)
ALT SERPL-CCNC: 15 IU/L (ref 0–32)
AST SERPL-CCNC: 18 IU/L (ref 0–40)
BILIRUB SERPL-MCNC: 0.5 MG/DL (ref 0–1.2)
BUN SERPL-MCNC: 11 MG/DL (ref 6–24)
BUN/CREAT SERPL: 8 (ref 9–23)
CALCIUM SERPL-MCNC: 9.8 MG/DL (ref 8.7–10.2)
CHLORIDE SERPL-SCNC: 98 MMOL/L (ref 96–106)
CHOLEST SERPL-MCNC: 182 MG/DL (ref 100–199)
CO2 SERPL-SCNC: 23 MMOL/L (ref 20–29)
CREAT SERPL-MCNC: 1.3 MG/DL (ref 0.57–1)
EGFR: 47 ML/MIN/1.73
ERYTHROCYTE [DISTWIDTH] IN BLOOD BY AUTOMATED COUNT: 14.8 % (ref 11.7–15.4)
GLOBULIN SER CALC-MCNC: 3.3 G/DL (ref 1.5–4.5)
GLUCOSE SERPL-MCNC: 81 MG/DL (ref 65–99)
HCT VFR BLD AUTO: 34.4 % (ref 34–46.6)
HDLC SERPL-MCNC: 69 MG/DL
HGB BLD-MCNC: 10.9 G/DL (ref 11.1–15.9)
LDLC SERPL CALC-MCNC: 85 MG/DL (ref 0–99)
MCH RBC QN AUTO: 30.4 PG (ref 26.6–33)
MCHC RBC AUTO-ENTMCNC: 31.7 G/DL (ref 31.5–35.7)
MCV RBC AUTO: 96 FL (ref 79–97)
PLATELET # BLD AUTO: 246 X10E3/UL (ref 150–450)
POTASSIUM SERPL-SCNC: 4.8 MMOL/L (ref 3.5–5.2)
PROT SERPL-MCNC: 7.9 G/DL (ref 6–8.5)
RBC # BLD AUTO: 3.59 X10E6/UL (ref 3.77–5.28)
SODIUM SERPL-SCNC: 139 MMOL/L (ref 134–144)
TRIGL SERPL-MCNC: 164 MG/DL (ref 0–149)
VLDLC SERPL CALC-MCNC: 28 MG/DL (ref 5–40)
WBC # BLD AUTO: 9 X10E3/UL (ref 3.4–10.8)

## 2022-07-29 ENCOUNTER — TELEPHONE (OUTPATIENT)
Dept: FAMILY MEDICINE CLINIC | Age: 57
End: 2022-07-29

## 2022-07-29 LAB — HEMOCCULT STL QL IA: POSITIVE

## 2022-07-29 NOTE — TELEPHONE ENCOUNTER
Called patient and confirmed identity with 2 verifiers. Advised that patient has positive FIT test and needs to have Colonoscopy. Patient will consider where she wants to go and will follow up.     MD SHALINI Jenesn & CARLOS TSE [facility]  07/29/22

## 2022-08-01 ENCOUNTER — TELEPHONE (OUTPATIENT)
Dept: FAMILY MEDICINE CLINIC | Age: 57
End: 2022-08-01

## 2022-08-04 ENCOUNTER — TELEPHONE (OUTPATIENT)
Dept: FAMILY MEDICINE CLINIC | Age: 57
End: 2022-08-04

## 2022-08-04 NOTE — TELEPHONE ENCOUNTER
Pt would like to discuss results from her colon test with provider? She knows it came back positive. Would like to know what she should do now.

## 2022-08-05 NOTE — TELEPHONE ENCOUNTER
Call to pt unanswered,message left. If she calls back. Please inform per         Advised she needs to have a colonoscopy, The question I had for her was where did she want me to send a referral.  I know providers in [facility], but can send to [facility] or [place] as well.       Please ask where she would like to go for testing

## 2022-08-16 ENCOUNTER — OFFICE VISIT (OUTPATIENT)
Dept: FAMILY MEDICINE CLINIC | Age: 57
End: 2022-08-16
Payer: MEDICAID

## 2022-08-16 VITALS
HEIGHT: 57 IN | WEIGHT: 139.2 LBS | TEMPERATURE: 98.5 F | HEART RATE: 65 BPM | OXYGEN SATURATION: 99 % | BODY MASS INDEX: 30.03 KG/M2 | RESPIRATION RATE: 18 BRPM | DIASTOLIC BLOOD PRESSURE: 71 MMHG | SYSTOLIC BLOOD PRESSURE: 110 MMHG

## 2022-08-16 DIAGNOSIS — R19.5 POSITIVE FIT (FECAL IMMUNOCHEMICAL TEST): Primary | ICD-10-CM

## 2022-08-16 DIAGNOSIS — I10 ESSENTIAL HYPERTENSION: ICD-10-CM

## 2022-08-16 DIAGNOSIS — K21.9 GASTROESOPHAGEAL REFLUX DISEASE WITHOUT ESOPHAGITIS: ICD-10-CM

## 2022-08-16 PROCEDURE — 99214 OFFICE O/P EST MOD 30 MIN: CPT | Performed by: FAMILY MEDICINE

## 2022-08-16 NOTE — PROGRESS NOTES
1. Have you been to the ER, urgent care clinic since your last visit? Hospitalized since your last visit? No    2. Have you seen or consulted any other health care providers outside of the [address] [place] [address] since your last visit? Include any pap smears or colon screening. No    3. For patients aged 39-70: Has the patient had a colonoscopy / FIT/ Cologuard? 4 weeks ago [facility], pt has never had colonoscopy     If the patient is female:    4. For patients aged 41-77: Has the patient had a mammogram within the past 2 years? NO       5. For patients aged 21-65: Has the patient had a pap smear? One scheduled for next month [facility]     Reviewed record in preparation for visit and have necessary documentation  Pt did not bring medication to office visit for review  Patient is accompanied by self I have received verbal consent from Gregorio Ewing to discuss any/all medical information while they are present in the room.     Goals that were addressed and/or need to be completed during or after this appointment include     Health Maintenance Due   Topic Date Due    COVID-19 Vaccine (1) Never done    DTaP/Tdap/Td series (1 - Tdap) Never done    Cervical cancer screen  01/21/2012    Shingrix Vaccine Age 50> (1 of 2) Never done    Breast Cancer Screen Mammogram  Never done

## 2022-08-22 RX ORDER — NAPROXEN 500 MG/1
TABLET ORAL
Qty: 60 TABLET | Refills: 0 | Status: SHIPPED | OUTPATIENT
Start: 2022-08-22

## 2022-08-23 NOTE — PROGRESS NOTES
Progress Note    Patient: Idalia Caldera MRN: [id number]  SSN: [id number]    YOB: 1965  Age: 59 y.o. Sex: female        Chief Complaint   Patient presents with    Results     Discuss stool test results and further testing for blood in stool. she is a 59y.o. year old female who presents for positive FIT test. She has never had a colonoscopy. Encounter Diagnoses   Name Primary? Positive FIT (fecal immunochemical test) Yes    Gastroesophageal reflux disease without esophagitis     Essential hypertension      BP Readings from Last 3 Encounters:   08/16/22 110/71   06/22/22 104/65   10/19/21 133/84     Wt Readings from Last 3 Encounters:   08/16/22 139 lb 3.2 oz (63.1 kg)   06/22/22 138 lb (62.6 kg)   10/19/21 140 lb (63.5 kg)     Body mass index is 30.12 kg/m². Lab Results   Component Value Date/Time    WBC 9.0 06/22/2022 12:00 AM    HGB 10.9 (L) 06/22/2022 12:00 AM    Hemoglobin (POC) 11.5 09/03/2015 03:10 PM    HCT 34.4 06/22/2022 12:00 AM    PLATELET 991 09/27/6305 12:00 AM    MCV 96 06/22/2022 12:00 AM       Patient Active Problem List   Diagnosis Code    Essential hypertension I10    Depression F32. A    Anxiety F41.9    Vitamin D deficiency E55.9    Hyperlipidemia E78.5    GERD (gastroesophageal reflux disease) K21.9    Partial edentulism K08.409     Past Surgical History:   Procedure Laterality Date    HX HERNIA REPAIR  1983    HX TUBAL LIGATION  1995    PA CARDIAC SURG PROCEDURE UNLIST  1974    closed hole in heart     Social History     Socioeconomic History    Marital status: SINGLE     Spouse name: Not on file    Number of children: Not on file    Years of education: Not on file    Highest education level: Not on file   Occupational History    Not on file   Tobacco Use    Smoking status: Never    Smokeless tobacco: Never   Substance and Sexual Activity    Alcohol use: Yes     Comment: 2 beers every other day    Drug use: No    Sexual activity: Yes   Other Topics Concern    Not on file   Social History Narrative    Not on file     Social Determinants of Health     Financial Resource Strain: Medium Risk    Difficulty of Paying Living Expenses: Somewhat hard   Food Insecurity: Food Insecurity Present    Worried About Running Out of Food in the Last Year: Sometimes true    Ran Out of Food in the Last Year: Sometimes true   Transportation Needs: Not on file   Physical Activity: Not on file   Stress: Not on file   Social Connections: Not on file   Intimate Partner Violence: Not on file   Housing Stability: Not on file     Family History   Problem Relation Age of Onset    Diabetes Mother     Hypertension Mother     Cancer Maternal Grandmother     Asthma Daughter     Cancer Father         lung cancer    Diabetes Sister     Alcohol abuse Neg Hx     Arthritis-rheumatoid Neg Hx     Bleeding Prob Neg Hx     Elevated Lipids Neg Hx     Headache Neg Hx     Heart Disease Neg Hx     Lung Disease Neg Hx     Migraines Neg Hx     Psychiatric Disorder Neg Hx     Stroke Neg Hx     Mental Retardation Neg Hx      Current Outpatient Medications   Medication Sig    traZODone (DESYREL) 50 mg tablet Take 1 Tablet by mouth nightly. spironolactone (ALDACTONE) 25 mg tablet Take 1 Tablet by mouth daily. omeprazole (PRILOSEC) 20 mg capsule Take 1 Capsule by mouth daily. metoprolol tartrate (LOPRESSOR) 50 mg tablet Take 1 Tablet by mouth two (2) times a day. lovastatin (MEVACOR) 20 mg tablet Take 1 Tablet by mouth daily. lisinopriL (PRINIVIL, ZESTRIL) 40 mg tablet Take 1 Tablet by mouth daily. hydroCHLOROthiazide (HYDRODIURIL) 25 mg tablet Take 1 Tablet by mouth daily. ergocalciferol (ERGOCALCIFEROL) 1,250 mcg (50,000 unit) capsule Take 1 capsule by mouth once a week    citalopram (CELEXA) 20 mg tablet Take 1 Tablet by mouth daily. aspirin delayed-release 81 mg tablet Take 1 Tablet by mouth daily. MULTIVITAMIN WITH MINERALS (ONE-A-DAY 50 PLUS PO) Take  by mouth.     naproxen (NAPROSYN) 500 mg tablet TAKE 1 TABLET BY MOUTH TWICE DAILY WITH MEALS     No current facility-administered medications for this visit. No Known Allergies    Review of Systems:  Constitutional: Negative for fatigue, malaise  Resp: Negative for cough, wheezing or SOB  CV: Negative for chest pain, dizziness or palpitations  GI: Negative for nausea or abdominal pain  MS: Negative for acute myalgias or arthralgias   Neuro: Negative for HA, weakness or paresthesia  Psych: hx of depression and anxiety     Vitals:    08/16/22 1453   BP: 110/71   Pulse: 65   Resp: 18   Temp: 98.5 °F (36.9 °C)   TempSrc: Oral   SpO2: 99%   Weight: 139 lb 3.2 oz (63.1 kg)   Height: 4' 9\" (1.448 m)       Physical Examination:  General: Well developed, well nourished, in no acute distress  Head: Normocephalic, atraumatic  Eyes: Sclera clear, EOMI  Neck: Normal range of motion  Respiratory: Symmetrical, unlabored effort  Cardiovascular: Regular rate and rhythm  Extremities: Full range of motion, normal gait  Neurologic: No focal deficits  Psych: Active, alert and oriented. Affect appropriate       ICD-10-CM ICD-9-CM    1. Positive FIT (fecal immunochemical test)  R19.5 792.1 REFERRAL TO GASTROENTEROLOGY      2. Gastroesophageal reflux disease without esophagitis  K21.9 530.81       3. Essential hypertension  I10 401.9           Plan of care:  Diagnoses were discussed in detail with patient. Medications reviewed and appropriate. Patient to continue current prescribed medications as written. Medication risks/benefits/side effects discussed with patient. All of the patient's questions were addressed and answered to apparent satisfaction. The patient understands and agrees with our plan of care. The patient knows to call back if they have questions about the plan of care or if symptoms change. The patient received an After-Visit Summary which contains VS, diagnoses, orders, allergy and medication lists.       Future Appointments   Date Time Provider Hossein Humphrey   9/22/2022 10:20 AM Murali Anne MD [facility] BS AMB

## 2022-09-22 ENCOUNTER — OFFICE VISIT (OUTPATIENT)
Dept: FAMILY MEDICINE CLINIC | Age: 57
End: 2022-09-22
Payer: MEDICAID

## 2022-09-22 VITALS
DIASTOLIC BLOOD PRESSURE: 68 MMHG | HEIGHT: 57 IN | TEMPERATURE: 98.4 F | BODY MASS INDEX: 30.2 KG/M2 | OXYGEN SATURATION: 98 % | RESPIRATION RATE: 18 BRPM | WEIGHT: 140 LBS | HEART RATE: 74 BPM | SYSTOLIC BLOOD PRESSURE: 108 MMHG

## 2022-09-22 DIAGNOSIS — E78.2 MIXED HYPERLIPIDEMIA: ICD-10-CM

## 2022-09-22 DIAGNOSIS — E55.9 VITAMIN D DEFICIENCY: ICD-10-CM

## 2022-09-22 DIAGNOSIS — F33.42 RECURRENT MAJOR DEPRESSIVE DISORDER, IN FULL REMISSION (HCC): ICD-10-CM

## 2022-09-22 DIAGNOSIS — Z01.419 WELL WOMAN EXAM WITH ROUTINE GYNECOLOGICAL EXAM: Primary | ICD-10-CM

## 2022-09-22 DIAGNOSIS — Z12.4 CERVICAL CANCER SCREENING: ICD-10-CM

## 2022-09-22 DIAGNOSIS — Z23 ENCOUNTER FOR IMMUNIZATION: ICD-10-CM

## 2022-09-22 DIAGNOSIS — I10 ESSENTIAL HYPERTENSION: ICD-10-CM

## 2022-09-22 DIAGNOSIS — K21.9 GASTROESOPHAGEAL REFLUX DISEASE WITHOUT ESOPHAGITIS: Chronic | ICD-10-CM

## 2022-09-22 PROCEDURE — 90686 IIV4 VACC NO PRSV 0.5 ML IM: CPT | Performed by: FAMILY MEDICINE

## 2022-09-22 PROCEDURE — 99396 PREV VISIT EST AGE 40-64: CPT | Performed by: FAMILY MEDICINE

## 2022-09-22 NOTE — PROGRESS NOTES
Chief Complaint   Patient presents with    Gyn Exam     1. \"Have you been to the ER, urgent care clinic since your last visit? Hospitalized since your last visit? \" No    2. \"Have you seen or consulted any other health care providers outside of the [address] [place] [address] since your last visit? \" No     3. For patients aged 39-70: Has the patient had a colonoscopy / FIT/ Cologuard? No      If the patient is female:    4. For patients aged 41-77: Has the patient had a mammogram within the past 2 years? No      5. For patients aged 21-65: Has the patient had a pap smear? No PAP this visit.     Health Maintenance Due   Topic Date Due    COVID-19 Vaccine (1) Never done    DTaP/Tdap/Td series (1 - Tdap) Never done    Cervical cancer screen  01/21/2012    Shingrix Vaccine Age 50> (1 of 2) Never done    Breast Cancer Screen Mammogram  Never done    Flu Vaccine (1) 08/01/2022

## 2022-09-22 NOTE — PROGRESS NOTES
[facility]    History of Present Illness:   Sophia Ruiz is a 59 y.o. female with history of HTN, GERD, Depression and Anxiety, HLD, Vitamin D deficicency  CC: Follow up  History provided by patient and Records    HPI:  Well Woman exam:  Sophia Ruiz is a 59 y.o. female presenting for well woman exam.     How would you rate your health in generally over the last year? Good  Has your physical and emotional health limited your social activities with family or friends? no    Current Complaints? Hypertension Follow up:  Currently Taking:   Key CAD CHF Meds               spironolactone (ALDACTONE) 25 mg tablet (Taking) Take 1 Tablet by mouth daily. metoprolol tartrate (LOPRESSOR) 50 mg tablet (Taking) Take 1 Tablet by mouth two (2) times a day. lovastatin (MEVACOR) 20 mg tablet (Taking) Take 1 Tablet by mouth daily. lisinopriL (PRINIVIL, ZESTRIL) 40 mg tablet (Taking) Take 1 Tablet by mouth daily. hydroCHLOROthiazide (HYDRODIURIL) 25 mg tablet (Taking) Take 1 Tablet by mouth daily. aspirin delayed-release 81 mg tablet (Taking) Take 1 Tablet by mouth daily. The patient reports:  taking medications as instructed, no medication side effects noted, no TIA's, no chest pain on exertion, no dyspnea on exertion, no swelling of ankles, no orthostatic dizziness or lightheadedness, no orthopnea or paroxysmal nocturnal dyspnea. BP Readings from Last 3 Encounters:   09/22/22 108/68   08/16/22 110/71   06/22/22 104/65      Hypertriglyceridemia Follow up:   Cardiovascular risks for her are: hypertension  hyperlipidemia. Current Medications:  Key Antihyperlipidemia Meds               lovastatin (MEVACOR) 20 mg tablet (Taking) Take 1 Tablet by mouth daily.             Compliance: YES   Myalgias: NO   Fatigue: NO   Other side effects: NO     Wt Readings from Last 3 Encounters:   09/22/22 140 lb (63.5 kg)   08/16/22 139 lb 3.2 oz (63.1 kg)   06/22/22 138 lb (62.6 kg)       Lab Results   Component Value Date/Time    Cholesterol, total 182 06/22/2022 12:00 AM    HDL Cholesterol 69 06/22/2022 12:00 AM    LDL, calculated 85 06/22/2022 12:00 AM    LDL, calculated 85.8 10/19/2021 03:05 PM    VLDL, calculated 28 06/22/2022 12:00 AM    VLDL, calculated 20.2 10/19/2021 03:05 PM    Triglyceride 164 (H) 06/22/2022 12:00 AM    CHOL/HDL Ratio 2.4 10/19/2021 03:05 PM      Lab Results   Component Value Date/Time    ALT (SGPT) 15 06/22/2022 12:00 AM    AST (SGOT) 18 06/22/2022 12:00 AM    Alk. phosphatase 91 06/22/2022 12:00 AM    Bilirubin, total 0.5 06/22/2022 12:00 AM        (See attached Problem visit note if applicable)    Menstrual/Sexual History:  Age at which menses began: 13 y.o. Post menopausal 3-4 years    PAP History: Normal    No obstetric history on file. Sexually active: No    Risk factors for breast cancer: No    Diet/Exercise History:  Diet: Favorite food [employer]. - Does patient eat at least 5 servings of Fruits/vegetables daily? No   - Is patient currently dieting? No    Exercise: Patient does not have structured exercise    Healthcare maintenance:   Health Maintenance Due   Topic Date Due    COVID-19 Vaccine (1) Never done    DTaP/Tdap/Td series (1 - Tdap) Never done    Cervical cancer screen  01/21/2012    Shingrix Vaccine Age 50> (1 of 2) Never done    Breast Cancer Screen Mammogram  Never done     Mammogram indicated? Yes  Colonoscopy indicated? Yes  DEXA scan indicated? No   HIV/STI testing indicated? No   Hepatitis C testing indicated? No   Lung cancer screening indicated? No   AAA screening indicated? No     Immunization History   Administered Date(s) Administered    H1N1 Influenza Virus Vaccine 01/14/2010    Influenza, FLUARIX, FLULAVAL, FLUZONE (age 10 mo+) AND AFLURIA, (age 1 y+), PF, 0.5mL 11/18/2016, 10/05/2017, 10/04/2018, 10/19/2021, 09/22/2022     Flu indicated? Yes  Tdap indicated? Yes  Pneumovax indicated? No   Zostervax indicated?  Yes  Meningococcal indicated? No        Health Maintenance  Health Maintenance Due   Topic Date Due    COVID-19 Vaccine (1) Never done    DTaP/Tdap/Td series (1 - Tdap) Never done    Cervical cancer screen  01/21/2012    Shingrix Vaccine Age 50> (1 of 2) Never done    Breast Cancer Screen Mammogram  Never done       Past Medical, Family, and Social History:     Current Outpatient Medications on File Prior to Visit   Medication Sig Dispense Refill    naproxen (NAPROSYN) 500 mg tablet TAKE 1 TABLET BY MOUTH TWICE DAILY WITH MEALS 60 Tablet 0    traZODone (DESYREL) 50 mg tablet Take 1 Tablet by mouth nightly. 60 Tablet 1    spironolactone (ALDACTONE) 25 mg tablet Take 1 Tablet by mouth daily. 90 Tablet 1    omeprazole (PRILOSEC) 20 mg capsule Take 1 Capsule by mouth daily. 90 Capsule 1    metoprolol tartrate (LOPRESSOR) 50 mg tablet Take 1 Tablet by mouth two (2) times a day. 180 Tablet 1    lovastatin (MEVACOR) 20 mg tablet Take 1 Tablet by mouth daily. 90 Tablet 1    lisinopriL (PRINIVIL, ZESTRIL) 40 mg tablet Take 1 Tablet by mouth daily. 90 Tablet 1    hydroCHLOROthiazide (HYDRODIURIL) 25 mg tablet Take 1 Tablet by mouth daily. 90 Tablet 1    ergocalciferol (ERGOCALCIFEROL) 1,250 mcg (50,000 unit) capsule Take 1 capsule by mouth once a week 12 Capsule 1    citalopram (CELEXA) 20 mg tablet Take 1 Tablet by mouth daily. 90 Tablet 1    aspirin delayed-release 81 mg tablet Take 1 Tablet by mouth daily. 90 Tablet 1    MULTIVITAMIN WITH MINERALS (ONE-A-DAY 50 PLUS PO) Take  by mouth. No current facility-administered medications on file prior to visit. Patient Active Problem List   Diagnosis Code    Essential hypertension I10    Depression F32. A    Anxiety F41.9    Vitamin D deficiency E55.9    Hyperlipidemia E78.5    GERD (gastroesophageal reflux disease) K21.9    Partial edentulism K08.409       Social History     Socioeconomic History    Marital status: SINGLE   Tobacco Use    Smoking status: Never    Smokeless tobacco: Never Substance and Sexual Activity    Alcohol use: Yes     Comment: 2 beers every other day    Drug use: No    Sexual activity: Yes     Social Determinants of Health     Financial Resource Strain: Medium Risk    Difficulty of Paying Living Expenses: Somewhat hard   Food Insecurity: Food Insecurity Present    Worried About Running Out of Food in the Last Year: Sometimes true    Ran Out of Food in the Last Year: Sometimes true        Review of Systems   Review of Systems   Constitutional:  Negative for fever. Cardiovascular:  Negative for chest pain and palpitations. Gastrointestinal:  Negative for heartburn. Neurological:  Negative for dizziness, tingling and headaches. Objective:   Visit Vitals  /68 (BP 1 Location: Right arm, BP Patient Position: Sitting, BP Cuff Size: Adult)   Pulse 74   Temp 98.4 °F (36.9 °C) (Oral)   Resp 18   Ht 4' 9\" (1.448 m)   Wt 140 lb (63.5 kg)   LMP 04/12/2017   SpO2 98%   BMI 30.30 kg/m²        Physical Exam  Vitals and nursing note reviewed. Constitutional:       Appearance: Normal appearance. HENT:      Head: Normocephalic and atraumatic. Cardiovascular:      Rate and Rhythm: Normal rate and regular rhythm. Pulses: Normal pulses. Heart sounds: Normal heart sounds. No murmur heard. No friction rub. No gallop. Pulmonary:      Effort: Pulmonary effort is normal.      Breath sounds: Normal breath sounds. Abdominal:      General: Abdomen is flat. Bowel sounds are normal.      Palpations: Abdomen is soft. Musculoskeletal:      Cervical back: Normal range of motion and neck supple. Skin:     General: Skin is warm and dry. Neurological:      Mental Status: She is alert. Pertinent Labs/Studies:      Assessment and orders:       ICD-10-CM ICD-9-CM    1. Well woman exam with routine gynecological exam  Z01.419 V72.31       2.  Encounter for immunization  Z23 V03.89 INFLUENZA, FLUARIX, FLULAVAL, FLUZONE (AGE 6 MO+), AFLURIA(AGE 3Y+) IM, PF, 0.5 ML HI IMMUNIZ ADMIN,1 SINGLE/COMB VAC/TOXOID      3. Essential hypertension  I10 401.9 CBC W/O DIFF      METABOLIC PANEL, COMPREHENSIVE      METABOLIC PANEL, COMPREHENSIVE      CBC W/O DIFF      4. Gastroesophageal reflux disease without esophagitis  K21.9 530.81       5. Recurrent major depressive disorder, in full remission ([facility]  F33.42 296.36       6. Mixed hyperlipidemia  E78.2 272.2 LIPID PANEL      LIPID PANEL      7. Vitamin D deficiency  E55.9 268.9 VITAMIN D, 25 HYDROXY      VITAMIN D, 25 HYDROXY      8. Cervical cancer screening  Z12.4 V76.2 PAP IG, APTIMA HPV AND RFX 16/18,45 ([id number])      PAP IG, APTIMA HPV AND RFX 16/18,45 ([id number])        Diagnoses and all orders for this visit:    1. Well woman exam with routine gynecological exam    2. Encounter for immunization  -     INFLUENZA, FLUARIX, FLULAVAL, FLUZONE (AGE 6 MO+), AFLURIA(AGE 3Y+) IM, PF, 0.5 ML  -     HI IMMUNIZ ADMIN,1 SINGLE/COMB VAC/TOXOID    3. Essential hypertension  -     CBC W/O DIFF; Future  -     METABOLIC PANEL, COMPREHENSIVE; Future    4. Gastroesophageal reflux disease without esophagitis    5. Recurrent major depressive [facility]    6. Mixed hyperlipidemia  -     LIPID PANEL; Future    7. Vitamin D deficiency  -     VITAMIN D, 25 HYDROXY; Future    8. Cervical cancer screening  -     PAP IG, APTIMA HPV AND RFX 16/18,45 ([id number]); Future          I have discussed the diagnosis with the patient and the intended plan as seen in the above orders. Social history, medical history, and labs were reviewed. The patient has received an after-visit summary and questions were answered concerning future plans. I have discussed medication side effects and warnings with the patient as well.     MD SHALINI Sesay & CARLOS TSE [facility]  09/22/22

## 2022-09-24 LAB
25(OH)D3+25(OH)D2 SERPL-MCNC: 90.2 NG/ML (ref 30–100)
ALBUMIN SERPL-MCNC: 5 G/DL (ref 3.8–4.9)
ALBUMIN/GLOB SERPL: 1.8 {RATIO} (ref 1.2–2.2)
ALP SERPL-CCNC: 84 IU/L (ref 44–121)
ALT SERPL-CCNC: 11 IU/L (ref 0–32)
AST SERPL-CCNC: 19 IU/L (ref 0–40)
BILIRUB SERPL-MCNC: 0.5 MG/DL (ref 0–1.2)
BUN SERPL-MCNC: 12 MG/DL (ref 6–24)
BUN/CREAT SERPL: 11 (ref 9–23)
CALCIUM SERPL-MCNC: 9.8 MG/DL (ref 8.7–10.2)
CHLORIDE SERPL-SCNC: 101 MMOL/L (ref 96–106)
CHOLEST SERPL-MCNC: 197 MG/DL (ref 100–199)
CO2 SERPL-SCNC: 19 MMOL/L (ref 20–29)
CREAT SERPL-MCNC: 1.1 MG/DL (ref 0.57–1)
EGFR: 58 ML/MIN/1.73
ERYTHROCYTE [DISTWIDTH] IN BLOOD BY AUTOMATED COUNT: 15.2 % (ref 11.7–15.4)
GLOBULIN SER CALC-MCNC: 2.8 G/DL (ref 1.5–4.5)
GLUCOSE SERPL-MCNC: 101 MG/DL (ref 65–99)
HCT VFR BLD AUTO: 34.2 % (ref 34–46.6)
HDLC SERPL-MCNC: 71 MG/DL
HGB BLD-MCNC: 10.6 G/DL (ref 11.1–15.9)
LDLC SERPL CALC-MCNC: 103 MG/DL (ref 0–99)
MCH RBC QN AUTO: 30.6 PG (ref 26.6–33)
MCHC RBC AUTO-ENTMCNC: 31 G/DL (ref 31.5–35.7)
MCV RBC AUTO: 99 FL (ref 79–97)
PLATELET # BLD AUTO: 221 X10E3/UL (ref 150–450)
POTASSIUM SERPL-SCNC: 5 MMOL/L (ref 3.5–5.2)
PROT SERPL-MCNC: 7.8 G/DL (ref 6–8.5)
RBC # BLD AUTO: 3.46 X10E6/UL (ref 3.77–5.28)
SODIUM SERPL-SCNC: 139 MMOL/L (ref 134–144)
TRIGL SERPL-MCNC: 130 MG/DL (ref 0–149)
VLDLC SERPL CALC-MCNC: 23 MG/DL (ref 5–40)
WBC # BLD AUTO: 7.9 X10E3/UL (ref 3.4–10.8)

## 2022-09-28 LAB
CYTOLOGIST CVX/VAG CYTO: NORMAL
CYTOLOGY CVX/VAG DOC CYTO: NORMAL
CYTOLOGY CVX/VAG DOC THIN PREP: NORMAL
DX ICD CODE: NORMAL
HPV I/H RISK 4 DNA CVX QL PROBE+SIG AMP: NEGATIVE
Lab: NORMAL
OTHER STN SPEC: NORMAL
STAT OF ADQ CVX/VAG CYTO-IMP: NORMAL

## 2022-10-06 ENCOUNTER — TELEPHONE (OUTPATIENT)
Dept: FAMILY MEDICINE CLINIC | Age: 57
End: 2022-10-06

## 2022-11-30 ENCOUNTER — NURSE TRIAGE (OUTPATIENT)
Dept: OTHER | Facility: CLINIC | Age: 57
End: 2022-11-30

## 2022-11-30 NOTE — TELEPHONE ENCOUNTER
Location of patient: [address] [place] at [facility] with [email or web address]. Subjective: Caller states \"Weakness\"     Current Symptoms:   weakness in lower extremities when walking   Denies CP,SOB, n/v/d, and any areas of pain  Denies dizziness, lightheaded, fainting       Onset: 1 week ago; gradual    Associated Symptoms: NA    Pain Severity: 0/10; Temperature: denies      What has been tried: nothing    LMP:  N/A  Pregnant: NA    Recommended disposition: See in Office Today    Care advice provided, patient verbalizes understanding; denies any other questions or concerns; instructed to call back for any new or worsening symptoms. Patient/Caller agrees with recommended disposition; writer provided warm transfer to [place] at [facility] for appointment scheduling    Attention Provider: Thank you for allowing me to participate in the care of your patient. The patient was connected to triage in response to information provided to the [facility]. Please do not respond through this encounter as the response is not directed to a shared pool.       Reason for Disposition   Taking a medicine that could cause weakness (e.g., blood pressure medications, diuretics)    Protocols used: Weakness (Generalized) and Fatigue-ADULT-[place]

## 2022-12-27 DIAGNOSIS — F33.42 RECURRENT MAJOR DEPRESSIVE DISORDER, IN FULL REMISSION (HCC): ICD-10-CM

## 2022-12-28 RX ORDER — CITALOPRAM 20 MG/1
TABLET, FILM COATED ORAL
Qty: 90 TABLET | Refills: 0 | Status: SHIPPED | OUTPATIENT
Start: 2022-12-28

## 2023-01-08 DIAGNOSIS — I10 ESSENTIAL HYPERTENSION: ICD-10-CM

## 2023-01-09 RX ORDER — LISINOPRIL 40 MG/1
TABLET ORAL
Qty: 90 TABLET | Refills: 0 | Status: SHIPPED | OUTPATIENT
Start: 2023-01-09

## 2023-01-23 RX ORDER — NAPROXEN 500 MG/1
TABLET ORAL
Qty: 60 TABLET | Refills: 0 | Status: SHIPPED | OUTPATIENT
Start: 2023-01-23

## 2023-02-05 DIAGNOSIS — I10 ESSENTIAL HYPERTENSION: ICD-10-CM

## 2023-02-05 DIAGNOSIS — E78.2 MIXED HYPERLIPIDEMIA: ICD-10-CM

## 2023-02-06 RX ORDER — LOVASTATIN 20 MG/1
TABLET ORAL
Qty: 90 TABLET | Refills: 0 | Status: SHIPPED | OUTPATIENT
Start: 2023-02-06

## 2023-02-06 RX ORDER — ASPIRIN 81 MG/1
TABLET ORAL
Qty: 90 TABLET | Refills: 0 | Status: SHIPPED | OUTPATIENT
Start: 2023-02-06

## 2023-03-08 DIAGNOSIS — I10 ESSENTIAL HYPERTENSION: ICD-10-CM

## 2023-03-08 RX ORDER — LISINOPRIL 40 MG/1
TABLET ORAL
Qty: 90 TABLET | Refills: 0 | Status: SHIPPED | OUTPATIENT
Start: 2023-03-08

## 2023-03-17 ENCOUNTER — TELEPHONE (OUTPATIENT)
Dept: FAMILY MEDICINE CLINIC | Age: 58
End: 2023-03-17

## 2023-03-20 DIAGNOSIS — I10 ESSENTIAL HYPERTENSION: ICD-10-CM

## 2023-03-20 RX ORDER — SPIRONOLACTONE 25 MG/1
TABLET ORAL
Qty: 90 TABLET | Refills: 0 | Status: SHIPPED | OUTPATIENT
Start: 2023-03-20

## 2023-03-24 ENCOUNTER — OFFICE VISIT (OUTPATIENT)
Dept: FAMILY MEDICINE CLINIC | Age: 58
End: 2023-03-24

## 2023-03-24 VITALS
BODY MASS INDEX: 30.12 KG/M2 | SYSTOLIC BLOOD PRESSURE: 109 MMHG | DIASTOLIC BLOOD PRESSURE: 69 MMHG | HEART RATE: 77 BPM | RESPIRATION RATE: 18 BRPM | OXYGEN SATURATION: 97 % | WEIGHT: 139.6 LBS | TEMPERATURE: 98.6 F | HEIGHT: 57 IN

## 2023-03-24 DIAGNOSIS — E78.2 MIXED HYPERLIPIDEMIA: ICD-10-CM

## 2023-03-24 DIAGNOSIS — I10 ESSENTIAL HYPERTENSION: ICD-10-CM

## 2023-03-24 DIAGNOSIS — E55.9 VITAMIN D DEFICIENCY: ICD-10-CM

## 2023-03-24 DIAGNOSIS — F33.42 RECURRENT MAJOR DEPRESSIVE DISORDER, IN FULL REMISSION (HCC): ICD-10-CM

## 2023-03-24 DIAGNOSIS — F41.9 ANXIETY: Primary | ICD-10-CM

## 2023-03-24 PROCEDURE — 3074F SYST BP LT 130 MM HG: CPT | Performed by: FAMILY MEDICINE

## 2023-03-24 PROCEDURE — 99214 OFFICE O/P EST MOD 30 MIN: CPT | Performed by: FAMILY MEDICINE

## 2023-03-24 PROCEDURE — 3078F DIAST BP <80 MM HG: CPT | Performed by: FAMILY MEDICINE

## 2023-03-24 RX ORDER — LISINOPRIL 40 MG/1
40 TABLET ORAL DAILY
Qty: 90 TABLET | Refills: 1 | Status: SHIPPED | OUTPATIENT
Start: 2023-03-24

## 2023-03-24 RX ORDER — HYDROCHLOROTHIAZIDE 25 MG/1
25 TABLET ORAL DAILY
Qty: 90 TABLET | Refills: 1 | Status: SHIPPED | OUTPATIENT
Start: 2023-03-24

## 2023-03-24 RX ORDER — ERGOCALCIFEROL 1.25 MG/1
CAPSULE ORAL
Qty: 12 CAPSULE | Refills: 1 | Status: SHIPPED | OUTPATIENT
Start: 2023-03-24

## 2023-03-24 RX ORDER — CITALOPRAM 20 MG/1
20 TABLET, FILM COATED ORAL DAILY
Qty: 90 TABLET | Refills: 1 | Status: SHIPPED | OUTPATIENT
Start: 2023-03-24

## 2023-03-24 NOTE — PROGRESS NOTES
1. Have you been to the ER, urgent care clinic since your last visit? Hospitalized since your last visit? No    2. Have you seen or consulted any other health care providers outside of the [address] [place] [address] since your last visit? Including any pap smears or colon screening.  No      Health Maintenance Due   Topic Date Due    COVID-19 Vaccine (1) Never done    DTaP/Tdap/Td series (1 - Tdap) Never done    Shingles Vaccine (1 of 2) Never done    Breast Cancer Screen Mammogram  Never done

## 2023-03-24 NOTE — PROGRESS NOTES
[facility]    History of Present Illness:   Aleena Horn is a 59 y.o. female with history of HTN, GERD, Depression and Anxiety, HLD, Vitamin D deficicency  CC: Follow up  History provided by patient and Records    HPI:  Hypertension Follow up:  Currently Taking:   Key CAD CHF Meds               lisinopriL (PRINIVIL, ZESTRIL) 40 mg tablet (Taking) Take 1 Tablet by mouth daily. hydroCHLOROthiazide (HYDRODIURIL) 25 mg tablet (Taking) Take 1 Tablet by mouth daily. spironolactone (ALDACTONE) 25 mg tablet (Taking) Take 1 tablet by mouth once daily    aspirin delayed-release 81 mg tablet (Taking) Take 1 tablet by mouth once daily    lovastatin (MEVACOR) 20 mg tablet (Taking) Take 1 tablet by mouth once daily    metoprolol tartrate (LOPRESSOR) 50 mg tablet (Taking) Take 1 Tablet by mouth two (2) times a day. The patient reports:  taking medications as instructed, no medication side effects noted, no TIA's, no chest pain on exertion, no dyspnea on exertion, no swelling of ankles. BP Readings from Last 3 Encounters:   03/24/23 109/69   09/22/22 108/68   08/16/22 110/71      Gastroesophageal Reflux:  Current control of Symptoms: Controlled  Primary symptoms: heartburn  Hiatal Hernia: No  Current Medications: Prilosec  The patient has no history melena or bright red blood in the stools. The patient avoids high dose aspirin and NSAID therapy. The patient is aware of diet changes needed, elevating the head of the bed and appropriate use of antacids. Depression/Anxiety: well controlled    Hypertriglyceridemia Follow up:   Cardiovascular risks for her are: hypertension  hyperlipidemia.    Current Medications:  Key Antihyperlipidemia Meds               lovastatin (MEVACOR) 20 mg tablet (Taking) Take 1 tablet by mouth once daily            Compliance: YES   Myalgias: NO   Fatigue: NO   Other side effects: NO     Wt Readings from Last 3 Encounters:   03/24/23 139 lb 9.6 oz (63.3 kg) 09/22/22 140 lb (63.5 kg)   08/16/22 139 lb 3.2 oz (63.1 kg)       Lab Results   Component Value Date/Time    Cholesterol, total 197 09/22/2022 12:00 AM    HDL Cholesterol 71 09/22/2022 12:00 AM    LDL, calculated 103 (H) 09/22/2022 12:00 AM    LDL, calculated 85.8 10/19/2021 03:05 PM    VLDL, calculated 23 09/22/2022 12:00 AM    VLDL, calculated 20.2 10/19/2021 03:05 PM    Triglyceride 130 09/22/2022 12:00 AM    CHOL/HDL Ratio 2.4 10/19/2021 03:05 PM      Lab Results   Component Value Date/Time    ALT (SGPT) 11 09/22/2022 12:00 AM    AST (SGOT) 19 09/22/2022 12:00 AM    Alk. phosphatase 84 09/22/2022 12:00 AM    Bilirubin, total 0.5 09/22/2022 12:00 AM           Health Maintenance  Health Maintenance Due   Topic Date Due    COVID-19 Vaccine (1) Never done    DTaP/Tdap/Td series (1 - Tdap) Never done    Shingles Vaccine (1 of 2) Never done    Breast Cancer Screen Mammogram  Never done       Past Medical, Family, and Social History:     Current Outpatient Medications on File Prior to Visit   Medication Sig Dispense Refill    spironolactone (ALDACTONE) 25 mg tablet Take 1 tablet by mouth once daily 90 Tablet 0    omeprazole (PRILOSEC) 20 mg capsule Take 1 Capsule by mouth daily. Please schedule follow up appt. 90 Capsule 0    aspirin delayed-release 81 mg tablet Take 1 tablet by mouth once daily 90 Tablet 0    lovastatin (MEVACOR) 20 mg tablet Take 1 tablet by mouth once daily 90 Tablet 0    naproxen (NAPROSYN) 500 mg tablet TAKE 1 TABLET BY MOUTH TWICE DAILY WITH MEALS 60 Tablet 0    traZODone (DESYREL) 50 mg tablet Take 1 Tablet by mouth nightly. 60 Tablet 1    metoprolol tartrate (LOPRESSOR) 50 mg tablet Take 1 Tablet by mouth two (2) times a day. 180 Tablet 1    MULTIVITAMIN WITH MINERALS (ONE-A-DAY 50 PLUS PO) Take  by mouth.       [DISCONTINUED] lisinopriL (PRINIVIL, ZESTRIL) 40 mg tablet Take 1 tablet by mouth once daily 90 Tablet 0    [DISCONTINUED] hydroCHLOROthiazide (HYDRODIURIL) 25 mg tablet Take 1 tablet by mouth once daily 90 Tablet 0    [DISCONTINUED] citalopram (CELEXA) 20 mg tablet Take 1 tablet by mouth once daily 90 Tablet 0    [DISCONTINUED] ergocalciferol (Vitamin D2) 1,250 mcg (50,000 unit) capsule Take 1 capsule by mouth once a week 12 Capsule 1     No current facility-administered medications on file prior to visit. Patient Active Problem List   Diagnosis Code    Essential hypertension I10    Depression F32. A    Anxiety F41.9    Vitamin D deficiency E55.9    Hyperlipidemia E78.5    GERD (gastroesophageal reflux disease) K21.9    Partial edentulism K08.409       Social History     Socioeconomic History    Marital status: SINGLE   Tobacco Use    Smoking status: Never    Smokeless tobacco: Never   Substance and Sexual Activity    Alcohol use: Yes     Comment: 2 beers every other day    Drug use: No    Sexual activity: Yes     Social Determinants of Health     Financial Resource Strain: Medium Risk    Difficulty of Paying Living Expenses: Somewhat hard   Food Insecurity: Food Insecurity Present    Worried About Running Out of Food in the Last Year: Sometimes true    Ran Out of Food in the Last Year: Sometimes true        Review of Systems   Review of Systems   Constitutional:  Negative for chills and fever. Cardiovascular:  Negative for chest pain and palpitations. Gastrointestinal:  Negative for abdominal pain, nausea and vomiting. Objective:   Visit Vitals  /69 (BP 1 Location: Left arm, BP Patient Position: Sitting, BP Cuff Size: Adult)   Pulse 77   Temp 98.6 °F (37 °C) (Oral)   Resp 18   Ht 4' 9\" (1.448 m)   Wt 139 lb 9.6 oz (63.3 kg)   LMP 04/12/2017   SpO2 97%   BMI 30.21 kg/m²        Physical Exam  Vitals and nursing note reviewed. Constitutional:       Appearance: Normal appearance. HENT:      Head: Normocephalic and atraumatic. Cardiovascular:      Rate and Rhythm: Normal rate and regular rhythm. Pulses: Normal pulses. Heart sounds: Normal heart sounds.  No murmur heard.    No friction rub. No gallop. Pulmonary:      Effort: Pulmonary effort is normal.      Breath sounds: Normal breath sounds. Abdominal:      General: Abdomen is flat. Palpations: Abdomen is soft. Musculoskeletal:      Cervical back: Normal range of motion and neck supple. Neurological:      Mental Status: She is alert. Pertinent Labs/Studies:      Assessment and orders:       ICD-10-CM ICD-9-CM    1. Anxiety  F41.9 300.00       2. Essential hypertension  I10 401.9 lisinopriL (PRINIVIL, ZESTRIL) 40 mg tablet      hydroCHLOROthiazide (HYDRODIURIL) 25 mg tablet      3. Recurrent major depressive disorder, in full remission ([facility] [address]  F33.42 296.36 citalopram (CELEXA) 20 mg tablet      4. Vitamin D deficiency  E55.9 268.9 ergocalciferol (Vitamin D2) 1,250 mcg (50,000 unit) capsule      5. Mixed hyperlipidemia  E78.2 272.2         Diagnoses and all orders for this visit:    1. Anxiety    2. Essential hypertension  -     lisinopriL (PRINIVIL, ZESTRIL) 40 mg tablet; Take 1 Tablet by mouth daily. -     hydroCHLOROthiazide (HYDRODIURIL) 25 mg tablet; Take 1 Tablet by mouth daily. 3. Recurrent major depressive disorder, in full remission ([facility] [address]  -     citalopram (CELEXA) 20 mg tablet; Take 1 Tablet by mouth daily. 4. Vitamin D deficiency  -     ergocalciferol (Vitamin D2) 1,250 mcg (50,000 unit) capsule; Take 1 capsule by mouth once a week    5. Mixed hyperlipidemia    Follow-up and Dispositions    Return in about 3 months (around 6/24/2023). I have discussed the diagnosis with the patient and the intended plan as seen in the above orders. Social history, medical history, and labs were reviewed. The patient has received an after-visit summary and questions were answered concerning future plans. I have discussed medication side effects and warnings with the patient as well.     MD SHALINI Rojo & CARLOS TSE [facility]  03/24/23

## 2023-05-09 RX ORDER — LOVASTATIN 20 MG/1
TABLET ORAL
Qty: 90 TABLET | Refills: 1 | Status: SHIPPED | OUTPATIENT
Start: 2023-05-09

## 2023-05-10 RX ORDER — HYDROGEN PEROXIDE 2.65 ML/100ML
LIQUID ORAL; TOPICAL
Qty: 90 TABLET | Refills: 1 | Status: SHIPPED | OUTPATIENT
Start: 2023-05-10

## 2023-06-06 RX ORDER — NAPROXEN 500 MG/1
TABLET ORAL
Qty: 60 TABLET | Refills: 1 | Status: SHIPPED | OUTPATIENT
Start: 2023-06-06

## 2023-06-09 RX ORDER — SPIRONOLACTONE 25 MG/1
TABLET ORAL
Qty: 90 TABLET | Refills: 1 | Status: SHIPPED | OUTPATIENT
Start: 2023-06-09

## 2023-06-26 ENCOUNTER — OFFICE VISIT (OUTPATIENT)
Facility: CLINIC | Age: 58
End: 2023-06-26
Payer: MEDICARE

## 2023-06-26 VITALS
BODY MASS INDEX: 30.42 KG/M2 | HEIGHT: 57 IN | OXYGEN SATURATION: 96 % | SYSTOLIC BLOOD PRESSURE: 111 MMHG | HEART RATE: 72 BPM | WEIGHT: 141 LBS | RESPIRATION RATE: 18 BRPM | DIASTOLIC BLOOD PRESSURE: 66 MMHG | TEMPERATURE: 98.9 F

## 2023-06-26 DIAGNOSIS — K21.9 GASTROESOPHAGEAL REFLUX DISEASE WITHOUT ESOPHAGITIS: ICD-10-CM

## 2023-06-26 DIAGNOSIS — E55.9 VITAMIN D DEFICIENCY: ICD-10-CM

## 2023-06-26 DIAGNOSIS — E78.2 MIXED HYPERLIPIDEMIA: ICD-10-CM

## 2023-06-26 DIAGNOSIS — I10 ESSENTIAL HYPERTENSION: Primary | ICD-10-CM

## 2023-06-26 DIAGNOSIS — K04.7 DENTAL INFECTION: ICD-10-CM

## 2023-06-26 DIAGNOSIS — F41.9 ANXIETY: ICD-10-CM

## 2023-06-26 PROCEDURE — 3078F DIAST BP <80 MM HG: CPT | Performed by: FAMILY MEDICINE

## 2023-06-26 PROCEDURE — 99214 OFFICE O/P EST MOD 30 MIN: CPT | Performed by: FAMILY MEDICINE

## 2023-06-26 PROCEDURE — 3074F SYST BP LT 130 MM HG: CPT | Performed by: FAMILY MEDICINE

## 2023-06-26 RX ORDER — AMOXICILLIN AND CLAVULANATE POTASSIUM 875; 125 MG/1; MG/1
1 TABLET, FILM COATED ORAL 2 TIMES DAILY
Qty: 20 TABLET | Refills: 0 | Status: SHIPPED | OUTPATIENT
Start: 2023-06-26 | End: 2023-07-06

## 2023-06-26 RX ORDER — DICLOFENAC SODIUM 75 MG/1
75 TABLET, DELAYED RELEASE ORAL 2 TIMES DAILY
Qty: 60 TABLET | Refills: 3 | Status: SHIPPED | OUTPATIENT
Start: 2023-06-26

## 2023-06-26 SDOH — ECONOMIC STABILITY: TRANSPORTATION INSECURITY
IN THE PAST 12 MONTHS, HAS LACK OF TRANSPORTATION KEPT YOU FROM MEETINGS, WORK, OR FROM GETTING THINGS NEEDED FOR DAILY LIVING?: NO

## 2023-06-26 SDOH — ECONOMIC STABILITY: INCOME INSECURITY: IN THE LAST 12 MONTHS, WAS THERE A TIME WHEN YOU WERE NOT ABLE TO PAY THE MORTGAGE OR RENT ON TIME?: NO

## 2023-06-26 SDOH — ECONOMIC STABILITY: HOUSING INSECURITY
IN THE LAST 12 MONTHS, WAS THERE A TIME WHEN YOU DID NOT HAVE A STEADY PLACE TO SLEEP OR SLEPT IN A SHELTER (INCLUDING NOW)?: NO

## 2023-06-26 SDOH — ECONOMIC STABILITY: TRANSPORTATION INSECURITY
IN THE PAST 12 MONTHS, HAS THE LACK OF TRANSPORTATION KEPT YOU FROM MEDICAL APPOINTMENTS OR FROM GETTING MEDICATIONS?: YES

## 2023-06-26 ASSESSMENT — ENCOUNTER SYMPTOMS
APNEA: 0
CHEST TIGHTNESS: 0

## 2023-06-27 ENCOUNTER — TELEPHONE (OUTPATIENT)
Facility: CLINIC | Age: 58
End: 2023-06-27

## 2023-06-27 DIAGNOSIS — I10 ESSENTIAL HYPERTENSION: Primary | ICD-10-CM

## 2023-06-27 DIAGNOSIS — E55.9 VITAMIN D DEFICIENCY: ICD-10-CM

## 2023-06-27 LAB
25(OH)D3 SERPL-MCNC: 118 NG/ML (ref 30–100)
ALBUMIN SERPL-MCNC: 4.4 G/DL (ref 3.5–5)
ALBUMIN/GLOB SERPL: 1.3 (ref 1.1–2.2)
ALP SERPL-CCNC: 90 U/L (ref 45–117)
ALT SERPL-CCNC: 14 U/L (ref 12–78)
ANION GAP SERPL CALC-SCNC: 6 MMOL/L (ref 5–15)
AST SERPL-CCNC: 16 U/L (ref 15–37)
BILIRUB SERPL-MCNC: 0.8 MG/DL (ref 0.2–1)
BUN SERPL-MCNC: 27 MG/DL (ref 6–20)
BUN/CREAT SERPL: 18 (ref 12–20)
CALCIUM SERPL-MCNC: 9.6 MG/DL (ref 8.5–10.1)
CHLORIDE SERPL-SCNC: 108 MMOL/L (ref 97–108)
CHOLEST SERPL-MCNC: 182 MG/DL
CO2 SERPL-SCNC: 23 MMOL/L (ref 21–32)
CREAT SERPL-MCNC: 1.52 MG/DL (ref 0.55–1.02)
ERYTHROCYTE [DISTWIDTH] IN BLOOD BY AUTOMATED COUNT: 14.9 % (ref 11.5–14.5)
GLOBULIN SER CALC-MCNC: 3.5 G/DL (ref 2–4)
GLUCOSE SERPL-MCNC: 93 MG/DL (ref 65–100)
HCT VFR BLD AUTO: 33.7 % (ref 35–47)
HDLC SERPL-MCNC: 71 MG/DL
HDLC SERPL: 2.6 (ref 0–5)
HGB BLD-MCNC: 9.9 G/DL (ref 11.5–16)
LDLC SERPL CALC-MCNC: 92.4 MG/DL (ref 0–100)
MCH RBC QN AUTO: 29 PG (ref 26–34)
MCHC RBC AUTO-ENTMCNC: 29.4 G/DL (ref 30–36.5)
MCV RBC AUTO: 98.8 FL (ref 80–99)
NRBC # BLD: 0 K/UL (ref 0–0.01)
NRBC BLD-RTO: 0 PER 100 WBC
PLATELET # BLD AUTO: 157 K/UL (ref 150–400)
POTASSIUM SERPL-SCNC: 5.6 MMOL/L (ref 3.5–5.1)
PROT SERPL-MCNC: 7.9 G/DL (ref 6.4–8.2)
RBC # BLD AUTO: 3.41 M/UL (ref 3.8–5.2)
SODIUM SERPL-SCNC: 137 MMOL/L (ref 136–145)
TRIGL SERPL-MCNC: 93 MG/DL
VLDLC SERPL CALC-MCNC: 18.6 MG/DL
WBC # BLD AUTO: 8 K/UL (ref 3.6–11)

## 2023-07-21 ENCOUNTER — NURSE ONLY (OUTPATIENT)
Facility: CLINIC | Age: 58
End: 2023-07-21

## 2023-07-21 DIAGNOSIS — I10 ESSENTIAL HYPERTENSION: ICD-10-CM

## 2023-07-21 DIAGNOSIS — E55.9 VITAMIN D DEFICIENCY: ICD-10-CM

## 2023-07-21 RX ORDER — OMEPRAZOLE 20 MG/1
CAPSULE, DELAYED RELEASE ORAL
Qty: 90 CAPSULE | Refills: 1 | Status: SHIPPED | OUTPATIENT
Start: 2023-07-21

## 2023-07-22 LAB
25(OH)D3 SERPL-MCNC: 111.9 NG/ML (ref 30–100)
ANION GAP SERPL CALC-SCNC: 4 MMOL/L (ref 5–15)
BUN SERPL-MCNC: 20 MG/DL (ref 6–20)
BUN/CREAT SERPL: 17 (ref 12–20)
CALCIUM SERPL-MCNC: 9.7 MG/DL (ref 8.5–10.1)
CHLORIDE SERPL-SCNC: 104 MMOL/L (ref 97–108)
CO2 SERPL-SCNC: 26 MMOL/L (ref 21–32)
CREAT SERPL-MCNC: 1.18 MG/DL (ref 0.55–1.02)
GLUCOSE SERPL-MCNC: 99 MG/DL (ref 65–100)
POTASSIUM SERPL-SCNC: 4.2 MMOL/L (ref 3.5–5.1)
SODIUM SERPL-SCNC: 134 MMOL/L (ref 136–145)

## 2023-08-10 ENCOUNTER — TELEPHONE (OUTPATIENT)
Facility: CLINIC | Age: 58
End: 2023-08-10

## 2023-08-10 NOTE — TELEPHONE ENCOUNTER
Thanks for lissetti.     MD DAWNA Rico & ROBINSON ELAINE [facility]  08/10/23

## 2023-08-10 NOTE — TELEPHONE ENCOUNTER
Call placed to pt and informed her per  Your Kidney function has improved significantly. Your Vitamin D remains elevated though not severely. I would cut back the Vitamin D supplement a little for now.   Pt will cut vit d back to every other week

## 2023-08-10 NOTE — TELEPHONE ENCOUNTER
----- Message from Niall Clark sent at 8/10/2023  3:08 PM EDT -----  Subject: Results Request    QUESTIONS  Results: Blood work; Ordered by:  Abdoul Kim   Date Performed: 2023-07-21  ---------------------------------------------------------------------------  --------------  Valery MCACIN    [phone]; OK to leave message on voicemail  ---------------------------------------------------------------------------  --------------

## 2023-09-25 RX ORDER — METOPROLOL TARTRATE 50 MG/1
50 TABLET, FILM COATED ORAL 2 TIMES DAILY
Qty: 180 TABLET | Refills: 1 | Status: SHIPPED | OUTPATIENT
Start: 2023-09-25

## 2023-10-03 RX ORDER — CITALOPRAM 20 MG/1
TABLET ORAL
Qty: 90 TABLET | Refills: 0 | Status: SHIPPED | OUTPATIENT
Start: 2023-10-03

## 2023-10-19 RX ORDER — HYDROCHLOROTHIAZIDE 25 MG/1
TABLET ORAL
Qty: 90 TABLET | Refills: 0 | Status: SHIPPED | OUTPATIENT
Start: 2023-10-19

## 2023-11-02 RX ORDER — LOVASTATIN 20 MG/1
TABLET ORAL
Qty: 90 TABLET | Refills: 1 | Status: SHIPPED | OUTPATIENT
Start: 2023-11-02 | End: 2023-11-05 | Stop reason: SDUPTHER

## 2023-11-05 DIAGNOSIS — E78.2 MIXED HYPERLIPIDEMIA: Primary | ICD-10-CM

## 2023-11-05 RX ORDER — LOVASTATIN 20 MG/1
20 TABLET ORAL DAILY
Qty: 90 TABLET | Refills: 1 | Status: SHIPPED | OUTPATIENT
Start: 2023-11-05

## 2023-11-29 RX ORDER — HYDROGEN PEROXIDE 2.65 ML/100ML
LIQUID ORAL; TOPICAL
Qty: 90 TABLET | Refills: 0 | Status: SHIPPED | OUTPATIENT
Start: 2023-11-29

## 2023-12-11 RX ORDER — ERGOCALCIFEROL 1.25 MG/1
50000 CAPSULE ORAL WEEKLY
Qty: 4 CAPSULE | Refills: 0 | Status: SHIPPED | OUTPATIENT
Start: 2023-12-11 | End: 2024-01-17

## 2024-01-10 RX ORDER — CITALOPRAM 20 MG/1
20 TABLET ORAL DAILY
Qty: 30 TABLET | Refills: 0 | Status: SHIPPED | OUTPATIENT
Start: 2024-01-10

## 2024-01-17 RX ORDER — ERGOCALCIFEROL 1.25 MG/1
CAPSULE ORAL
Qty: 4 CAPSULE | Refills: 0 | Status: SHIPPED | OUTPATIENT
Start: 2024-01-17

## 2024-01-17 RX ORDER — HYDROCHLOROTHIAZIDE 25 MG/1
TABLET ORAL
Qty: 30 TABLET | Refills: 0 | Status: SHIPPED | OUTPATIENT
Start: 2024-01-17

## 2024-01-17 RX ORDER — LISINOPRIL 40 MG/1
TABLET ORAL
Qty: 30 TABLET | Refills: 0 | Status: SHIPPED | OUTPATIENT
Start: 2024-01-17

## 2024-01-22 RX ORDER — SPIRONOLACTONE 25 MG/1
TABLET ORAL
Qty: 30 TABLET | Refills: 0 | OUTPATIENT
Start: 2024-01-22

## 2024-01-30 ENCOUNTER — OFFICE VISIT (OUTPATIENT)
Facility: CLINIC | Age: 59
End: 2024-01-30
Payer: MEDICAID

## 2024-01-30 VITALS
RESPIRATION RATE: 18 BRPM | SYSTOLIC BLOOD PRESSURE: 128 MMHG | HEIGHT: 57 IN | OXYGEN SATURATION: 99 % | WEIGHT: 147 LBS | BODY MASS INDEX: 31.71 KG/M2 | HEART RATE: 67 BPM | DIASTOLIC BLOOD PRESSURE: 79 MMHG | TEMPERATURE: 97.4 F

## 2024-01-30 DIAGNOSIS — E78.2 MIXED HYPERLIPIDEMIA: ICD-10-CM

## 2024-01-30 DIAGNOSIS — I10 ESSENTIAL HYPERTENSION: Primary | ICD-10-CM

## 2024-01-30 DIAGNOSIS — F33.42 RECURRENT MAJOR DEPRESSIVE DISORDER, IN FULL REMISSION (HCC): ICD-10-CM

## 2024-01-30 DIAGNOSIS — E55.9 VITAMIN D DEFICIENCY: ICD-10-CM

## 2024-01-30 DIAGNOSIS — K21.9 GASTROESOPHAGEAL REFLUX DISEASE WITHOUT ESOPHAGITIS: ICD-10-CM

## 2024-01-30 DIAGNOSIS — R53.82 CHRONIC FATIGUE: ICD-10-CM

## 2024-01-30 DIAGNOSIS — M54.50 ACUTE RIGHT-SIDED LOW BACK PAIN WITHOUT SCIATICA: ICD-10-CM

## 2024-01-30 PROCEDURE — 3074F SYST BP LT 130 MM HG: CPT | Performed by: FAMILY MEDICINE

## 2024-01-30 PROCEDURE — 99214 OFFICE O/P EST MOD 30 MIN: CPT | Performed by: FAMILY MEDICINE

## 2024-01-30 PROCEDURE — 3078F DIAST BP <80 MM HG: CPT | Performed by: FAMILY MEDICINE

## 2024-01-30 RX ORDER — CYCLOBENZAPRINE HCL 5 MG
5 TABLET ORAL 3 TIMES DAILY PRN
Qty: 30 TABLET | Refills: 0 | Status: SHIPPED | OUTPATIENT
Start: 2024-01-30 | End: 2024-02-09

## 2024-01-30 RX ORDER — SPIRONOLACTONE 25 MG/1
25 TABLET ORAL DAILY
Qty: 90 TABLET | Refills: 1 | Status: SHIPPED | OUTPATIENT
Start: 2024-01-30

## 2024-01-30 RX ORDER — OMEPRAZOLE 20 MG/1
CAPSULE, DELAYED RELEASE ORAL
Qty: 90 CAPSULE | Refills: 1 | Status: SHIPPED | OUTPATIENT
Start: 2024-01-30

## 2024-01-30 SDOH — ECONOMIC STABILITY: FOOD INSECURITY: WITHIN THE PAST 12 MONTHS, THE FOOD YOU BOUGHT JUST DIDN'T LAST AND YOU DIDN'T HAVE MONEY TO GET MORE.: NEVER TRUE

## 2024-01-30 SDOH — ECONOMIC STABILITY: FOOD INSECURITY: WITHIN THE PAST 12 MONTHS, YOU WORRIED THAT YOUR FOOD WOULD RUN OUT BEFORE YOU GOT MONEY TO BUY MORE.: NEVER TRUE

## 2024-01-30 SDOH — ECONOMIC STABILITY: INCOME INSECURITY: HOW HARD IS IT FOR YOU TO PAY FOR THE VERY BASICS LIKE FOOD, HOUSING, MEDICAL CARE, AND HEATING?: NOT HARD AT ALL

## 2024-01-30 ASSESSMENT — ENCOUNTER SYMPTOMS
CHEST TIGHTNESS: 0
ABDOMINAL PAIN: 0
BACK PAIN: 1

## 2024-01-30 ASSESSMENT — PATIENT HEALTH QUESTIONNAIRE - PHQ9
9. THOUGHTS THAT YOU WOULD BE BETTER OFF DEAD, OR OF HURTING YOURSELF: 0
5. POOR APPETITE OR OVEREATING: 0
SUM OF ALL RESPONSES TO PHQ QUESTIONS 1-9: 1
SUM OF ALL RESPONSES TO PHQ QUESTIONS 1-9: 1
8. MOVING OR SPEAKING SO SLOWLY THAT OTHER PEOPLE COULD HAVE NOTICED. OR THE OPPOSITE, BEING SO FIGETY OR RESTLESS THAT YOU HAVE BEEN MOVING AROUND A LOT MORE THAN USUAL: 0
7. TROUBLE CONCENTRATING ON THINGS, SUCH AS READING THE NEWSPAPER OR WATCHING TELEVISION: 0
4. FEELING TIRED OR HAVING LITTLE ENERGY: 0
SUM OF ALL RESPONSES TO PHQ9 QUESTIONS 1 & 2: 1
2. FEELING DOWN, DEPRESSED OR HOPELESS: 1
6. FEELING BAD ABOUT YOURSELF - OR THAT YOU ARE A FAILURE OR HAVE LET YOURSELF OR YOUR FAMILY DOWN: 0
10. IF YOU CHECKED OFF ANY PROBLEMS, HOW DIFFICULT HAVE THESE PROBLEMS MADE IT FOR YOU TO DO YOUR WORK, TAKE CARE OF THINGS AT HOME, OR GET ALONG WITH OTHER PEOPLE: 0
3. TROUBLE FALLING OR STAYING ASLEEP: 0
SUM OF ALL RESPONSES TO PHQ QUESTIONS 1-9: 1
SUM OF ALL RESPONSES TO PHQ QUESTIONS 1-9: 1
1. LITTLE INTEREST OR PLEASURE IN DOING THINGS: 0

## 2024-01-30 NOTE — PROGRESS NOTES
1. \"Have you been to the ER, urgent care clinic since your last visit?  Hospitalized since your last visit?\" no    2. \"Have you seen or consulted any other health care providers outside of the [facility] since your last visit?\" no         Health Maintenance Due   Topic Date Due    COVID-19 Vaccine (1) Never done    HIV screen  Never done    DTaP/Tdap/Td vaccine (1 - Tdap) Never done    Breast cancer screen  Never done    Shingles vaccine (1 of 2) Never done    Respiratory Syncytial Virus (RSV) Pregnant or age 60 yrs+ (1 - 1-dose 60+ series) Never done    Colorectal Cancer Screen  07/22/2023    Flu vaccine (1) 08/01/2023      
reflux disease without esophagitis  K21.9 omeprazole (PRILOSEC) 20 MG delayed release capsule      4. Recurrent major depressive disorder, in full remission (HCC)  F33.42       5. Vitamin D deficiency  E55.9 Vitamin D 25 Hydroxy      6. Chronic fatigue  R53.82 Vitamin B12      7. Acute right-sided low back pain without sciatica  M54.50 cyclobenzaprine (FLEXERIL) 5 MG tablet          1. Essential hypertension  - spironolactone (ALDACTONE) 25 MG tablet; Take 1 tablet by mouth daily  Dispense: 90 tablet; Refill: 1  - Comprehensive Metabolic Panel; Future  - CBC; Future    2. Mixed hyperlipidemia  - Lipid Panel; Future    3. Gastroesophageal reflux disease without esophagitis  - omeprazole (PRILOSEC) 20 MG delayed release capsule; TAKE 1 CAPSULE BY MOUTH ONCE DAILY (PATIENT  NEEDS  FOLLOW  UP)  Dispense: 90 capsule; Refill: 1    4. Recurrent major depressive disorder, in full remission (HCC)    5. Vitamin D deficiency  - Vitamin D 25 Hydroxy; Future    6. Chronic fatigue  - Vitamin B12; Future    7. Acute right-sided low back pain without sciatica  - cyclobenzaprine (FLEXERIL) 5 MG tablet; Take 1 tablet by mouth 3 times daily as needed for Muscle spasms  Dispense: 30 tablet; Refill: 0       Follow-up and Dispositions    Return in about 3 months (around 4/30/2024).           I have discussed the diagnosis with the patient and the intended plan as seen in the above orders.  Social history, medical history, and labs were reviewed.  The patient has received an after-visit summary and questions were answered concerning future plans.  I have discussed medication side effects and warnings with the patient as well.    Ricco Mace MD  [facility]  01/30/24

## 2024-02-01 ENCOUNTER — TELEPHONE (OUTPATIENT)
Facility: CLINIC | Age: 59
End: 2024-02-01

## 2024-02-01 DIAGNOSIS — E78.2 MIXED HYPERLIPIDEMIA: ICD-10-CM

## 2024-02-01 DIAGNOSIS — I10 ESSENTIAL HYPERTENSION: Primary | ICD-10-CM

## 2024-02-01 DIAGNOSIS — E55.9 VITAMIN D DEFICIENCY: ICD-10-CM

## 2024-02-01 DIAGNOSIS — E53.8 B12 DEFICIENCY: ICD-10-CM

## 2024-02-01 NOTE — TELEPHONE ENCOUNTER
Pt states she saw  on 1-30,, and needed to do labs. She says the  could not draw her blood, and she was told to come back for labs. I do not see any pending labs for this pt. Please advise.

## 2024-02-02 DIAGNOSIS — I10 ESSENTIAL HYPERTENSION: ICD-10-CM

## 2024-02-02 DIAGNOSIS — E53.8 B12 DEFICIENCY: ICD-10-CM

## 2024-02-02 DIAGNOSIS — E78.2 MIXED HYPERLIPIDEMIA: ICD-10-CM

## 2024-02-02 DIAGNOSIS — E55.9 VITAMIN D DEFICIENCY: ICD-10-CM

## 2024-02-03 LAB
25(OH)D3 SERPL-MCNC: 125.3 NG/ML (ref 30–100)
ALBUMIN SERPL-MCNC: 4.2 G/DL (ref 3.5–5)
ALBUMIN/GLOB SERPL: 1.1 (ref 1.1–2.2)
ALP SERPL-CCNC: 92 U/L (ref 45–117)
ALT SERPL-CCNC: 15 U/L (ref 12–78)
ANION GAP SERPL CALC-SCNC: 7 MMOL/L (ref 5–15)
AST SERPL-CCNC: 14 U/L (ref 15–37)
BILIRUB SERPL-MCNC: 0.6 MG/DL (ref 0.2–1)
BUN SERPL-MCNC: 15 MG/DL (ref 6–20)
BUN/CREAT SERPL: 14 (ref 12–20)
CALCIUM SERPL-MCNC: 9.8 MG/DL (ref 8.5–10.1)
CHLORIDE SERPL-SCNC: 103 MMOL/L (ref 97–108)
CHOLEST SERPL-MCNC: 169 MG/DL
CO2 SERPL-SCNC: 25 MMOL/L (ref 21–32)
CREAT SERPL-MCNC: 1.05 MG/DL (ref 0.55–1.02)
ERYTHROCYTE [DISTWIDTH] IN BLOOD BY AUTOMATED COUNT: 14.1 % (ref 11.5–14.5)
GLOBULIN SER CALC-MCNC: 4 G/DL (ref 2–4)
GLUCOSE SERPL-MCNC: 110 MG/DL (ref 65–100)
HCT VFR BLD AUTO: 33.6 % (ref 35–47)
HDLC SERPL-MCNC: 66 MG/DL
HDLC SERPL: 2.6 (ref 0–5)
HGB BLD-MCNC: 10.5 G/DL (ref 11.5–16)
LDLC SERPL CALC-MCNC: 83.8 MG/DL (ref 0–100)
MCH RBC QN AUTO: 30.2 PG (ref 26–34)
MCHC RBC AUTO-ENTMCNC: 31.3 G/DL (ref 30–36.5)
MCV RBC AUTO: 96.6 FL (ref 80–99)
NRBC # BLD: 0 K/UL (ref 0–0.01)
NRBC BLD-RTO: 0 PER 100 WBC
PLATELET # BLD AUTO: 171 K/UL (ref 150–400)
POTASSIUM SERPL-SCNC: 4.7 MMOL/L (ref 3.5–5.1)
PROT SERPL-MCNC: 8.2 G/DL (ref 6.4–8.2)
RBC # BLD AUTO: 3.48 M/UL (ref 3.8–5.2)
SODIUM SERPL-SCNC: 135 MMOL/L (ref 136–145)
TRIGL SERPL-MCNC: 96 MG/DL
VIT B12 SERPL-MCNC: 618 PG/ML (ref 193–986)
VLDLC SERPL CALC-MCNC: 19.2 MG/DL
WBC # BLD AUTO: 8.2 K/UL (ref 3.6–11)

## 2024-02-09 ENCOUNTER — OFFICE VISIT (OUTPATIENT)
Facility: CLINIC | Age: 59
End: 2024-02-09
Payer: MEDICAID

## 2024-02-09 VITALS
HEIGHT: 57 IN | BODY MASS INDEX: 30.42 KG/M2 | HEART RATE: 67 BPM | WEIGHT: 141 LBS | RESPIRATION RATE: 18 BRPM | OXYGEN SATURATION: 97 % | SYSTOLIC BLOOD PRESSURE: 105 MMHG | DIASTOLIC BLOOD PRESSURE: 65 MMHG | TEMPERATURE: 98.5 F

## 2024-02-09 DIAGNOSIS — K04.7 DENTAL INFECTION: Primary | ICD-10-CM

## 2024-02-09 DIAGNOSIS — K02.9 INFECTED DENTAL CARIES: ICD-10-CM

## 2024-02-09 DIAGNOSIS — K04.7 INFECTED DENTAL CARIES: ICD-10-CM

## 2024-02-09 PROCEDURE — 99213 OFFICE O/P EST LOW 20 MIN: CPT | Performed by: STUDENT IN AN ORGANIZED HEALTH CARE EDUCATION/TRAINING PROGRAM

## 2024-02-09 PROCEDURE — 3074F SYST BP LT 130 MM HG: CPT | Performed by: STUDENT IN AN ORGANIZED HEALTH CARE EDUCATION/TRAINING PROGRAM

## 2024-02-09 PROCEDURE — 3078F DIAST BP <80 MM HG: CPT | Performed by: STUDENT IN AN ORGANIZED HEALTH CARE EDUCATION/TRAINING PROGRAM

## 2024-02-09 RX ORDER — AMOXICILLIN AND CLAVULANATE POTASSIUM 875; 125 MG/1; MG/1
1 TABLET, FILM COATED ORAL 2 TIMES DAILY
Qty: 14 TABLET | Refills: 0 | Status: SHIPPED | OUTPATIENT
Start: 2024-02-09 | End: 2024-02-16

## 2024-02-09 NOTE — PROGRESS NOTES
Chief Complaint   Patient presents with    Dental Pain     X4 days duration. Reports right cheek swelling. Right upper tooth.       History of Present Illness:  Mary Gonzalez is a 58 y.o. female w/ PMHx of HTN, HLD, GERD, depression who presents to clinic for evaluation of dental pain.  - Notes right upper dental pain associated with swelling.  - Symptoms started four days ago.  - Has had pain with chewing on right side.  - Denies fevers.  - No difficulty swallowing.  - Does currently see a dentist.  - Unable to find a local dentist due to cost.  - Previously referred to [facility].      HTN:  - Pt prescribed HCTZ 25 mg daily, lisinopril 40 mg daily, metoprolol 50 mg BID, spironolactone 25 mg daily.    HLD:  - Pt prescribed lovastatin 20 mg daily.    GERD:  - Pt prescribed omperazole 20 mg.    Depression:  - Pt prescribed Celexa 20 mg daily.          Past Medical History:   Diagnosis Date    Depression 8/30/2010    GERD (gastroesophageal reflux disease) 12/30/2011    Hyperlipidemia 12/30/2011    Hypertension        Current Outpatient Medications   Medication Sig Dispense Refill    amoxicillin-clavulanate (AUGMENTIN) 875-125 MG per tablet Take 1 tablet by mouth 2 times daily for 7 days 14 tablet 0    spironolactone (ALDACTONE) 25 MG tablet Take 1 tablet by mouth daily 90 tablet 1    omeprazole (PRILOSEC) 20 MG delayed release capsule TAKE 1 CAPSULE BY MOUTH ONCE DAILY (PATIENT  NEEDS  FOLLOW  UP) 90 capsule 1    cyclobenzaprine (FLEXERIL) 5 MG tablet Take 1 tablet by mouth 3 times daily as needed for Muscle spasms 30 tablet 0    lisinopril (PRINIVIL;ZESTRIL) 40 MG tablet Take 1 tablet by mouth once daily 30 tablet 0    hydroCHLOROthiazide (HYDRODIURIL) 25 MG tablet Take 1 tablet by mouth once daily 30 tablet 0    vitamin D (ERGOCALCIFEROL) 1.25 MG (47386 UT) CAPS capsule TAKE 1 CAPSULE BY MOUTH ONCE A WEEK AT 5PM (PATIENT NEEDS APPOINTMENT) 4 capsule 0    citalopram (CELEXA) 20 MG tablet Take 1 tablet by

## 2024-02-09 NOTE — PATIENT INSTRUCTIONS
[facility]  [facility]  [address] 4th [address]  [id number]  [place] [address]  General patient information and appointments [phone]

## 2024-02-13 DIAGNOSIS — F33.9 RECURRENT MAJOR DEPRESSIVE DISORDER, REMISSION STATUS UNSPECIFIED (HCC): Primary | ICD-10-CM

## 2024-02-14 RX ORDER — CITALOPRAM 20 MG/1
20 TABLET ORAL DAILY
Qty: 90 TABLET | Refills: 1 | Status: SHIPPED | OUTPATIENT
Start: 2024-02-14

## 2024-02-18 DIAGNOSIS — I10 ESSENTIAL HYPERTENSION: Primary | ICD-10-CM

## 2024-02-19 RX ORDER — LISINOPRIL 40 MG/1
TABLET ORAL
Qty: 90 TABLET | Refills: 1 | Status: SHIPPED | OUTPATIENT
Start: 2024-02-19

## 2024-02-19 RX ORDER — HYDROCHLOROTHIAZIDE 25 MG/1
TABLET ORAL
Qty: 90 TABLET | Refills: 1 | Status: SHIPPED | OUTPATIENT
Start: 2024-02-19

## 2024-03-07 RX ORDER — ERGOCALCIFEROL 1.25 MG/1
CAPSULE ORAL
Qty: 4 CAPSULE | Refills: 1 | Status: SHIPPED | OUTPATIENT
Start: 2024-03-07

## 2024-03-07 RX ORDER — HYDROGEN PEROXIDE 2.65 ML/100ML
LIQUID ORAL; TOPICAL
Qty: 90 TABLET | Refills: 1 | Status: SHIPPED | OUTPATIENT
Start: 2024-03-07

## 2024-05-17 ENCOUNTER — OFFICE VISIT (OUTPATIENT)
Facility: CLINIC | Age: 59
End: 2024-05-17
Payer: MEDICAID

## 2024-05-17 VITALS
SYSTOLIC BLOOD PRESSURE: 129 MMHG | DIASTOLIC BLOOD PRESSURE: 80 MMHG | RESPIRATION RATE: 14 BRPM | WEIGHT: 136.8 LBS | OXYGEN SATURATION: 100 % | HEART RATE: 81 BPM | HEIGHT: 57 IN | BODY MASS INDEX: 29.51 KG/M2 | TEMPERATURE: 97.8 F

## 2024-05-17 DIAGNOSIS — E78.2 MIXED HYPERLIPIDEMIA: ICD-10-CM

## 2024-05-17 DIAGNOSIS — I10 ESSENTIAL HYPERTENSION: Primary | ICD-10-CM

## 2024-05-17 DIAGNOSIS — M54.42 ACUTE BILATERAL LOW BACK PAIN WITH LEFT-SIDED SCIATICA: ICD-10-CM

## 2024-05-17 DIAGNOSIS — E53.8 B12 DEFICIENCY: ICD-10-CM

## 2024-05-17 DIAGNOSIS — K21.9 GASTROESOPHAGEAL REFLUX DISEASE WITHOUT ESOPHAGITIS: ICD-10-CM

## 2024-05-17 DIAGNOSIS — R06.09 DYSPNEA ON EXERTION: ICD-10-CM

## 2024-05-17 DIAGNOSIS — F32.A DEPRESSION, UNSPECIFIED DEPRESSION TYPE: ICD-10-CM

## 2024-05-17 DIAGNOSIS — E55.9 VITAMIN D DEFICIENCY: ICD-10-CM

## 2024-05-17 PROCEDURE — 99214 OFFICE O/P EST MOD 30 MIN: CPT | Performed by: FAMILY MEDICINE

## 2024-05-17 PROCEDURE — 3074F SYST BP LT 130 MM HG: CPT | Performed by: FAMILY MEDICINE

## 2024-05-17 PROCEDURE — 3079F DIAST BP 80-89 MM HG: CPT | Performed by: FAMILY MEDICINE

## 2024-05-17 RX ORDER — DICLOFENAC SODIUM 75 MG/1
TABLET, DELAYED RELEASE ORAL
COMMUNITY
Start: 2024-05-06

## 2024-05-17 RX ORDER — ASPIRIN 81 MG/1
81 TABLET ORAL DAILY
Qty: 90 TABLET | Refills: 1 | Status: SHIPPED | OUTPATIENT
Start: 2024-05-17

## 2024-05-17 RX ORDER — ERGOCALCIFEROL 1.25 MG/1
CAPSULE ORAL
Qty: 13 CAPSULE | Refills: 1 | Status: SHIPPED | OUTPATIENT
Start: 2024-05-17

## 2024-05-17 ASSESSMENT — ENCOUNTER SYMPTOMS
BACK PAIN: 1
ABDOMINAL PAIN: 0
APNEA: 0
CHEST TIGHTNESS: 0

## 2024-05-17 NOTE — PROGRESS NOTES
[facility]    History of Present Illness:   Mary Gonzalez is a 58 y.o. female with history of f HTN, GERD, Depression and Anxiety, HLD, Vitamin D    CC: Follow up  History provided by patient and Records    HPI:  Low back pain: Patient notes over the last 2 weeks having issues  with low back pains and sensation of \"Weakness\" in the legs bilaterally and about equally.  Notes occurs after walking a block, and requires her to sit down.  Denies chest pressure, but does note some epigastric irritations.  Patient notes occasional shooting pain into the left leg as well, but primarily when sitting.  Low back pain is about 5/10 intensity that is there constantly, does not change based on activity or position. Does have history of a PFO.  Dneies leg swelling at this time.       Anxiety/Depression: Controlled on Celexa, is taking trazodone as needed.     Hypertension Follow up:  The patient reports:  taking medications as instructed, no medication side effects noted, no TIA's, no chest pain on exertion, no dyspnea on exertion, no swelling of ankles, no orthostatic dizziness or lightheadedness, no orthopnea or paroxysmal nocturnal dyspnea.     BP Readings from Last 3 Encounters:   05/17/24 129/80   02/09/24 105/65   01/30/24 128/79      Gastroesophageal Reflux:  Current control of Symptoms: Controlled  Primary symptoms: heartburn  Hiatal Hernia: No  Current Medications: Prilosec  The patient has no history melena or bright red blood in the stools.  The patient avoids high dose aspirin and NSAID therapy.  The patient is aware of diet changes needed, elevating the head of the bed and appropriate use of antacids.      Hypertriglyceridemia Follow up:   Cardiovascular risks for her are: hypertension  hyperlipidemia.   Current Medications:  lovastatin - 20 MG    Compliance: Yes   Myalgias: No   Fatigue: No   Other side effects: No     Wt Readings from Last 3 Encounters:   05/17/24 62.1 kg (136 lb 12.8 oz)

## 2024-05-17 NOTE — PROGRESS NOTES
Chief Complaint   Patient presents with    Follow-up Chronic Condition     Back pain radiating to both sides, bilateral leg weakness          \"Have you been to the ER, urgent care clinic since your last visit?  Hospitalized since your last visit?\"    NO    “Have you seen or consulted any other health care providers outside of [facility] since your last visit?”    NO    “Have you had a colorectal cancer screening such as a colonoscopy/FIT/Cologuard?    NO    No colonoscopy on file  No cologuard on file  Date of last FIT: 7/22/2022   No flexible sigmoidoscopy on file            Health Maintenance Due   Topic Date Due    Diabetes screen  Never done    Colorectal Cancer Screen  07/22/2023

## 2024-05-18 LAB
25(OH)D3 SERPL-MCNC: 106.9 NG/ML (ref 30–100)
ALBUMIN SERPL-MCNC: 4 G/DL (ref 3.5–5)
ALBUMIN/GLOB SERPL: 1 (ref 1.1–2.2)
ALP SERPL-CCNC: 88 U/L (ref 45–117)
ALT SERPL-CCNC: 15 U/L (ref 12–78)
ANION GAP SERPL CALC-SCNC: 8 MMOL/L (ref 5–15)
AST SERPL-CCNC: 14 U/L (ref 15–37)
BILIRUB SERPL-MCNC: 0.7 MG/DL (ref 0.2–1)
BUN SERPL-MCNC: 21 MG/DL (ref 6–20)
BUN/CREAT SERPL: 19 (ref 12–20)
CALCIUM SERPL-MCNC: 10.1 MG/DL (ref 8.5–10.1)
CHLORIDE SERPL-SCNC: 99 MMOL/L (ref 97–108)
CHOLEST SERPL-MCNC: 177 MG/DL
CO2 SERPL-SCNC: 23 MMOL/L (ref 21–32)
CREAT SERPL-MCNC: 1.11 MG/DL (ref 0.55–1.02)
ERYTHROCYTE [DISTWIDTH] IN BLOOD BY AUTOMATED COUNT: 14.6 % (ref 11.5–14.5)
GLOBULIN SER CALC-MCNC: 4 G/DL (ref 2–4)
GLUCOSE SERPL-MCNC: 94 MG/DL (ref 65–100)
HCT VFR BLD AUTO: 32.9 % (ref 35–47)
HDLC SERPL-MCNC: 79 MG/DL
HDLC SERPL: 2.2 (ref 0–5)
HGB BLD-MCNC: 10.1 G/DL (ref 11.5–16)
LDLC SERPL CALC-MCNC: 77 MG/DL (ref 0–100)
MCH RBC QN AUTO: 29.7 PG (ref 26–34)
MCHC RBC AUTO-ENTMCNC: 30.7 G/DL (ref 30–36.5)
MCV RBC AUTO: 96.8 FL (ref 80–99)
NRBC # BLD: 0 K/UL (ref 0–0.01)
NRBC BLD-RTO: 0 PER 100 WBC
PLATELET # BLD AUTO: 237 K/UL (ref 150–400)
PMV BLD AUTO: 12.5 FL (ref 8.9–12.9)
POTASSIUM SERPL-SCNC: 5.2 MMOL/L (ref 3.5–5.1)
PROT SERPL-MCNC: 8 G/DL (ref 6.4–8.2)
RBC # BLD AUTO: 3.4 M/UL (ref 3.8–5.2)
SODIUM SERPL-SCNC: 130 MMOL/L (ref 136–145)
TRIGL SERPL-MCNC: 105 MG/DL
VIT B12 SERPL-MCNC: 448 PG/ML (ref 193–986)
VLDLC SERPL CALC-MCNC: 21 MG/DL
WBC # BLD AUTO: 9.5 K/UL (ref 3.6–11)

## 2024-05-20 ENCOUNTER — TELEPHONE (OUTPATIENT)
Facility: CLINIC | Age: 59
End: 2024-05-20

## 2024-05-20 DIAGNOSIS — E87.1 HYPONATREMIA: Primary | ICD-10-CM

## 2024-05-20 NOTE — TELEPHONE ENCOUNTER
Called and left VM.    Called ot discuss labs.  \"Your Sodium is low and your Potassium is elevated.  I have a feeling this may be related to the use of HCTZ and Spironolactone as they are diuretics.  I want you to stop your Spironolactone now and I am ordering a repeat Metabolic panel for later this week.  This could be the cause of you leg weakness as your other labs were good.\"    Ricco Mace MD  [facility]  05/20/24

## 2024-05-24 ENCOUNTER — TELEPHONE (OUTPATIENT)
Age: 59
End: 2024-05-24

## 2024-07-19 ENCOUNTER — OFFICE VISIT (OUTPATIENT)
Age: 59
End: 2024-07-19
Payer: MEDICAID

## 2024-07-19 VITALS
WEIGHT: 140 LBS | BODY MASS INDEX: 30.2 KG/M2 | OXYGEN SATURATION: 98 % | HEIGHT: 57 IN | HEART RATE: 67 BPM | DIASTOLIC BLOOD PRESSURE: 80 MMHG | SYSTOLIC BLOOD PRESSURE: 120 MMHG

## 2024-07-19 DIAGNOSIS — R06.02 SHORTNESS OF BREATH: ICD-10-CM

## 2024-07-19 DIAGNOSIS — E78.2 MIXED HYPERLIPIDEMIA: ICD-10-CM

## 2024-07-19 DIAGNOSIS — I10 ESSENTIAL HYPERTENSION: Primary | ICD-10-CM

## 2024-07-19 PROCEDURE — 3079F DIAST BP 80-89 MM HG: CPT | Performed by: SPECIALIST

## 2024-07-19 PROCEDURE — 3074F SYST BP LT 130 MM HG: CPT | Performed by: SPECIALIST

## 2024-07-19 PROCEDURE — 93005 ELECTROCARDIOGRAM TRACING: CPT | Performed by: SPECIALIST

## 2024-07-19 PROCEDURE — 99204 OFFICE O/P NEW MOD 45 MIN: CPT | Performed by: SPECIALIST

## 2024-07-19 PROCEDURE — 93010 ELECTROCARDIOGRAM REPORT: CPT | Performed by: SPECIALIST

## 2024-07-19 NOTE — PROGRESS NOTES
Rox Carcamo MD. [facility]          Patient: Mary Gonzalez  : 1965      Today's Date: 2024        HISTORY OF PRESENT ILLNESS:     History of Present Illness:  Referred for SINGH   Has had SINGH for years. Class 2-3.    No orthopnea.  No CP.    She says she had open heart surgery when 8 yo.       PAST MEDICAL HISTORY:     Past Medical History:   Diagnosis Date    Depression 2010    GERD (gastroesophageal reflux disease) 2011    Hyperlipidemia 2011    Hypertension        Past Surgical History:   Procedure Laterality Date    HERNIA REPAIR      [place] UNLISTED PROCEDURE CARDIAC SURGERY      closed hole in heart    TUBAL LIGATION               CURRENT MEDICATIONS:    .  Current Outpatient Medications   Medication Sig Dispense Refill    diclofenac (VOLTAREN) 75 MG EC tablet       aspirin (EQ ASPIRIN ADULT LOW DOSE) 81 MG EC tablet Take 1 tablet by mouth daily 90 tablet 1    vitamin D (ERGOCALCIFEROL) 1.25 MG (60183 UT) CAPS capsule TAKE 1 CAPSULE BY MOUTH ONCE A WEEK AT 5PM (NEED APPOINTMENT FOR FUTURE REFILLS) 13 capsule 1    hydroCHLOROthiazide (HYDRODIURIL) 25 MG tablet Take 1 tablet by mouth once daily 90 tablet 1    lisinopril (PRINIVIL;ZESTRIL) 40 MG tablet Take 1 tablet by mouth once daily 90 tablet 1    citalopram (CELEXA) 20 MG tablet Take 1 tablet by mouth daily 90 tablet 1    omeprazole (PRILOSEC) 20 MG delayed release capsule TAKE 1 CAPSULE BY MOUTH ONCE DAILY (PATIENT  NEEDS  FOLLOW  UP) 90 capsule 1    lovastatin (MEVACOR) 20 MG tablet Take 1 tablet by mouth daily 90 tablet 1    metoprolol tartrate (LOPRESSOR) 50 MG tablet Take 1 tablet by mouth twice daily 180 tablet 1    naproxen (NAPROSYN) 500 MG tablet TAKE 1 TABLET BY MOUTH TWICE DAILY WITH MEALS 60 tablet 1    traZODone (DESYREL) 50 MG tablet Take 1 tablet by mouth nightly as needed       No current facility-administered medications for this visit.       No Known Allergies      SOCIAL HISTORY:     Social History

## 2024-07-19 NOTE — PROGRESS NOTES
Chief Complaint   Patient presents with    Shortness of Breath     Vitals:    07/19/24 1113   BP: 120/80   Site: Left Upper Arm   Position: Sitting   Pulse: 67   SpO2: 98%   Weight: 63.5 kg (140 lb)   Height: 1.448 m (4' 9\")     Chest pain: DENIED     Recent hospital stays: DENIED     Refills: DENIED

## 2024-07-24 RX ORDER — DICLOFENAC SODIUM 75 MG/1
75 TABLET, DELAYED RELEASE ORAL 2 TIMES DAILY
Qty: 60 TABLET | Refills: 2 | Status: SHIPPED | OUTPATIENT
Start: 2024-07-24

## 2024-08-19 ENCOUNTER — OFFICE VISIT (OUTPATIENT)
Facility: CLINIC | Age: 59
End: 2024-08-19
Payer: MEDICAID

## 2024-08-19 VITALS
DIASTOLIC BLOOD PRESSURE: 63 MMHG | BODY MASS INDEX: 28.82 KG/M2 | HEIGHT: 57 IN | TEMPERATURE: 97.4 F | RESPIRATION RATE: 18 BRPM | HEART RATE: 67 BPM | OXYGEN SATURATION: 100 % | WEIGHT: 133.6 LBS | SYSTOLIC BLOOD PRESSURE: 133 MMHG

## 2024-08-19 DIAGNOSIS — I10 ESSENTIAL HYPERTENSION: ICD-10-CM

## 2024-08-19 DIAGNOSIS — K21.9 GASTROESOPHAGEAL REFLUX DISEASE WITHOUT ESOPHAGITIS: ICD-10-CM

## 2024-08-19 DIAGNOSIS — E55.9 VITAMIN D DEFICIENCY: ICD-10-CM

## 2024-08-19 DIAGNOSIS — E53.8 B12 DEFICIENCY: ICD-10-CM

## 2024-08-19 DIAGNOSIS — R05.1 ACUTE COUGH: Primary | ICD-10-CM

## 2024-08-19 DIAGNOSIS — E78.2 MIXED HYPERLIPIDEMIA: ICD-10-CM

## 2024-08-19 PROCEDURE — 3075F SYST BP GE 130 - 139MM HG: CPT | Performed by: FAMILY MEDICINE

## 2024-08-19 PROCEDURE — 3078F DIAST BP <80 MM HG: CPT | Performed by: FAMILY MEDICINE

## 2024-08-19 PROCEDURE — 99214 OFFICE O/P EST MOD 30 MIN: CPT | Performed by: FAMILY MEDICINE

## 2024-08-19 RX ORDER — LISINOPRIL 40 MG/1
TABLET ORAL
Qty: 120 TABLET | Refills: 1 | Status: SHIPPED | OUTPATIENT
Start: 2024-08-19

## 2024-08-19 RX ORDER — BENZONATATE 200 MG/1
200 CAPSULE ORAL 3 TIMES DAILY PRN
Qty: 30 CAPSULE | Refills: 1 | Status: SHIPPED | OUTPATIENT
Start: 2024-08-19

## 2024-08-19 RX ORDER — OMEPRAZOLE 20 MG/1
20 CAPSULE, DELAYED RELEASE ORAL 2 TIMES DAILY
Qty: 180 CAPSULE | Refills: 1 | Status: SHIPPED | OUTPATIENT
Start: 2024-08-19

## 2024-08-19 ASSESSMENT — ENCOUNTER SYMPTOMS
COUGH: 1
SINUS PRESSURE: 0
SINUS PAIN: 0

## 2024-08-19 NOTE — PROGRESS NOTES
\"Have you been to the ER, urgent care clinic since your last visit?  Hospitalized since your last visit?\"    NO    “Have you seen or consulted any other health care providers outside of [facility] since your last visit?”    NO        “Have you had a colorectal cancer screening such as a colonoscopy/FIT/Cologuard?    NO    No colonoscopy on file  No cologuard on file  Date of last FIT: 7/22/2022   No flexible sigmoidoscopy on file         Click Here for Release of Records Request

## 2024-08-19 NOTE — PROGRESS NOTES
[facility]    History of Present Illness:   Mary Gonzalez is a 59 y.o. female with history of HTN, GERD, Depression and Anxiety, HLD, Vitamin D    CC: Follow up  History provided by patient and Records    HPI:  Cough: Noting coughing and wheezing going on for a few weeks now, but noted also that her GERD has been poorly controlled until recently.  Tends to get worse at night as well.  Trying robitussin.  Denies congestion, more throat irritation.    Hypertension Follow up:  The patient reports:  taking medications as instructed, no medication side effects noted, no TIA's, no chest pain on exertion, no dyspnea on exertion, no swelling of ankles, no orthostatic dizziness or lightheadedness, no orthopnea or paroxysmal nocturnal dyspnea.     BP Readings from Last 3 Encounters:   08/19/24 133/63   07/19/24 120/80   05/17/24 129/80      Gastroesophageal Reflux:  Current control of Symptoms: recently uncontrolled but improved now.  Primary symptoms: heartburn  Hiatal Hernia: No  Current Medications: Prilosec  The patient has no history melena or bright red blood in the stools.  The patient avoids high dose aspirin and NSAID therapy.  The patient is aware of diet changes needed, elevating the head of the bed and appropriate use of antacids.      Hypertriglyceridemia Follow up:   Cardiovascular risks for her are: hypertension  hyperlipidemia.   Current Medications:  lovastatin - 20 MG               Compliance: Yes              Myalgias: No              Fatigue: No              Other side effects: No     Wt Readings from Last 3 Encounters:   08/19/24 60.6 kg (133 lb 9.6 oz)   07/19/24 63.5 kg (140 lb)   05/17/24 62.1 kg (136 lb 12.8 oz)       Lab Results   Component Value Date/Time    CHOL 177 05/17/2024 03:55 PM    HDL 79 05/17/2024 03:55 PM    LDL 77 05/17/2024 03:55 PM    LDL 83.8 02/02/2024 11:20 AM    VLDL 21 05/17/2024 03:55 PM    VLDL 23 09/22/2022 12:00 AM      Lab Results   Component Value

## 2024-08-20 LAB
25(OH)D3 SERPL-MCNC: 127.4 NG/ML (ref 30–100)
ALBUMIN SERPL-MCNC: 4.3 G/DL (ref 3.5–5)
ALBUMIN/GLOB SERPL: 1.2 (ref 1.1–2.2)
ALP SERPL-CCNC: 94 U/L (ref 45–117)
ALT SERPL-CCNC: 26 U/L (ref 12–78)
ANION GAP SERPL CALC-SCNC: 10 MMOL/L (ref 5–15)
AST SERPL-CCNC: 28 U/L (ref 15–37)
BILIRUB SERPL-MCNC: 0.9 MG/DL (ref 0.2–1)
BUN SERPL-MCNC: 5 MG/DL (ref 6–20)
BUN/CREAT SERPL: 6 (ref 12–20)
CALCIUM SERPL-MCNC: 10.1 MG/DL (ref 8.5–10.1)
CHLORIDE SERPL-SCNC: 99 MMOL/L (ref 97–108)
CHOLEST SERPL-MCNC: 180 MG/DL
CO2 SERPL-SCNC: 27 MMOL/L (ref 21–32)
CREAT SERPL-MCNC: 0.87 MG/DL (ref 0.55–1.02)
ERYTHROCYTE [DISTWIDTH] IN BLOOD BY AUTOMATED COUNT: 18.1 % (ref 11.5–14.5)
GLOBULIN SER CALC-MCNC: 3.7 G/DL (ref 2–4)
GLUCOSE SERPL-MCNC: 91 MG/DL (ref 65–100)
HCT VFR BLD AUTO: 32.2 % (ref 35–47)
HDLC SERPL-MCNC: 82 MG/DL
HDLC SERPL: 2.2 (ref 0–5)
HGB BLD-MCNC: 10.1 G/DL (ref 11.5–16)
LDLC SERPL CALC-MCNC: 69.8 MG/DL (ref 0–100)
MCH RBC QN AUTO: 31.3 PG (ref 26–34)
MCHC RBC AUTO-ENTMCNC: 31.4 G/DL (ref 30–36.5)
MCV RBC AUTO: 99.7 FL (ref 80–99)
NRBC # BLD: 0.02 K/UL (ref 0–0.01)
NRBC BLD-RTO: 0.2 PER 100 WBC
PLATELET # BLD AUTO: 197 K/UL (ref 150–400)
POTASSIUM SERPL-SCNC: 4.2 MMOL/L (ref 3.5–5.1)
PROT SERPL-MCNC: 8 G/DL (ref 6.4–8.2)
RBC # BLD AUTO: 3.23 M/UL (ref 3.8–5.2)
SODIUM SERPL-SCNC: 136 MMOL/L (ref 136–145)
TRIGL SERPL-MCNC: 141 MG/DL
VIT B12 SERPL-MCNC: 568 PG/ML (ref 193–986)
VLDLC SERPL CALC-MCNC: 28.2 MG/DL
WBC # BLD AUTO: 8.8 K/UL (ref 3.6–11)

## 2024-08-26 DIAGNOSIS — I10 ESSENTIAL HYPERTENSION: ICD-10-CM

## 2024-08-26 RX ORDER — HYDROCHLOROTHIAZIDE 25 MG/1
TABLET ORAL
Qty: 90 TABLET | Refills: 1 | Status: SHIPPED | OUTPATIENT
Start: 2024-08-26

## 2024-09-09 ENCOUNTER — ANCILLARY PROCEDURE (OUTPATIENT)
Age: 59
End: 2024-09-09
Payer: MEDICAID

## 2024-09-09 VITALS
SYSTOLIC BLOOD PRESSURE: 136 MMHG | WEIGHT: 133 LBS | DIASTOLIC BLOOD PRESSURE: 78 MMHG | HEIGHT: 57 IN | BODY MASS INDEX: 28.69 KG/M2

## 2024-09-09 DIAGNOSIS — I10 ESSENTIAL HYPERTENSION: ICD-10-CM

## 2024-09-09 DIAGNOSIS — E78.2 MIXED HYPERLIPIDEMIA: ICD-10-CM

## 2024-09-09 DIAGNOSIS — R06.02 SHORTNESS OF BREATH: ICD-10-CM

## 2024-09-09 LAB
ECHO AO ASC DIAM: 2.1 CM
ECHO AO ASCENDING AORTA INDEX: 1.39 CM/M2
ECHO AO ROOT DIAM: 2.5 CM
ECHO AO ROOT INDEX: 1.66 CM/M2
ECHO AR MAX VEL PISA: 3.3 M/S
ECHO AV MEAN GRADIENT: 3 MMHG
ECHO AV MEAN VELOCITY: 0.9 M/S
ECHO AV PEAK GRADIENT: 7 MMHG
ECHO AV PEAK VELOCITY: 1.4 M/S
ECHO AV REGURGITANT PHT: 1279.6 MILLISECOND
ECHO AV VELOCITY RATIO: 0.64
ECHO AV VTI: 26.6 CM
ECHO BSA: 1.56 M2
ECHO EST RA PRESSURE: 3 MMHG
ECHO LA DIAMETER INDEX: 2.25 CM/M2
ECHO LA DIAMETER: 3.4 CM
ECHO LA TO AORTIC ROOT RATIO: 1.36
ECHO LA VOL A-L A2C: 31 ML (ref 22–52)
ECHO LA VOL A-L A4C: 59 ML (ref 22–52)
ECHO LA VOL BP: 41 ML (ref 22–52)
ECHO LA VOL MOD A2C: 29 ML (ref 22–52)
ECHO LA VOL MOD A4C: 58 ML (ref 22–52)
ECHO LA VOL/BSA BIPLANE: 27 ML/M2 (ref 16–34)
ECHO LA VOLUME AREA LENGTH: 43 ML
ECHO LA VOLUME INDEX A-L A2C: 21 ML/M2 (ref 16–34)
ECHO LA VOLUME INDEX A-L A4C: 39 ML/M2 (ref 16–34)
ECHO LA VOLUME INDEX AREA LENGTH: 28 ML/M2 (ref 16–34)
ECHO LA VOLUME INDEX MOD A2C: 19 ML/M2 (ref 16–34)
ECHO LA VOLUME INDEX MOD A4C: 38 ML/M2 (ref 16–34)
ECHO LV E' LATERAL VELOCITY: 12 CM/S
ECHO LV E' SEPTAL VELOCITY: 11 CM/S
ECHO LV EDV A2C: 42 ML
ECHO LV EDV A4C: 42 ML
ECHO LV EDV BP: 42 ML (ref 56–104)
ECHO LV EDV INDEX A4C: 28 ML/M2
ECHO LV EDV INDEX BP: 28 ML/M2
ECHO LV EDV NDEX A2C: 28 ML/M2
ECHO LV EF PHYSICIAN: 50 %
ECHO LV EJECTION FRACTION A2C: 65 %
ECHO LV EJECTION FRACTION A4C: 47 %
ECHO LV ESV A2C: 15 ML
ECHO LV ESV A4C: 22 ML
ECHO LV ESV BP: 19 ML (ref 19–49)
ECHO LV ESV INDEX A2C: 10 ML/M2
ECHO LV ESV INDEX A4C: 15 ML/M2
ECHO LV ESV INDEX BP: 13 ML/M2
ECHO LV FRACTIONAL SHORTENING: 25 % (ref 28–44)
ECHO LV INTERNAL DIMENSION DIASTOLE INDEX: 2.91 CM/M2
ECHO LV INTERNAL DIMENSION DIASTOLIC: 4.4 CM (ref 3.9–5.3)
ECHO LV INTERNAL DIMENSION SYSTOLIC INDEX: 2.19 CM/M2
ECHO LV INTERNAL DIMENSION SYSTOLIC: 3.3 CM
ECHO LV IVSD: 0.8 CM (ref 0.6–0.9)
ECHO LV MASS 2D: 118.6 G (ref 67–162)
ECHO LV MASS INDEX 2D: 78.5 G/M2 (ref 43–95)
ECHO LV POSTERIOR WALL DIASTOLIC: 0.9 CM (ref 0.6–0.9)
ECHO LV RELATIVE WALL THICKNESS RATIO: 0.41
ECHO LVOT AV VTI INDEX: 0.71
ECHO LVOT MEAN GRADIENT: 2 MMHG
ECHO LVOT PEAK GRADIENT: 4 MMHG
ECHO LVOT PEAK VELOCITY: 0.9 M/S
ECHO LVOT VTI: 18.9 CM
ECHO MV A VELOCITY: 0.98 M/S
ECHO MV AREA PHT: 4.2 CM2
ECHO MV E DECELERATION TIME (DT): 180 MS
ECHO MV E VELOCITY: 0.87 M/S
ECHO MV E/A RATIO: 0.89
ECHO MV E/E' LATERAL: 7.25
ECHO MV E/E' RATIO (AVERAGED): 7.58
ECHO MV E/E' SEPTAL: 7.91
ECHO MV PRESSURE HALF TIME (PHT): 52.2 MS
ECHO RA AREA 4C: 15.8 CM2
ECHO RA END SYSTOLIC VOLUME APICAL 4 CHAMBER INDEX BSA: 28 ML/M2
ECHO RA VOLUME AREA LENGTH APICAL 4 CHAMBER: 45 ML
ECHO RA VOLUME: 42 ML
ECHO RIGHT VENTRICULAR SYSTOLIC PRESSURE (RVSP): 46 MMHG
ECHO RV FREE WALL PEAK S': 8 CM/S
ECHO RV INTERNAL DIMENSION: 3.1 CM
ECHO RV TAPSE: 1.8 CM (ref 1.7–?)
ECHO TV REGURGITANT MAX VELOCITY: 3.29 M/S
ECHO TV REGURGITANT PEAK GRADIENT: 43 MMHG

## 2024-09-09 PROCEDURE — 93306 TTE W/DOPPLER COMPLETE: CPT | Performed by: SPECIALIST

## 2024-09-10 ENCOUNTER — TELEPHONE (OUTPATIENT)
Age: 59
End: 2024-09-10

## 2024-09-10 DIAGNOSIS — R06.02 SHORTNESS OF BREATH: Primary | ICD-10-CM

## 2024-09-10 DIAGNOSIS — I10 ESSENTIAL HYPERTENSION: ICD-10-CM

## 2024-09-10 DIAGNOSIS — R06.09 DOE (DYSPNEA ON EXERTION): ICD-10-CM

## 2024-09-11 ENCOUNTER — COMMUNITY OUTREACH (OUTPATIENT)
Facility: CLINIC | Age: 59
End: 2024-09-11

## 2024-09-18 ENCOUNTER — ANCILLARY PROCEDURE (OUTPATIENT)
Age: 59
End: 2024-09-18
Payer: MEDICAID

## 2024-09-18 VITALS — WEIGHT: 133 LBS | HEIGHT: 57 IN | BODY MASS INDEX: 28.69 KG/M2

## 2024-09-18 DIAGNOSIS — R06.02 SHORTNESS OF BREATH: ICD-10-CM

## 2024-09-18 LAB
ECHO BSA: 1.56 M2
NUC STRESS EJECTION FRACTION: 62 %
STRESS ANGINA INDEX: 0
STRESS BASELINE DIAS BP: 82 MMHG
STRESS BASELINE HR: 76 BPM
STRESS BASELINE SYS BP: 128 MMHG
STRESS ESTIMATED WORKLOAD: 4.6 METS
STRESS EXERCISE DUR MIN: 2 MIN
STRESS EXERCISE DUR SEC: 32 SEC
STRESS O2 SAT PEAK: 100 %
STRESS O2 SAT REST: 100 %
STRESS PEAK DIAS BP: 82 MMHG
STRESS PEAK SYS BP: 162 MMHG
STRESS PERCENT HR ACHIEVED: 108 %
STRESS POST PEAK HR: 171 BPM
STRESS RATE PRESSURE PRODUCT: NORMAL BPM*MMHG
STRESS TARGET HR: 159 BPM
TID: 1.12

## 2024-09-18 PROCEDURE — 78452 HT MUSCLE IMAGE SPECT MULT: CPT | Performed by: SPECIALIST

## 2024-09-18 PROCEDURE — A9500 TC99M SESTAMIBI: HCPCS | Performed by: SPECIALIST

## 2024-09-18 RX ORDER — TETRAKIS(2-METHOXYISOBUTYLISOCYANIDE)COPPER(I) TETRAFLUOROBORATE 1 MG/ML
24.8 INJECTION, POWDER, LYOPHILIZED, FOR SOLUTION INTRAVENOUS
Status: COMPLETED | OUTPATIENT
Start: 2024-09-18 | End: 2024-09-18

## 2024-09-18 RX ORDER — TETRAKIS(2-METHOXYISOBUTYLISOCYANIDE)COPPER(I) TETRAFLUOROBORATE 1 MG/ML
8.3 INJECTION, POWDER, LYOPHILIZED, FOR SOLUTION INTRAVENOUS
Status: COMPLETED | OUTPATIENT
Start: 2024-09-18 | End: 2024-09-18

## 2024-09-18 RX ADMIN — TECHNETIUM TC 99M SESTAMIBI 24.8 MILLICURIE: 1 INJECTION, POWDER, FOR SOLUTION INTRAVENOUS at 13:57

## 2024-09-18 RX ADMIN — TECHNETIUM TC 99M SESTAMIBI 8.3 MILLICURIE: 1 INJECTION, POWDER, FOR SOLUTION INTRAVENOUS at 12:40

## 2024-09-24 DIAGNOSIS — I10 ESSENTIAL HYPERTENSION: Primary | ICD-10-CM

## 2024-09-24 RX ORDER — METOPROLOL TARTRATE 50 MG
50 TABLET ORAL 2 TIMES DAILY
Qty: 180 TABLET | Refills: 1 | Status: SHIPPED | OUTPATIENT
Start: 2024-09-24

## 2024-09-30 ENCOUNTER — HOSPITAL ENCOUNTER (OUTPATIENT)
Facility: HOSPITAL | Age: 59
Discharge: HOME | End: 2024-10-03
Attending: SPECIALIST

## 2024-09-30 DIAGNOSIS — R06.09 DOE (DYSPNEA ON EXERTION): ICD-10-CM

## 2024-09-30 DIAGNOSIS — I10 ESSENTIAL HYPERTENSION: ICD-10-CM

## 2024-09-30 DIAGNOSIS — R06.02 SHORTNESS OF BREATH: ICD-10-CM

## 2024-09-30 PROCEDURE — 75571 CT HRT W/O DYE W/CA TEST: CPT

## 2024-10-08 ENCOUNTER — TELEPHONE (OUTPATIENT)
Age: 59
End: 2024-10-08

## 2024-10-08 DIAGNOSIS — R94.31 ABNORMAL EKG: ICD-10-CM

## 2024-10-08 DIAGNOSIS — R06.02 SHORTNESS OF BREATH: Primary | ICD-10-CM

## 2024-10-08 DIAGNOSIS — Q24.9 CONGENITAL HEART DISEASE: ICD-10-CM

## 2024-10-08 NOTE — TELEPHONE ENCOUNTER
Called pt,  LVM, sent mychart msg  Per Dr. Rox Carcamo: \"Can you please let her know CT heart scan looks great.  No sig CAD.    Let's do a cardiac MRI next to look at heart anatomy better giver her congenital heart disease, SOB, pulm HTN and abnormal EKG.     We may consider a RHC later if cMRI is normal.   Thanks.\"

## 2024-10-31 ENCOUNTER — TELEPHONE (OUTPATIENT)
Facility: CLINIC | Age: 59
End: 2024-10-31

## 2024-10-31 DIAGNOSIS — F33.9 RECURRENT MAJOR DEPRESSIVE DISORDER, REMISSION STATUS UNSPECIFIED (HCC): ICD-10-CM

## 2024-11-01 RX ORDER — CITALOPRAM HYDROBROMIDE 20 MG/1
20 TABLET ORAL DAILY
Qty: 90 TABLET | Refills: 1 | Status: SHIPPED | OUTPATIENT
Start: 2024-11-01

## 2024-11-05 DIAGNOSIS — E78.2 MIXED HYPERLIPIDEMIA: ICD-10-CM

## 2024-11-05 RX ORDER — LOVASTATIN 20 MG/1
20 TABLET ORAL DAILY
Qty: 90 TABLET | Refills: 1 | Status: SHIPPED | OUTPATIENT
Start: 2024-11-05

## 2024-11-19 ENCOUNTER — OFFICE VISIT (OUTPATIENT)
Facility: CLINIC | Age: 59
End: 2024-11-19

## 2024-11-19 VITALS
HEART RATE: 69 BPM | OXYGEN SATURATION: 99 % | SYSTOLIC BLOOD PRESSURE: 139 MMHG | HEIGHT: 57 IN | RESPIRATION RATE: 18 BRPM | TEMPERATURE: 97.1 F | DIASTOLIC BLOOD PRESSURE: 84 MMHG | WEIGHT: 135.6 LBS | BODY MASS INDEX: 29.26 KG/M2

## 2024-11-19 DIAGNOSIS — Z23 IMMUNIZATION DUE: ICD-10-CM

## 2024-11-19 DIAGNOSIS — K04.7 DENTAL INFECTION: ICD-10-CM

## 2024-11-19 DIAGNOSIS — K21.9 GASTROESOPHAGEAL REFLUX DISEASE WITHOUT ESOPHAGITIS: ICD-10-CM

## 2024-11-19 DIAGNOSIS — E78.2 MIXED HYPERLIPIDEMIA: ICD-10-CM

## 2024-11-19 DIAGNOSIS — E55.9 VITAMIN D DEFICIENCY: ICD-10-CM

## 2024-11-19 DIAGNOSIS — F32.A DEPRESSION, UNSPECIFIED DEPRESSION TYPE: ICD-10-CM

## 2024-11-19 DIAGNOSIS — Z12.11 COLON CANCER SCREENING: ICD-10-CM

## 2024-11-19 DIAGNOSIS — F41.9 ANXIETY: ICD-10-CM

## 2024-11-19 DIAGNOSIS — I10 ESSENTIAL HYPERTENSION: Primary | ICD-10-CM

## 2024-11-19 RX ORDER — HYDROCHLOROTHIAZIDE 25 MG/1
25 TABLET ORAL DAILY
Qty: 90 TABLET | Refills: 1 | Status: SHIPPED | OUTPATIENT
Start: 2024-11-19

## 2024-11-19 RX ORDER — LISINOPRIL 40 MG/1
40 TABLET ORAL DAILY
Qty: 120 TABLET | Refills: 1 | Status: SHIPPED | OUTPATIENT
Start: 2024-11-19

## 2024-11-19 ASSESSMENT — ENCOUNTER SYMPTOMS
APNEA: 0
CHEST TIGHTNESS: 0
ABDOMINAL DISTENTION: 0
ABDOMINAL PAIN: 0

## 2024-11-19 NOTE — PROGRESS NOTES
[facility]    History of Present Illness:   Mary Gonzalez is a 59 y.o. female with history of HTN, GERD, Depression and Anxiety, HLD, Vitamin D    CC: Follow up  History provided by patient and Records    HPI:  Depression: Taking Celexa.    Insomnia: Takes Trazodone PRN.    Dental Pain: Right sided dental pain and swelling in the mouth.  Ongoing for several days, sable over last 1-2 days but has ome bleeding.    Hypertension Follow up:  The patient reports:  taking medications as instructed, no medication side effects noted, no TIA's, no chest pain on exertion, no dyspnea on exertion, no swelling of ankles, no orthostatic dizziness or lightheadedness, no orthopnea or paroxysmal nocturnal dyspnea.     BP Readings from Last 3 Encounters:   11/19/24 139/84   09/09/24 136/78   08/19/24 133/63      Gastroesophageal Reflux:  Current control of Symptoms: Stable  Primary symptoms: heartburn  Hiatal Hernia: No  Current Medications: Prilosec  The patient has no history melena or bright red blood in the stools.  The patient avoids high dose aspirin and NSAID therapy.  The patient is aware of diet changes needed, elevating the head of the bed and appropriate use of antacids.      Hypertriglyceridemia Follow up:   Cardiovascular risks for her are: hypertension  hyperlipidemia.   Current Medications:  lovastatin - 20 MG               Compliance: Yes              Myalgias: No              Fatigue: No              Other side effects: No     Wt Readings from Last 3 Encounters:   11/19/24 61.5 kg (135 lb 9.6 oz)   09/18/24 60.3 kg (133 lb)   09/09/24 60.3 kg (133 lb)       Lab Results   Component Value Date/Time    CHOL 180 08/19/2024 11:05 AM    HDL 82 08/19/2024 11:05 AM    LDL 69.8 08/19/2024 11:05 AM    LDL 83.8 02/02/2024 11:20 AM    VLDL 28.2 08/19/2024 11:05 AM    VLDL 23 09/22/2022 12:00 AM      Lab Results   Component Value Date/Time    ALT 26 08/19/2024 11:05 AM    AST 28 08/19/2024 11:05 AM

## 2024-11-19 NOTE — PROGRESS NOTES
\"Have you been to the ER, urgent care clinic since your last visit?  Hospitalized since your last visit?\"    NO    “Have you seen or consulted any other health care providers outside of [facility] since your last visit?”    NO        “Have you had a colorectal cancer screening such as a colonoscopy/FIT/Cologuard?    NO    No colonoscopy on file  No cologuard on file  Date of last FIT: 7/22/2022   No flexible sigmoidoscopy on file         Click Here for Release of Records Request

## 2024-12-19 ENCOUNTER — TELEPHONE (OUTPATIENT)
Facility: CLINIC | Age: 59
End: 2024-12-19

## 2024-12-19 NOTE — TELEPHONE ENCOUNTER
Pt's Occult Blood Stool Immunoassay will need to be redone. Pt's name and information was not attached to the specimen. Please advise.

## 2024-12-20 NOTE — TELEPHONE ENCOUNTER
Patient called, no answer. Message left for return call.    Stool sample sent to lab did not have name and date of birth so they were unable to process this.    She can come and  another kit at the lab at any time. Be sure to label correctly.

## 2025-01-13 ENCOUNTER — TELEPHONE (OUTPATIENT)
Age: 60
End: 2025-01-13

## 2025-01-13 NOTE — TELEPHONE ENCOUNTER
[facility] auth department [facility].  Scheduled for cMRI 1/27/25   Requesting OV faxed to [phone]    Phone [phone]

## 2025-03-09 DIAGNOSIS — I10 ESSENTIAL HYPERTENSION: ICD-10-CM

## 2025-03-10 RX ORDER — HYDROGEN PEROXIDE 2.65 ML/100ML
81 LIQUID ORAL; TOPICAL DAILY
Qty: 90 TABLET | Refills: 1 | Status: SHIPPED | OUTPATIENT
Start: 2025-03-10

## 2025-03-12 ENCOUNTER — OFFICE VISIT (OUTPATIENT)
Facility: CLINIC | Age: 60
End: 2025-03-12

## 2025-03-12 VITALS
OXYGEN SATURATION: 100 % | RESPIRATION RATE: 16 BRPM | BODY MASS INDEX: 29.34 KG/M2 | TEMPERATURE: 97.3 F | HEART RATE: 75 BPM | DIASTOLIC BLOOD PRESSURE: 79 MMHG | SYSTOLIC BLOOD PRESSURE: 114 MMHG | HEIGHT: 57 IN | WEIGHT: 136 LBS

## 2025-03-12 DIAGNOSIS — K21.9 GASTROESOPHAGEAL REFLUX DISEASE WITHOUT ESOPHAGITIS: ICD-10-CM

## 2025-03-12 DIAGNOSIS — F33.42 RECURRENT MAJOR DEPRESSIVE DISORDER, IN FULL REMISSION: ICD-10-CM

## 2025-03-12 DIAGNOSIS — E53.8 B12 DEFICIENCY: ICD-10-CM

## 2025-03-12 DIAGNOSIS — E55.9 VITAMIN D DEFICIENCY: ICD-10-CM

## 2025-03-12 DIAGNOSIS — Z00.00 WELCOME TO MEDICARE PREVENTIVE VISIT: ICD-10-CM

## 2025-03-12 DIAGNOSIS — F41.9 ANXIETY: ICD-10-CM

## 2025-03-12 DIAGNOSIS — I10 ESSENTIAL HYPERTENSION: ICD-10-CM

## 2025-03-12 DIAGNOSIS — E78.2 MIXED HYPERLIPIDEMIA: Primary | ICD-10-CM

## 2025-03-12 RX ORDER — OMEPRAZOLE 20 MG/1
20 CAPSULE, DELAYED RELEASE ORAL 2 TIMES DAILY
Qty: 180 CAPSULE | Refills: 1 | Status: SHIPPED | OUTPATIENT
Start: 2025-03-12

## 2025-03-12 RX ORDER — METOPROLOL TARTRATE 50 MG
50 TABLET ORAL 2 TIMES DAILY
Qty: 180 TABLET | Refills: 1 | Status: SHIPPED | OUTPATIENT
Start: 2025-03-12

## 2025-03-12 RX ORDER — ERGOCALCIFEROL 1.25 MG/1
CAPSULE, LIQUID FILLED ORAL
Qty: 13 CAPSULE | Refills: 1 | Status: SHIPPED | OUTPATIENT
Start: 2025-03-12

## 2025-03-12 SDOH — ECONOMIC STABILITY: FOOD INSECURITY: WITHIN THE PAST 12 MONTHS, THE FOOD YOU BOUGHT JUST DIDN'T LAST AND YOU DIDN'T HAVE MONEY TO GET MORE.: NEVER TRUE

## 2025-03-12 SDOH — ECONOMIC STABILITY: FOOD INSECURITY: WITHIN THE PAST 12 MONTHS, YOU WORRIED THAT YOUR FOOD WOULD RUN OUT BEFORE YOU GOT MONEY TO BUY MORE.: NEVER TRUE

## 2025-03-12 ASSESSMENT — PATIENT HEALTH QUESTIONNAIRE - PHQ9
10. IF YOU CHECKED OFF ANY PROBLEMS, HOW DIFFICULT HAVE THESE PROBLEMS MADE IT FOR YOU TO DO YOUR WORK, TAKE CARE OF THINGS AT HOME, OR GET ALONG WITH OTHER PEOPLE: NOT DIFFICULT AT ALL
4. FEELING TIRED OR HAVING LITTLE ENERGY: SEVERAL DAYS
6. FEELING BAD ABOUT YOURSELF - OR THAT YOU ARE A FAILURE OR HAVE LET YOURSELF OR YOUR FAMILY DOWN: MORE THAN HALF THE DAYS
3. TROUBLE FALLING OR STAYING ASLEEP: SEVERAL DAYS
9. THOUGHTS THAT YOU WOULD BE BETTER OFF DEAD, OR OF HURTING YOURSELF: NOT AT ALL
SUM OF ALL RESPONSES TO PHQ QUESTIONS 1-9: 6
5. POOR APPETITE OR OVEREATING: NOT AT ALL
SUM OF ALL RESPONSES TO PHQ QUESTIONS 1-9: 6
8. MOVING OR SPEAKING SO SLOWLY THAT OTHER PEOPLE COULD HAVE NOTICED. OR THE OPPOSITE, BEING SO FIGETY OR RESTLESS THAT YOU HAVE BEEN MOVING AROUND A LOT MORE THAN USUAL: NOT AT ALL
SUM OF ALL RESPONSES TO PHQ QUESTIONS 1-9: 6
SUM OF ALL RESPONSES TO PHQ QUESTIONS 1-9: 6
1. LITTLE INTEREST OR PLEASURE IN DOING THINGS: NOT AT ALL
2. FEELING DOWN, DEPRESSED OR HOPELESS: MORE THAN HALF THE DAYS
7. TROUBLE CONCENTRATING ON THINGS, SUCH AS READING THE NEWSPAPER OR WATCHING TELEVISION: NOT AT ALL

## 2025-03-12 ASSESSMENT — VISUAL ACUITY
OD_CC: 20/30
OS_CC: 20/25

## 2025-03-12 ASSESSMENT — LIFESTYLE VARIABLES
HOW MANY STANDARD DRINKS CONTAINING ALCOHOL DO YOU HAVE ON A TYPICAL DAY: 1 OR 2
HOW OFTEN DO YOU HAVE A DRINK CONTAINING ALCOHOL: 2-3 TIMES A WEEK

## 2025-03-12 ASSESSMENT — ENCOUNTER SYMPTOMS
CHEST TIGHTNESS: 0
APNEA: 0
ABDOMINAL DISTENTION: 0

## 2025-03-12 NOTE — PROGRESS NOTES
\"Have you been to the ER, urgent care clinic since your last visit?  Hospitalized since your last visit?\"    no    “Have you seen or consulted any other health care providers outside our system since your last visit?”    no      “Have you had a colorectal cancer screening such as a colonoscopy/FIT/Cologuard?    NO    No colonoscopy on file  No cologuard on file  Date of last FIT: 7/22/2022   No flexible sigmoidoscopy on file             Goals that were addressed and/or need to be completed during or after this appointment include   Health Maintenance Due   Topic Date Due    DTaP/Tdap/Td vaccine (1 - Tdap) Never done    Shingles vaccine (1 of 2) Never done    Pneumococcal 50+ years Vaccine (1 of 1 - PCV) Never done    Colorectal Cancer Screen  07/22/2023    COVID-19 Vaccine (1 - 2024-25 season) Never done    Annual Wellness Visit (Medicare Advantage)  Never done    Depression Monitoring  01/30/2025

## 2025-03-12 NOTE — PROGRESS NOTES
Medicare Annual Wellness Visit    Mary Gonzalez is here for Medicare AWV    Assessment & Plan   Mixed hyperlipidemia  -     Lipid Panel; Future  Vitamin D deficiency  -     vitamin D (ERGOCALCIFEROL) 1.25 MG (22185 UT) CAPS capsule; TAKE 1 CAPSULE BY MOUTH ONCE A WEEK AT 5PM, Disp-13 capsule, R-1Normal  -     Vitamin D 25 Hydroxy; Future  Essential hypertension  -     metoprolol tartrate (LOPRESSOR) 50 MG tablet; Take 1 tablet by mouth 2 times daily, Disp-180 tablet, R-1Normal  -     Comprehensive Metabolic Panel; Future  -     CBC; Future  Gastroesophageal reflux disease without esophagitis  -     omeprazole (PRILOSEC) 20 MG delayed release capsule; Take 1 capsule by mouth in the morning and at bedtime, Disp-180 capsule, R-1Normal  Recurrent major depressive disorder, in full remission  Anxiety  B12 deficiency  -     Vitamin B12; Future  Welcome to Medicare preventive visit       Return in about 6 months (around 9/12/2025).     Subjective     Patient's complete Health Risk Assessment and screening values have been reviewed and are found in Flowsheets. The following problems were reviewed today and where indicated follow up appointments were made and/or referrals ordered.    Positive Risk Factor Screenings with Interventions:              Inactivity:  On average, how many days per week do you engage in moderate to strenuous exercise (like a brisk walk)?: 0 days (!) Abnormal  On average, how many minutes do you engage in exercise at this level?: 0 min  Interventions:  Recommendations: patient agrees to exercise for at least 150 minutes/week      Dentist Screen:  Have you seen the dentist within the past year?: (!) No    Intervention:  Advised to schedule with their dentist     Vision Screen:  Visual Acuity screen is abnormal due to a score of 20/25 or worse.  Do you have difficulty driving, watching TV, or doing any of your daily activities because of your eyesight?: No  Have you had an eye exam within the past

## 2025-03-12 NOTE — PROGRESS NOTES
[facility]    History of Present Illness:   Mary Gonzalez is a 59 y.o. female with history of HTN, GERD, Depression and Anxiety, HLD, Vitamin D   CC: Medicare wellness/Follow up  History provided by patient and Records    HPI:  Hypertension Follow up:  The patient reports:  taking medications as instructed, no medication side effects noted, no TIA's, no chest pain on exertion, no dyspnea on exertion, no swelling of ankles, no orthostatic dizziness or lightheadedness, no orthopnea or paroxysmal nocturnal dyspnea.     BP Readings from Last 3 Encounters:   03/12/25 114/79   11/19/24 139/84   09/09/24 136/78      GERD: Is taking Omeprazole 20 mg BID and overall controlled    Hypertriglyceridemia Follow up:   Cardiovascular risks for her are: hypertension  hyperlipidemia.   Current Medications:  lovastatin - 20 MG    Compliance: Yes   Myalgias: No   Fatigue: No   Other side effects: No     Wt Readings from Last 3 Encounters:   03/12/25 61.7 kg (136 lb)   11/19/24 61.5 kg (135 lb 9.6 oz)   09/18/24 60.3 kg (133 lb)       Lab Results   Component Value Date/Time    CHOL 180 08/19/2024 11:05 AM    HDL 82 08/19/2024 11:05 AM    LDL 69.8 08/19/2024 11:05 AM    LDL 83.8 02/02/2024 11:20 AM    VLDL 28.2 08/19/2024 11:05 AM    VLDL 23 09/22/2022 12:00 AM      Lab Results   Component Value Date/Time    ALT 26 08/19/2024 11:05 AM    AST 28 08/19/2024 11:05 AM       Anxiety/Depression: Controlled on Celexa, is taking trazodone as needed.     Health Maintenance  Health Maintenance Due   Topic Date Due    DTaP/Tdap/Td vaccine (1 - Tdap) Never done    Shingles vaccine (1 of 2) Never done    Pneumococcal 50+ years Vaccine (1 of 1 - PCV) Never done    Colorectal Cancer Screen  07/22/2023    COVID-19 Vaccine (1 - 2024-25 season) Never done    Annual Wellness Visit (Medicare Advantage)  Never done    Depression Monitoring  01/30/2025       Past Medical, Family, and Social History:     Current Outpatient Medications

## 2025-03-13 LAB
25(OH)D3 SERPL-MCNC: 67.3 NG/ML (ref 30–100)
ALBUMIN SERPL-MCNC: 3.9 G/DL (ref 3.5–5)
ALBUMIN/GLOB SERPL: 1.1 (ref 1.1–2.2)
ALP SERPL-CCNC: 94 U/L (ref 45–117)
ALT SERPL-CCNC: 15 U/L (ref 12–78)
ANION GAP SERPL CALC-SCNC: 9 MMOL/L (ref 2–12)
AST SERPL-CCNC: 19 U/L (ref 15–37)
BILIRUB SERPL-MCNC: 0.6 MG/DL (ref 0.2–1)
BUN SERPL-MCNC: 9 MG/DL (ref 6–20)
BUN/CREAT SERPL: 10 (ref 12–20)
CALCIUM SERPL-MCNC: 9.4 MG/DL (ref 8.5–10.1)
CHLORIDE SERPL-SCNC: 98 MMOL/L (ref 97–108)
CHOLEST SERPL-MCNC: 189 MG/DL
CO2 SERPL-SCNC: 28 MMOL/L (ref 21–32)
CREAT SERPL-MCNC: 0.9 MG/DL (ref 0.55–1.02)
GLOBULIN SER CALC-MCNC: 3.6 G/DL (ref 2–4)
GLUCOSE SERPL-MCNC: 88 MG/DL (ref 65–100)
HDLC SERPL-MCNC: 80 MG/DL
HDLC SERPL: 2.4 (ref 0–5)
LDLC SERPL CALC-MCNC: 89 MG/DL (ref 0–100)
POTASSIUM SERPL-SCNC: 3.8 MMOL/L (ref 3.5–5.1)
PROT SERPL-MCNC: 7.5 G/DL (ref 6.4–8.2)
SODIUM SERPL-SCNC: 135 MMOL/L (ref 136–145)
TRIGL SERPL-MCNC: 100 MG/DL
VIT B12 SERPL-MCNC: 446 PG/ML (ref 193–986)
VLDLC SERPL CALC-MCNC: 20 MG/DL

## 2025-03-14 ENCOUNTER — RESULTS FOLLOW-UP (OUTPATIENT)
Facility: CLINIC | Age: 60
End: 2025-03-14

## 2025-05-20 DIAGNOSIS — E78.2 MIXED HYPERLIPIDEMIA: ICD-10-CM

## 2025-05-20 RX ORDER — LOVASTATIN 20 MG/1
20 TABLET ORAL DAILY
Qty: 90 TABLET | Refills: 1 | Status: SHIPPED | OUTPATIENT
Start: 2025-05-20

## 2025-05-28 DIAGNOSIS — F33.9 RECURRENT MAJOR DEPRESSIVE DISORDER, REMISSION STATUS UNSPECIFIED: ICD-10-CM

## 2025-05-28 RX ORDER — CITALOPRAM HYDROBROMIDE 20 MG/1
20 TABLET ORAL DAILY
Qty: 90 TABLET | Refills: 1 | Status: SHIPPED | OUTPATIENT
Start: 2025-05-28

## 2025-08-21 DIAGNOSIS — I10 ESSENTIAL HYPERTENSION: ICD-10-CM

## 2025-08-22 RX ORDER — LISINOPRIL 40 MG/1
40 TABLET ORAL DAILY
Qty: 90 TABLET | Refills: 1 | Status: SHIPPED | OUTPATIENT
Start: 2025-08-22